# Patient Record
Sex: FEMALE | Race: WHITE | NOT HISPANIC OR LATINO | Employment: OTHER | URBAN - METROPOLITAN AREA
[De-identification: names, ages, dates, MRNs, and addresses within clinical notes are randomized per-mention and may not be internally consistent; named-entity substitution may affect disease eponyms.]

---

## 2023-05-17 ENCOUNTER — TELEPHONE (OUTPATIENT)
Dept: PRIMARY CARE | Facility: CLINIC | Age: 68
End: 2023-05-17
Payer: MEDICARE

## 2023-05-17 NOTE — TELEPHONE ENCOUNTER
Patient is asking if Dr. Fernández wants her to do blood work before her visit on May 22.   Her last blood work was in August 22.  That month she did a CBC, Lipid, TSH Cmp and Vitamin B and Vitamin D.   Please advise.

## 2023-05-18 DIAGNOSIS — R73.09 ELEVATED GLUCOSE: ICD-10-CM

## 2023-05-18 DIAGNOSIS — M85.852 OSTEOPENIA OF NECK OF LEFT FEMUR: ICD-10-CM

## 2023-05-18 DIAGNOSIS — Z78.0 MENOPAUSE: ICD-10-CM

## 2023-05-18 DIAGNOSIS — E78.00 HYPERCHOLESTEREMIA: Primary | ICD-10-CM

## 2023-05-18 DIAGNOSIS — Z86.39 HISTORY OF HYPERCALCEMIA: ICD-10-CM

## 2023-05-18 DIAGNOSIS — E53.8 B12 DEFICIENCY: ICD-10-CM

## 2023-05-18 DIAGNOSIS — Z12.31 VISIT FOR SCREENING MAMMOGRAM: ICD-10-CM

## 2023-05-18 NOTE — TELEPHONE ENCOUNTER
Notified the patient and let her know the lab work is fasting and also gave her the  number to call for mammogram and bone density

## 2023-05-19 ENCOUNTER — LAB (OUTPATIENT)
Dept: LAB | Facility: LAB | Age: 68
End: 2023-05-19
Payer: COMMERCIAL

## 2023-05-19 DIAGNOSIS — R73.09 ELEVATED GLUCOSE: ICD-10-CM

## 2023-05-19 DIAGNOSIS — M85.852 OSTEOPENIA OF NECK OF LEFT FEMUR: ICD-10-CM

## 2023-05-19 DIAGNOSIS — E78.00 HYPERCHOLESTEREMIA: ICD-10-CM

## 2023-05-19 DIAGNOSIS — E53.8 B12 DEFICIENCY: ICD-10-CM

## 2023-05-19 LAB
ALANINE AMINOTRANSFERASE (SGPT) (U/L) IN SER/PLAS: 13 U/L (ref 7–45)
ALBUMIN (G/DL) IN SER/PLAS: 4.2 G/DL (ref 3.4–5)
ALKALINE PHOSPHATASE (U/L) IN SER/PLAS: 66 U/L (ref 33–136)
ANION GAP IN SER/PLAS: 12 MMOL/L (ref 10–20)
ASPARTATE AMINOTRANSFERASE (SGOT) (U/L) IN SER/PLAS: 16 U/L (ref 9–39)
BASOPHILS (10*3/UL) IN BLOOD BY AUTOMATED COUNT: 0.02 X10E9/L (ref 0–0.1)
BASOPHILS/100 LEUKOCYTES IN BLOOD BY AUTOMATED COUNT: 0.4 % (ref 0–2)
BILIRUBIN TOTAL (MG/DL) IN SER/PLAS: 0.7 MG/DL (ref 0–1.2)
CALCIDIOL (25 OH VITAMIN D3) (NG/ML) IN SER/PLAS: 31 NG/ML
CALCIUM (MG/DL) IN SER/PLAS: 9.8 MG/DL (ref 8.6–10.6)
CARBON DIOXIDE, TOTAL (MMOL/L) IN SER/PLAS: 30 MMOL/L (ref 21–32)
CHLORIDE (MMOL/L) IN SER/PLAS: 105 MMOL/L (ref 98–107)
CHOLESTEROL (MG/DL) IN SER/PLAS: 189 MG/DL (ref 0–199)
CHOLESTEROL IN HDL (MG/DL) IN SER/PLAS: 60 MG/DL
CHOLESTEROL/HDL RATIO: 3.2
COBALAMIN (VITAMIN B12) (PG/ML) IN SER/PLAS: 403 PG/ML (ref 211–911)
CREATININE (MG/DL) IN SER/PLAS: 0.81 MG/DL (ref 0.5–1.05)
EOSINOPHILS (10*3/UL) IN BLOOD BY AUTOMATED COUNT: 0.09 X10E9/L (ref 0–0.7)
EOSINOPHILS/100 LEUKOCYTES IN BLOOD BY AUTOMATED COUNT: 2 % (ref 0–6)
ERYTHROCYTE DISTRIBUTION WIDTH (RATIO) BY AUTOMATED COUNT: 12.8 % (ref 11.5–14.5)
ERYTHROCYTE MEAN CORPUSCULAR HEMOGLOBIN CONCENTRATION (G/DL) BY AUTOMATED: 33.5 G/DL (ref 32–36)
ERYTHROCYTE MEAN CORPUSCULAR VOLUME (FL) BY AUTOMATED COUNT: 95 FL (ref 80–100)
ERYTHROCYTES (10*6/UL) IN BLOOD BY AUTOMATED COUNT: 4.54 X10E12/L (ref 4–5.2)
ESTIMATED AVERAGE GLUCOSE FOR HBA1C: 123 MG/DL
GFR FEMALE: 79 ML/MIN/1.73M2
GLUCOSE (MG/DL) IN SER/PLAS: 90 MG/DL (ref 74–99)
HEMATOCRIT (%) IN BLOOD BY AUTOMATED COUNT: 43 % (ref 36–46)
HEMOGLOBIN (G/DL) IN BLOOD: 14.4 G/DL (ref 12–16)
HEMOGLOBIN A1C/HEMOGLOBIN TOTAL IN BLOOD: 5.9 %
IMMATURE GRANULOCYTES/100 LEUKOCYTES IN BLOOD BY AUTOMATED COUNT: 0.2 % (ref 0–0.9)
LDL: 115 MG/DL (ref 0–99)
LEUKOCYTES (10*3/UL) IN BLOOD BY AUTOMATED COUNT: 4.6 X10E9/L (ref 4.4–11.3)
LYMPHOCYTES (10*3/UL) IN BLOOD BY AUTOMATED COUNT: 2.31 X10E9/L (ref 1.2–4.8)
LYMPHOCYTES/100 LEUKOCYTES IN BLOOD BY AUTOMATED COUNT: 50.2 % (ref 13–44)
MONOCYTES (10*3/UL) IN BLOOD BY AUTOMATED COUNT: 0.46 X10E9/L (ref 0.1–1)
MONOCYTES/100 LEUKOCYTES IN BLOOD BY AUTOMATED COUNT: 10 % (ref 2–10)
NEUTROPHILS (10*3/UL) IN BLOOD BY AUTOMATED COUNT: 1.71 X10E9/L (ref 1.2–7.7)
NEUTROPHILS/100 LEUKOCYTES IN BLOOD BY AUTOMATED COUNT: 37.2 % (ref 40–80)
NRBC (PER 100 WBCS) BY AUTOMATED COUNT: 0 /100 WBC (ref 0–0)
PLATELETS (10*3/UL) IN BLOOD AUTOMATED COUNT: 258 X10E9/L (ref 150–450)
POTASSIUM (MMOL/L) IN SER/PLAS: 5 MMOL/L (ref 3.5–5.3)
PROTEIN TOTAL: 6.8 G/DL (ref 6.4–8.2)
SODIUM (MMOL/L) IN SER/PLAS: 142 MMOL/L (ref 136–145)
THYROTROPIN (MIU/L) IN SER/PLAS BY DETECTION LIMIT <= 0.05 MIU/L: 1.74 MIU/L (ref 0.44–3.98)
TRIGLYCERIDE (MG/DL) IN SER/PLAS: 71 MG/DL (ref 0–149)
UREA NITROGEN (MG/DL) IN SER/PLAS: 23 MG/DL (ref 6–23)
VLDL: 14 MG/DL (ref 0–40)

## 2023-05-19 PROCEDURE — 85025 COMPLETE CBC W/AUTO DIFF WBC: CPT

## 2023-05-19 PROCEDURE — 82607 VITAMIN B-12: CPT

## 2023-05-19 PROCEDURE — 80053 COMPREHEN METABOLIC PANEL: CPT

## 2023-05-19 PROCEDURE — 36415 COLL VENOUS BLD VENIPUNCTURE: CPT

## 2023-05-19 PROCEDURE — 84443 ASSAY THYROID STIM HORMONE: CPT

## 2023-05-19 PROCEDURE — 80061 LIPID PANEL: CPT

## 2023-05-19 PROCEDURE — 82306 VITAMIN D 25 HYDROXY: CPT

## 2023-05-19 PROCEDURE — 83036 HEMOGLOBIN GLYCOSYLATED A1C: CPT

## 2023-05-21 NOTE — PROGRESS NOTES
"Subjective   Patient ID: Wendy Savage is a 68 y.o. female who presents for Nausea, Vomiting (Bouts of nausea, vomiting and diarrhea.  \"It starts with sneezing\"/Episodes 4-5 times/week./jwrn), and Diarrhea.  LAST VISIT PLAN 7/26/2022  **Patient Discussion/Summary  Risk Factors Identified During Visit: None.   Influenza: influenza vaccine was recommended.   Pneumovax 23/Prevnar 15: Pneumovax 23/Prevnar 15 vaccine was previously given.   Shingles Vaccine: Shingles vaccine was previously given.   Breast cancer screening: screening ordered and please obtain your mammogram.   Cervical cancer screening: screening is current and your pap and hpv test were normal in 2020. no pap is due at this time.   Osteoporosis screening: screening is current.   Colorectal cancer screening: screening is current and due in 2026, normal in the past and last was 2016.   HIV screening: screening not indicated.   Thank you for coming in for your annual.   MEDICATIONS:  INSTRUCTIONS:  A diet that is low in sugars, refined grains and processed foods is recommended.  Exercise at least 30 minutes per day is also recommended.  Please bring copies of your LIVING WILL AND DURABLE POWER OF  FOR HEALTH CARE to the office.  For information and access to Advanced Directives forms like your Living Will and Durable Healthcare Power-of-, go to:   [email or web address],   then click on News and Publications,   then click [facility] Publications,   then click Advanced Directives.   Or, you can type into your address bar: [email or web address]  At the bottom of the page are informative links as well as â€˜[place] Advanced Directive Forms' which contains all of the printable forms we discussed.  TESTING:please get the fasting blood tests done.and mammogram  REFERRALS:call dr kayla strange office to reschedule your january 2023 appt. as computer lists it as cancelled.  FOLLOW UP WITH DR. HIGGINS:  Next " "visit: one year annual sooner if needed  Provider Impressions  Annual history and physical completed including ROS, PE.   Medicare wellness visit completed including:  Immunizations,   Health Maintenance items,  Discussion of advanced directives and code status, which is: full code  Fall risk and prevention addressed.  Functionality and pain were assessed.   Cancer screening testing was addressed as appropriate-see patient discussion/orders.   Medications were discussed and reviewed/reconciled and refill need assessed/handled.   Patient states taking their medication regularly and appropriately.  Also:   Depression screening PhQ-2 completed: neg  Alcohol misuse screen:see ros form  In addition to the wellness visit and screening, the following medical issues were addressed:  Results of any testing completed since last visit, and  GERD asymp after 3 months of ppi and with lifestyle changes.  Vitamin B12, D deficiency-labs ordered  Vaginal atrophy as per dr whipple and urethral caruncle followed there as well  Osteopenia dexa current is exercising and taking calcium  END INFO FROM PRIOR VISIT    HPI  Sneezing and then nausea vomiting first episode,  and then diarrhea.when got home.  That was after bk chick sandwich and not sure if she ate trail mix on the way home.  2 other times trail mix eating and nausea, diarrhea then vomiting.  Last time was after pf vasques and had a wrap that had nuts.  Lasts 15 minutes then done.  First time worse. And even woke her up in the night.'she still has her gall bladder.  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    No cp sob palpitations.she is keeping busy in her \"group home\" and is exercising and no issues.  Scribe Transcription:  She states she was recently driving home from [place] and had to pull over because she was nauseous. 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 She states it happened again in late March. She " states it is always a while after she eats which starts with sneezing, then diarrhea, and then vomiting. She reports eating trail mix while heading back from [place]. Her trail mix contains cashews, almonds, chocolate.    She states her bp has been low sometimes which causes dizziness and lightheadedness. She an echo in 2021 which showed normal LV systolic function. She does have mild impaired diastolic filling.     She is due for a colonoscopy in 2026. She has normal bowel movements other than her food allergy episodes. She also reports drinking kombucha and probiotics.     Her CAC score was 0 in 2019. She had a stress test in August 2021 and a resting EKG with low voltage QRS and non-specific changes.     Scribe Attestation  By signing my name below, I, Anderson Potter, [place]   attest that this documentation has been prepared under the direction and in the presence of Maxine Fernández MD.   Review of Systems  See hpi  Objective   Lab Results   Component Value Date    WBC 4.6 05/19/2023    HGB 14.4 05/19/2023    HCT 43.0 05/19/2023     05/19/2023    CHOL 189 05/19/2023    TRIG 71 05/19/2023    HDL 60.0 05/19/2023    ALT 13 05/19/2023    AST 16 05/19/2023     05/19/2023    K 5.0 05/19/2023     05/19/2023    CREATININE 0.81 05/19/2023    BUN 23 05/19/2023    CO2 30 05/19/2023    TSH 1.74 05/19/2023    HGBA1C 5.9 (A) 05/19/2023     2018 ct cac score zero  Stress test 2021:  Baseline ECG: Resting ECG showed normal sinus rhythm with low voltage QRS and nonspecific ST-T wave changes.     Stress ECG: Stress ECG showed sinus tachycardia, with one isolated PAC. No ST changes.  Summary:   1. Negative stress EKG for ischemia.   2. Excellent functional capacity for age.   3. No exercise associated cardiac symptoms were noted.   4. Appropriate blood pressure response with exercise.    Todays ecg is same. As 2019.     Latest Reference Range & Units Most Recent   GLUCOSE 74 - 99 mg/dL 90  5/19/23 08:22   SODIUM 136  - 145 mmol/L 142  5/19/23 08:22   POTASSIUM 3.5 - 5.3 mmol/L 5.0  5/19/23 08:22   CHLORIDE 98 - 107 mmol/L 105  5/19/23 08:22   Bicarbonate 21 - 32 mmol/L 30  5/19/23 08:22   Anion Gap 10 - 20 mmol/L 12  5/19/23 08:22   Blood Urea Nitrogen 6 - 23 mg/dL 23  5/19/23 08:22   Creatinine 0.50 - 1.05 mg/dL 0.81  5/19/23 08:22   Calcium 8.6 - 10.6 mg/dL 9.8  5/19/23 08:22   Albumin 3.4 - 5.0 g/dL 4.2  5/19/23 08:22   Alkaline Phosphatase 33 - 136 U/L 66  5/19/23 08:22   ALT 7 - 45 U/L 13  5/19/23 08:22   AST 9 - 39 U/L 16  5/19/23 08:22   Bilirubin Total 0.0 - 1.2 mg/dL 0.7  5/19/23 08:22   Bilirubin, Direct 0.0 - 0.3 mg/dL 0.1  10/3/17 11:19   HDL CHOLESTEROL mg/dL 60.0  5/19/23 08:22   Cholesterol/HDL Ratio  3.2  5/19/23 08:22   VLDL 0 - 40 mg/dL 14  5/19/23 08:22   TRIGLYCERIDES 0 - 149 mg/dL 71  5/19/23 08:22   Total Protein 6.4 - 8.2 g/dL 6.8  5/19/23 08:22   MAGNESIUM 1.60 - 2.40 mg/dL 2.18  11/21/19 08:29   CHOLESTEROL 0 - 199 mg/dL 189  5/19/23 08:22   LDL 0 - 99 mg/dL 115 (H)  5/19/23 08:22   Vitamin B12 211 - 911 pg/mL 403  5/19/23 08:22   Hemoglobin A1C % 5.9 !  5/19/23 08:22   Thyroxine, Free 0.78 - 1.48 ng/dL 1.16  11/21/19 08:29   Thyroid Stimulating Hormone 0.44 - 3.98 mIU/L 1.74  5/19/23 08:22   Vitamin D, 25-Hydroxy, Total ng/mL 31  5/19/23 08:22   Estimated Average Glucose MG/  5/19/23 08:22   WBC 4.4 - 11.3 x10E9/L 4.6  5/19/23 08:22   RBC 4.00 - 5.20 x10E12/L 4.54  5/19/23 08:22   HEMOGLOBIN 12.0 - 16.0 g/dL 14.4  5/19/23 08:22   HEMATOCRIT 36.0 - 46.0 % 43.0  5/19/23 08:22   MCV 80 - 100 fL 95  5/19/23 08:22   MCHC 32.0 - 36.0 g/dL 33.5  5/19/23 08:22   Platelets 150 - 450 x10E9/L 258  5/19/23 08:22   Neutrophils % 40.0 - 80.0 % 37.2  5/19/23 08:22   Immature Granulocytes %, Automated 0.0 - 0.9 % 0.2  5/19/23 08:22   Lymphocytes % 13.0 - 44.0 % 50.2  5/19/23 08:22   Monocytes % 2.0 - 10.0 % 10.0  5/19/23 08:22   Eosinophils % 0.0 - 6.0 % 2.0  5/19/23 08:22   Basophils % 0.0 - 2.0 % 0.4  5/19/23  08:22   Neutrophils Absolute 1.20 - 7.70 x10E9/L 1.71  5/19/23 08:22   Lymphocytes Absolute 1.20 - 4.80 x10E9/L 2.31  5/19/23 08:22   Monocytes Absolute 0.10 - 1.00 x10E9/L 0.46  5/19/23 08:22   Eosinophils Absolute 0.00 - 0.70 x10E9/L 0.09  5/19/23 08:22   Basophils Absolute 0.00 - 0.10 x10E9/L 0.02  5/19/23 08:22   nRBC 0.0 - 0.0 /100 WBC 0.0  5/19/23 08:22   RED CELL DISTRIBUTION WIDTH 11.5 - 14.5 % 12.8  5/19/23 08:22   URINE CULTURE  Rpt  7/22/21 02:46   Sars-Cov-2 Nucleocapsid IgG Antibody Negative  Negative  8/4/22 08:19   Color, Urine STRAW,YELLOW  YELLOW  7/21/21 17:16   Appearance, Urine CLEAR  CLEAR  7/21/21 17:16   Specific Gravity, Urine 1.005 - 1.035  1.011  7/21/21 17:16   pH, Urine 5.0 - 8.0  7.0  7/21/21 17:16   Protein, Urine NEGATIVE mg/dL NEGATIVE  7/21/21 17:16   Glucose, Urine NEGATIVE mg/dL NEGATIVE  7/21/21 17:16   Blood, Urine NEGATIVE  NEGATIVE  7/21/21 17:16   Ketones, Urine NEGATIVE mg/dL NEGATIVE  7/21/21 17:16   Bilirubin, Urine NEGATIVE  NEGATIVE  7/21/21 17:16   Urobilinogen, Urine 0.0 - 1.9 mg/dL <2.0  7/21/21 17:16   Nitrite, Urine NEGATIVE  NEGATIVE  7/21/21 17:16   Leukocyte Esterase, Urine NEGATIVE  NEGATIVE  7/21/21 17:16   CT CARDIAC SCORING WO IV CONTRAST  Rpt  6/20/19 09:38   XR CERVICAL SPINE COMPLETE 4-5 VIEWS  Rpt  4/26/17 09:19   XR LUMBAR SPINE 2-3 VIEWS  Rpt  7/9/20 12:46   BI MAMMO BILATERAL SCREENING  Rpt  11/7/12 17:32   US ABDOMEN LIMITED LIVER  Rpt  6/24/20 09:10   DEXA BONE DENSITY  Rpt  5/18/23 11:03   BI MAMMO BILATERAL SCREENING TOMOSYNTHESIS  Rpt  5/18/23 11:03   BONE DENSITY  Rpt  8/4/21 09:00   CARDIAC STRESS TEST  Rpt  8/18/21 00:00   DIGITAL MAMM SCREENING  Rpt  7/9/21 10:00   ECHOCARDIOGRAM  Rpt  7/29/21 00:00   ELECTROCARDIOGRAM 12 LEAD  Rpt  7/29/21 07:49   ELECTROCARDIOGRAM RHYTHM STRIP  Rpt  11/18/19 00:00   FINGERS & HAND  Rpt  4/26/17 09:20   GFR Female >90 mL/min/1.73m2 79  5/19/23 08:22   GLOMERULAR FILTRATION RATE- >60  "mL/min/1.73m2 >60  7/9/20 12:27   GLOMERULAR FILTRATION RATE-NON  >60 mL/min/1.73m2 >60  7/9/20 12:27   HIP W PELVIS  Rpt  7/9/20 12:46   P Axis degrees 50  7/29/21 07:49   VA Interval ms 126  7/29/21 07:49   QT Interval ms 438  7/29/21 07:49   SARS-COV-2 PCR, SCREEN ASYMPTOMATIC  Rpt  8/4/20 09:33   SARS-CoV-2 Result Not Detected  NOT DETECTED  8/4/20 09:33   XR KNEE COMPLETE 4 OR MORE VIEWS  Rpt  3/31/14 11:35   (H): Data is abnormally high  !: Data is abnormal  Rpt: View report in Results Review for more information    Physical ExamBP 100/70   Pulse 69   Resp 20   Ht 1.562 m (5' 1.5\")   Wt 62.1 kg (136 lb 12.8 oz)   BMI 25.43 kg/m²       10/12/2021    11:51 AM 1/20/2022     9:05 AM 4/7/2022     4:34 PM 7/26/2022    10:17 AM 10/18/2022    11:27 AM 3/9/2023     8:40 AM 5/22/2023     3:03 PM   Vitals   Systolic 103 125  126 118 112 100   Diastolic 64 78  69 69 70 70   Heart Rate 60 71 82 55 67 62 69   Temp   37.4 °C (99.3 °F)  36.7 °C (98.1 °F)     Resp  18   14  20   Height (in)    1.549 m (5' 1\")  1.549 m (5' 1\") 1.562 m (5' 1.5\")   Weight (lb) 144.9 143 142 138.19 138 142 136.8   BMI 26.94 kg/m2 26.58 kg/m2 26.4 kg/m2 26.11 kg/m2 26.07 kg/m2 26.83 kg/m2 25.43 kg/m2   BSA (m2) 1.69 m2 1.68 m2 1.67 m2 1.64 m2 1.64 m2 1.66 m2 1.64 m2   Visit Report       Report       Home bp 90/60  Working out   She is getting dizzy when working out  Had nausea at [employer]  Patient looks well and is in no obvious distress.  HEENT:   Normocephalic, no facial asymmetry  Eyes: Sclera and conjunctiva are clear.  Neck: No adenopathy cervical (Ant/post/lat), no Supraclavicular nodes.   Thyroid normal.   Carotid pulses normal, no carotid bruits.  Lungs : RR normal. Clear to auscultation anterior, posterior and lateral. No rales, wheezes rhonchi or rubs. Good air exchange.  Heart: RRR. No Murmur, gallop, click or rub.  Abdomen: Bowel sounds normal, No bruits. No pulsatile mass. No " hepatosplenomegaly, masses or tenderness. Soft, no guarding.  Vascular:  Posterior tibialis and dorsalis pedis pulses within normal limits bilaterally.   Extremities: no upper extremity edema. No lower extremity edema.   Musculoskeletal: no synovitis of joints seen. No new deformity.  Neuro: CN 2-12 intact. Alert, appropriate.   Ambulates independently.  No gross motor deficit.   No tremors.  Psych: normal mood and affect.  Skin: No rash, bruising petechiae or jaundice.    Assessment/Plan   Problem List Items Addressed This Visit    None  Visit Diagnoses       Elevated hemoglobin A1c    -  Primary    Nausea and vomiting in adult        Relevant Orders    Cashew Nut Component RAna o 3    Macadamia nut IgE    Peanut IgE    [place] IgE    US gallbladder (Completed)    Acute diarrhea        Relevant Orders    Cashew Nut Component RAna o 3    Macadamia nut IgE    Peanut IgE    [place] IgE    Allergy, unspecified, initial encounter        Relevant Orders    Peanut IgE    [place] IgE    IgE    Other specified hypotension        Relevant Orders    Cortisol    ACTH    Transthoracic echo (TTE) complete    ECG 12 lead    Aortic valve insufficiency, acquired        Relevant Orders    Transthoracic echo (TTE) complete          Will ck for biliary origin, gb us, consider hida scan if that is normal.  Will ck for allergies as well.  For a1, schedule echo.  Doubt realted to addisons, but will ck acth and cortisol. Not able to schedule cortrosyn stim test. She is not losing weight.

## 2023-05-22 ENCOUNTER — OFFICE VISIT (OUTPATIENT)
Dept: PRIMARY CARE | Facility: CLINIC | Age: 68
End: 2023-05-22
Payer: MEDICARE

## 2023-05-22 VITALS
SYSTOLIC BLOOD PRESSURE: 100 MMHG | WEIGHT: 136.8 LBS | HEIGHT: 62 IN | HEART RATE: 69 BPM | RESPIRATION RATE: 20 BRPM | BODY MASS INDEX: 25.17 KG/M2 | DIASTOLIC BLOOD PRESSURE: 70 MMHG

## 2023-05-22 DIAGNOSIS — R11.2 NAUSEA AND VOMITING IN ADULT: ICD-10-CM

## 2023-05-22 DIAGNOSIS — I35.1 AORTIC VALVE INSUFFICIENCY, ACQUIRED: ICD-10-CM

## 2023-05-22 DIAGNOSIS — I95.89 OTHER SPECIFIED HYPOTENSION: ICD-10-CM

## 2023-05-22 DIAGNOSIS — R19.7 ACUTE DIARRHEA: ICD-10-CM

## 2023-05-22 DIAGNOSIS — T78.40XA ALLERGY, UNSPECIFIED, INITIAL ENCOUNTER: ICD-10-CM

## 2023-05-22 DIAGNOSIS — R73.09 ELEVATED HEMOGLOBIN A1C: Primary | ICD-10-CM

## 2023-05-22 PROCEDURE — 99215 OFFICE O/P EST HI 40 MIN: CPT | Performed by: INTERNAL MEDICINE

## 2023-05-22 PROCEDURE — 93000 ELECTROCARDIOGRAM COMPLETE: CPT | Performed by: INTERNAL MEDICINE

## 2023-05-22 PROCEDURE — 1159F MED LIST DOCD IN RCRD: CPT | Performed by: INTERNAL MEDICINE

## 2023-05-22 PROCEDURE — 1036F TOBACCO NON-USER: CPT | Performed by: INTERNAL MEDICINE

## 2023-05-22 RX ORDER — OMEPRAZOLE 20 MG/1
CAPSULE, DELAYED RELEASE ORAL
COMMUNITY
End: 2024-01-23 | Stop reason: SDUPTHER

## 2023-05-22 RX ORDER — CHOLECALCIFEROL (VITAMIN D3) 125 MCG
CAPSULE ORAL
COMMUNITY
Start: 2017-04-19

## 2023-05-22 RX ORDER — ESTRADIOL 0.1 MG/G
CREAM VAGINAL
COMMUNITY
Start: 2020-03-26

## 2023-05-22 RX ORDER — IBUPROFEN 600 MG/1
TABLET ORAL
COMMUNITY
Start: 2017-10-03 | End: 2024-01-23 | Stop reason: SDUPTHER

## 2023-05-22 RX ORDER — GLUCOSAM/CHONDRO/HERB 149/HYAL 750-100 MG
TABLET ORAL
COMMUNITY

## 2023-05-22 RX ORDER — CALCIPOTRIENE 50 UG/G
CREAM TOPICAL 2 TIMES DAILY
COMMUNITY
Start: 2017-06-28 | End: 2024-05-03 | Stop reason: WASHOUT

## 2023-05-22 RX ORDER — LANOLIN ALCOHOL/MO/W.PET/CERES
1 CREAM (GRAM) TOPICAL DAILY
COMMUNITY

## 2023-05-22 ASSESSMENT — LIFESTYLE VARIABLES
SKIP TO QUESTIONS 9-10: 1
HOW OFTEN DURING THE LAST YEAR HAVE YOU FOUND THAT YOU WERE NOT ABLE TO STOP DRINKING ONCE YOU HAD STARTED: NEVER
HOW OFTEN DO YOU HAVE A DRINK CONTAINING ALCOHOL: 2-3 TIMES A WEEK
AUDIT-C TOTAL SCORE: 3
HAS A RELATIVE, FRIEND, DOCTOR, OR ANOTHER HEALTH PROFESSIONAL EXPRESSED CONCERN ABOUT YOUR DRINKING OR SUGGESTED YOU CUT DOWN: NO
HOW OFTEN DO YOU HAVE SIX OR MORE DRINKS ON ONE OCCASION: NEVER
HOW OFTEN DURING THE LAST YEAR HAVE YOU NEEDED AN ALCOHOLIC DRINK FIRST THING IN THE MORNING TO GET YOURSELF GOING AFTER A NIGHT OF HEAVY DRINKING: NEVER
HOW OFTEN DURING THE LAST YEAR HAVE YOU HAD A FEELING OF GUILT OR REMORSE AFTER DRINKING: NEVER
HOW OFTEN DURING THE LAST YEAR HAVE YOU BEEN UNABLE TO REMEMBER WHAT HAPPENED THE NIGHT BEFORE BECAUSE YOU HAD BEEN DRINKING: NEVER
HAVE YOU OR SOMEONE ELSE BEEN INJURED AS A RESULT OF YOUR DRINKING: NO
HOW OFTEN DURING THE LAST YEAR HAVE YOU FAILED TO DO WHAT WAS NORMALLY EXPECTED FROM YOU BECAUSE OF DRINKING: NEVER
HOW MANY STANDARD DRINKS CONTAINING ALCOHOL DO YOU HAVE ON A TYPICAL DAY: 1 OR 2
AUDIT TOTAL SCORE: 3

## 2023-05-22 ASSESSMENT — PATIENT HEALTH QUESTIONNAIRE - PHQ9
1. LITTLE INTEREST OR PLEASURE IN DOING THINGS: NOT AT ALL
2. FEELING DOWN, DEPRESSED OR HOPELESS: NOT AT ALL
SUM OF ALL RESPONSES TO PHQ9 QUESTIONS 1 AND 2: 0

## 2023-05-22 ASSESSMENT — PAIN SCALES - GENERAL: PAINLEVEL: 0-NO PAIN

## 2023-05-22 ASSESSMENT — COLUMBIA-SUICIDE SEVERITY RATING SCALE - C-SSRS
2. HAVE YOU ACTUALLY HAD ANY THOUGHTS OF KILLING YOURSELF?: NO
1. IN THE PAST MONTH, HAVE YOU WISHED YOU WERE DEAD OR WISHED YOU COULD GO TO SLEEP AND NOT WAKE UP?: NO
6. HAVE YOU EVER DONE ANYTHING, STARTED TO DO ANYTHING, OR PREPARED TO DO ANYTHING TO END YOUR LIFE?: NO

## 2023-05-22 ASSESSMENT — ENCOUNTER SYMPTOMS
LOSS OF SENSATION IN FEET: 0
OCCASIONAL FEELINGS OF UNSTEADINESS: 0
DEPRESSION: 0

## 2023-05-22 NOTE — PATIENT INSTRUCTIONS
Obtain a non fasting cortisol level at 8 am. Can do allergen blood tests then as well.    Schedule ultrasound of gallbladder.    Schedule echocardiogram to check on aortic valve and lower blood pressure.  Do not restrict salt from your diet, your blood pressure can be a little higher.    Follow up for your annual wellness -keep appt.

## 2023-05-27 DIAGNOSIS — R11.2 NAUSEA AND VOMITING IN ADULT: Primary | ICD-10-CM

## 2023-05-27 DIAGNOSIS — K82.4 GALLBLADDER POLYP: ICD-10-CM

## 2023-05-27 DIAGNOSIS — K76.89 SIMPLE HEPATIC CYST: ICD-10-CM

## 2023-05-30 ENCOUNTER — LAB (OUTPATIENT)
Dept: LAB | Facility: LAB | Age: 68
End: 2023-05-30
Payer: MEDICARE

## 2023-05-30 ENCOUNTER — TELEPHONE (OUTPATIENT)
Dept: PRIMARY CARE | Facility: CLINIC | Age: 68
End: 2023-05-30

## 2023-05-30 DIAGNOSIS — T78.40XA ALLERGY, UNSPECIFIED, INITIAL ENCOUNTER: ICD-10-CM

## 2023-05-30 DIAGNOSIS — I95.89 OTHER SPECIFIED HYPOTENSION: ICD-10-CM

## 2023-05-30 DIAGNOSIS — R19.7 ACUTE DIARRHEA: ICD-10-CM

## 2023-05-30 DIAGNOSIS — R11.2 NAUSEA AND VOMITING IN ADULT: ICD-10-CM

## 2023-05-30 LAB
CORTISOL (UG/DL) IN SERUM: 19.9 UG/DL (ref 2.5–20)
IGE (IU/L) IN SERUM OR PLASMA: 13 IU/ML (ref 0–214)

## 2023-05-30 PROCEDURE — 82024 ASSAY OF ACTH: CPT

## 2023-05-30 PROCEDURE — 82785 ASSAY OF IGE: CPT

## 2023-05-30 PROCEDURE — 36415 COLL VENOUS BLD VENIPUNCTURE: CPT

## 2023-05-30 PROCEDURE — 82533 TOTAL CORTISOL: CPT

## 2023-05-30 PROCEDURE — 86003 ALLG SPEC IGE CRUDE XTRC EA: CPT

## 2023-05-30 PROCEDURE — 86008 ALLG SPEC IGE RECOMB EA: CPT

## 2023-05-30 NOTE — TELEPHONE ENCOUNTER
Patient notified and read message.  BN    ----- Message from Maxine Fernández MD sent at 5/27/2023  5:38 PM EDT -----  Please call the patient regarding her result.  Gallbladder ultrasound did not show gallstones. There is a tiny 2mm polyp that the radiologist said did not need any follow up  There are a couple of liver cysts, small, not worrisome.  Recommend she get a  gallbladder function test, nuclear test -schedule at radiology but no t our building.

## 2023-06-01 ENCOUNTER — TELEPHONE (OUTPATIENT)
Dept: PRIMARY CARE | Facility: CLINIC | Age: 68
End: 2023-06-01
Payer: MEDICARE

## 2023-06-01 NOTE — TELEPHONE ENCOUNTER
----- Message from Maxine Fernández MD sent at 6/1/2023  9:03 AM EDT -----  Please call the patient regarding her result.  IgE level and cortisol level are normal. Hida scan test results from 5-31 are not yet resulted.    Pt notified. LMOM

## 2023-06-01 NOTE — RESULT ENCOUNTER NOTE
Please call the patient regarding her result.  IgE level and cortisol level are normal. Hida scan test results from 5-31 are not yet resulted.

## 2023-06-02 LAB
ADRENOCORTICOTROPIC HORMONE: 19.3 PG/ML (ref 7.2–63.3)
ALLERGEN FOOD: ALMOND (AMYGDALUS COMMUNIS) IGE (KU/L): <0.1 KU/L
ALLERGEN FOOD: MACADAMIA NUT (MACADAMIA SPP.) IGE (KU/L): <0.1 KU/L
ALLERGEN FOOD: PEANUT (ARACHIS HYPOGAEA) IGE (KU/L): <0.1 KU/L
CASHEW COMP. RA O3, VIRC: <0.1 KU/L
CLASS CASHEW RA O3 , VIRC: 0
IMMUNOCAP INTERPRETATION (ARUP): NORMAL

## 2023-07-21 NOTE — PROGRESS NOTES
Subjective   Reason for Visit: Wendy Savage is an 68 y.o. female here for a Medicare Wellness visit. And annual physical exam, review of tets.  LAST VISIT: 5/22/2023  PATIENT'S DISCUSSION/SUMMARY:  Obtain a non fasting cortisol level at 8 am. Can do allergen blood tests then as well.  Schedule ultrasound of gallbladder.  Schedule echocardiogram to check on aortic valve and lower blood pressure.  Do not restrict salt from your diet, your blood pressure can be a little higher.  Follow up for your annual wellness -keep appt.  PROVIDER'S IMPRESSIONS:  Elevated hemoglobin A1c  Nausea and vomiting in adult  Acute diarrhea  Allergy, unspecified, initial encounter  Other specified hypotension  Aortic valve insufficiency, acquired  Orders Placed  ACTH  [place] IgE  Cashew Nut Component RAna o 3  Cortisol  IgE  Macadamia nut IgE  Peanut IgE   all allergens checked were negative  US gallbladder--negative  Transthoracic echo (TTE) complete  stable echo  ECG 12 lead  Encounters with Related Results  Lab (5/30/2023)  END OF LAST VISIT        HPI  Here for w visit and annual pe and lab review.  Tests done since last visit normal or stable. Echo mild ai, gb assessment neg including hida scan.  A1c 5.9 may,not needed now. Cortisol levels ok.  Allergen foods tested neg.      Health maintenance items last completed:  Colonoscopy: 12/29/2016 NORMAL COLONIC MUCOSA. SMALL INTERNAL HEMORRHOIDS. CONTINUE METAMUCIL DAILY.   Mammogram: 8/04/2022 Category: 1 - Negative. Recommendation: 1 Year Screening.   Bone Density: 08/04/2021 Osteopenia   Pap: 2020 r pap and hpv test were normal in 2020. no pap is due at this time. In 2023   Pelvic:dr Campbell June 2023  Breast exam in office:today  Vaccines due or not completed: utd reviewed.  Last Health Maintenance exam: 7/06/2022    Patient Care Team:  Maxine Fernández MD as PCP - General  Maxine Fernández MD as PCP - [employer] PCP     [facility] updated.  Review of Systems  All systems  reviewed and are negative unless otherwise stated in HPI or noted in writing on the written   7 page history and review of systems document reviewed by me and  scanned in with this visit.    All negative other than trigger finger middle finger right and arthritis.  No further diarrhea n/v episodes.  Objective    Latest Reference Range & Units 05/30/23 08:25   CORTISOL 2.5 - 20.0 ug/dL 19.9   Adrenocorticotropic Hormone (ACTH) 7.2 - 63.3 pg/mL 19.3   Rena L[place] IgE <0.35 KU/L <0.10   Cashew Comp. rA o3 <0.10 kU/L <0.10   Class Cashew rA o3  0   Macadamia Nut IgE <=0.34 kU/L <0.10   Peanut IgE <0.35 KU/L <0.10   Immunocap Interpretation  See Note   IgE 0 - 214 IU/mL 13      Latest Reference Range & Units 05/19/23 08:22   GLUCOSE 74 - 99 mg/dL 90   SODIUM 136 - 145 mmol/L 142   POTASSIUM 3.5 - 5.3 mmol/L 5.0   CHLORIDE 98 - 107 mmol/L 105   Bicarbonate 21 - 32 mmol/L 30   Anion Gap 10 - 20 mmol/L 12   Blood Urea Nitrogen 6 - 23 mg/dL 23   Creatinine 0.50 - 1.05 mg/dL 0.81   Calcium 8.6 - 10.6 mg/dL 9.8   Albumin 3.4 - 5.0 g/dL 4.2   Alkaline Phosphatase 33 - 136 U/L 66   ALT 7 - 45 U/L 13   AST 9 - 39 U/L 16   Bilirubin Total 0.0 - 1.2 mg/dL 0.7   HDL CHOLESTEROL mg/dL 60.0   Cholesterol/HDL Ratio  3.2   VLDL 0 - 40 mg/dL 14   TRIGLYCERIDES 0 - 149 mg/dL 71   Total Protein 6.4 - 8.2 g/dL 6.8   CHOLESTEROL 0 - 199 mg/dL 189   LDL 0 - 99 mg/dL 115 (H)   Vitamin B12 211 - 911 pg/mL 403   Hemoglobin A1C % 5.9 !   Thyroid Stimulating Hormone 0.44 - 3.98 mIU/L 1.74   Vitamin D, 25-Hydroxy, Total ng/mL 31   Estimated Average Glucose MG/   WBC 4.4 - 11.3 x10E9/L 4.6   RBC 4.00 - 5.20 x10E12/L 4.54   HEMOGLOBIN 12.0 - 16.0 g/dL 14.4   HEMATOCRIT 36.0 - 46.0 % 43.0   MCV 80 - 100 fL 95   MCHC 32.0 - 36.0 g/dL 33.5   Platelets 150 - 450 x10E9/L 258   Neutrophils % 40.0 - 80.0 % 37.2   Immature Granulocytes %, Automated 0.0 - 0.9 % 0.2   Lymphocytes % 13.0 - 44.0 % 50.2   Monocytes % 2.0 - 10.0 % 10.0   Eosinophils % 0.0 - 6.0 %  "2.0   Basophils % 0.0 - 2.0 % 0.4   Neutrophils Absolute 1.20 - 7.70 x10E9/L 1.71   Lymphocytes Absolute 1.20 - 4.80 x10E9/L 2.31   Monocytes Absolute 0.10 - 1.00 x10E9/L 0.46   Eosinophils Absolute 0.00 - 0.70 x10E9/L 0.09   Basophils Absolute 0.00 - 0.10 x10E9/L 0.02   nRBC 0.0 - 0.0 /100 WBC 0.0   RED CELL DISTRIBUTION WIDTH 11.5 - 14.5 % 12.8   GFR Female >90 mL/min/1.73m2 79     PGM diabetes on insulin   === 05/22/23 ===    US GALLBLADDER    - Impression -  The right hepatic lobe is enlarged; no acute pathology is otherwise  demonstrated. Has liver cysts.    Echo June 2023 mild AI otherwise ok.  RSVP normal    NM gb scan normal  Vitals:  BP 97/72   Pulse 66   Resp 18   Ht 1.562 m (5' 1.5\")   Wt 63.5 kg (140 lb)   BMI 26.02 kg/m²       Physical Exam  General: Patient is known to me, looks well, is in usual state of health, with  no obvious distress.  Head: normocephalic  Eyes: PERRL, EOMI, no scleral icterus or conjunctival abnormality  Ears:Normal canals and TM's  Oropharynx: Well hydrated mucosa. no lesions, erythema. palate moves well.  Neck: No masses, no cervical (A/P/ or Lateral) adenopathy. Thyroid not enlarged and no nodules. Carotid pulses normal and no bruits.  Chest: Normal AP diameter. No supraclavicular adenopathy.  Lungs: Clear to auscultation: no rales , wheezes, rhonchi, rubs, examined bilaterally, ant/post /lateral. RR normal.  Heart: RRR. No MGCR S1 S2 normal.  Abd: BS normal, no bruits. No hepatosplenomegaly. No abdominal mass or tenderness. No distension.  Extremities: No upper extremity edema. No lower leg edema.No ischemia.   Joints: no synovitis seen. No deformities.  Pulses: normal posterior tibialis pulses, normal dorsalis pedis pulses. normal radial pulses. Normal femoral pulses without bruits.  Neuro: CN 2-12 intact . Alert, oriented, appropriate.  No focal weakness observed. No tremors. Ambulation is normal .  Psych: Mood and affect normal. No cognitive deficits noted during this " exam. Alert, oriented, appropriate.  Skin: No rash, petechiae or excessive bruising.  Lymph: no SC axillary or inguinal adenopathy   Breast Exam : Symmetric appearance. No masses, nipple discharge, skin retraction, axillary or supraclavicular adenopathy.    Dupuytren's contracture right 3/4 finger right.    Pelvic exam done by urogyn dr whipple June 2023, pap is not due.    Assessment/Plan   Problem List Items Addressed This Visit    None  Visit Diagnoses       Encounter for subsequent annual wellness visit (AWV) in Medicare patient    -  Primary    Relevant Orders    Visual acuity screening    Annual visit for general adult medical examination with abnormal findings        Aortic valve insufficiency, acquired        Elevated hemoglobin A1c        Relevant Orders    Hemoglobin A1c    Allergy, unspecified, subsequent encounter        Osteopenia of neck of left femur        Relevant Orders    XR DEXA bone density    Menopause        Relevant Orders    XR DEXA bone density    Trigger finger, right middle finger        Need for hepatitis C screening test        Relevant Orders    Hepatitis C antibody         Annual  history and physical completed including  ROS, PE.   Medicare wellness visit  completed including:  Immunizations,   Health Maintenance items,  Discussion of advanced directives and code status, which is: full code  Fall risk and prevention addressed.  Functionality and pain were assessed.   Cancer screening testing was addressed as appropriate-see patient discussion/orders.   Medications were discussed and reviewed/reconciled and refill need assessed/handled.   Patient states taking their medication regularly and appropriately.  Also:   Depression screening PhQ-2 completed: neg  Alcohol misuse screen: neg    In addition to the wellness visit and screening, the above medical issues were addressed:  As well as results of testing completed since last visit.   Pt instructions:  Thank you for coming in for your  wellness visit.    Follow up in one year--call ahead to have lab orders placed prior to that visit.  Blood test in November to check A1c and hepatitis c screening.    If you have another episode that might be a food allergy will allergy refer.  Dermatologist for psoriasis as planned.    See your hand surgeon for trigger finger if it keeps bothering you. Try massage every night with a cream that has vitamin E in it.    Please do your health care power of .    Mammogram and bone density as planned.

## 2023-07-26 ENCOUNTER — APPOINTMENT (OUTPATIENT)
Dept: PRIMARY CARE | Facility: CLINIC | Age: 68
End: 2023-07-26
Payer: MEDICARE

## 2023-07-31 ENCOUNTER — OFFICE VISIT (OUTPATIENT)
Dept: PRIMARY CARE | Facility: CLINIC | Age: 68
End: 2023-07-31
Payer: MEDICARE

## 2023-07-31 VITALS
HEART RATE: 66 BPM | DIASTOLIC BLOOD PRESSURE: 72 MMHG | SYSTOLIC BLOOD PRESSURE: 97 MMHG | BODY MASS INDEX: 25.76 KG/M2 | WEIGHT: 140 LBS | RESPIRATION RATE: 18 BRPM | HEIGHT: 62 IN

## 2023-07-31 DIAGNOSIS — I35.1 AORTIC VALVE INSUFFICIENCY, ACQUIRED: ICD-10-CM

## 2023-07-31 DIAGNOSIS — Z00.01 ANNUAL VISIT FOR GENERAL ADULT MEDICAL EXAMINATION WITH ABNORMAL FINDINGS: ICD-10-CM

## 2023-07-31 DIAGNOSIS — Z00.00 ENCOUNTER FOR SUBSEQUENT ANNUAL WELLNESS VISIT (AWV) IN MEDICARE PATIENT: Primary | ICD-10-CM

## 2023-07-31 DIAGNOSIS — M85.852 OSTEOPENIA OF NECK OF LEFT FEMUR: ICD-10-CM

## 2023-07-31 DIAGNOSIS — R73.09 ELEVATED HEMOGLOBIN A1C: ICD-10-CM

## 2023-07-31 DIAGNOSIS — Z11.59 NEED FOR HEPATITIS C SCREENING TEST: ICD-10-CM

## 2023-07-31 DIAGNOSIS — T78.40XD ALLERGY, UNSPECIFIED, SUBSEQUENT ENCOUNTER: ICD-10-CM

## 2023-07-31 DIAGNOSIS — Z13.89 ENCOUNTER FOR SCREENING FOR OTHER DISORDER: ICD-10-CM

## 2023-07-31 DIAGNOSIS — M65.331 TRIGGER FINGER, RIGHT MIDDLE FINGER: ICD-10-CM

## 2023-07-31 DIAGNOSIS — Z78.9 FULL CODE STATUS: ICD-10-CM

## 2023-07-31 DIAGNOSIS — Z78.0 MENOPAUSE: ICD-10-CM

## 2023-07-31 PROBLEM — E78.00 ELEVATED LDL CHOLESTEROL LEVEL: Status: ACTIVE | Noted: 2023-07-31

## 2023-07-31 PROBLEM — D69.6 THROMBOCYTOPENIA (CMS-HCC): Status: RESOLVED | Noted: 2023-07-31 | Resolved: 2023-07-31

## 2023-07-31 PROBLEM — M62.838 SPASM OF CERVICAL PARASPINOUS MUSCLE: Status: RESOLVED | Noted: 2023-07-31 | Resolved: 2023-07-31

## 2023-07-31 PROBLEM — M25.532 PAIN IN BOTH WRISTS: Status: RESOLVED | Noted: 2023-07-31 | Resolved: 2023-07-31

## 2023-07-31 PROBLEM — J01.00 ACUTE MAXILLARY SINUSITIS: Status: RESOLVED | Noted: 2023-07-31 | Resolved: 2023-07-31

## 2023-07-31 PROBLEM — R05.8 PRODUCTIVE COUGH: Status: RESOLVED | Noted: 2023-07-31 | Resolved: 2023-07-31

## 2023-07-31 PROBLEM — G56.03 CARPAL TUNNEL SYNDROME, BILATERAL UPPER LIMBS: Status: RESOLVED | Noted: 2023-07-31 | Resolved: 2023-07-31

## 2023-07-31 PROBLEM — R00.2 PALPITATIONS: Status: RESOLVED | Noted: 2023-07-31 | Resolved: 2023-07-31

## 2023-07-31 PROBLEM — K44.9 HIATAL HERNIA: Status: ACTIVE | Noted: 2023-07-31

## 2023-07-31 PROBLEM — R20.2 RIGHT HAND PARESTHESIA: Status: RESOLVED | Noted: 2023-07-31 | Resolved: 2023-07-31

## 2023-07-31 PROBLEM — R51.9 HEADACHE: Status: RESOLVED | Noted: 2023-07-31 | Resolved: 2023-07-31

## 2023-07-31 PROBLEM — N95.2 POSTMENOPAUSAL ATROPHIC VAGINITIS: Status: ACTIVE | Noted: 2023-07-31

## 2023-07-31 PROBLEM — E55.9 VITAMIN D DEFICIENCY: Status: ACTIVE | Noted: 2023-07-31

## 2023-07-31 PROBLEM — R79.89 LOW VITAMIN B12 LEVEL: Status: ACTIVE | Noted: 2023-07-31

## 2023-07-31 PROBLEM — N36.2 URETHRAL CARUNCLE: Status: ACTIVE | Noted: 2023-07-31

## 2023-07-31 PROBLEM — M19.049 ARTHRITIS OF HAND: Status: ACTIVE | Noted: 2023-07-31

## 2023-07-31 PROBLEM — K21.9 GERD (GASTROESOPHAGEAL REFLUX DISEASE): Status: ACTIVE | Noted: 2023-07-31

## 2023-07-31 PROBLEM — R12 HEARTBURN: Status: RESOLVED | Noted: 2023-07-31 | Resolved: 2023-07-31

## 2023-07-31 PROBLEM — R22.31 MASS OF RIGHT FOREARM: Status: RESOLVED | Noted: 2023-07-31 | Resolved: 2023-07-31

## 2023-07-31 PROBLEM — R31.9 HEMATURIA: Status: RESOLVED | Noted: 2023-07-31 | Resolved: 2023-07-31

## 2023-07-31 PROBLEM — R07.89 ATYPICAL CHEST PAIN: Status: RESOLVED | Noted: 2023-07-31 | Resolved: 2023-07-31

## 2023-07-31 PROBLEM — K76.89 HEPATIC CYST: Status: ACTIVE | Noted: 2023-07-31

## 2023-07-31 PROBLEM — M25.531 PAIN IN BOTH WRISTS: Status: RESOLVED | Noted: 2023-07-31 | Resolved: 2023-07-31

## 2023-07-31 PROBLEM — M62.838 TRAPEZIUS MUSCLE SPASM: Status: RESOLVED | Noted: 2023-07-31 | Resolved: 2023-07-31

## 2023-07-31 PROBLEM — L40.9 PSORIASIS: Status: ACTIVE | Noted: 2023-07-31

## 2023-07-31 PROBLEM — R20.0 NUMBNESS OF FINGER: Status: RESOLVED | Noted: 2023-07-31 | Resolved: 2023-07-31

## 2023-07-31 PROBLEM — R42 LIGHTHEADEDNESS: Status: RESOLVED | Noted: 2023-07-31 | Resolved: 2023-07-31

## 2023-07-31 PROBLEM — M47.812 DEGENERATIVE JOINT DISEASE OF CERVICAL SPINE: Status: RESOLVED | Noted: 2023-07-31 | Resolved: 2023-07-31

## 2023-07-31 PROCEDURE — 1126F AMNT PAIN NOTED NONE PRSNT: CPT | Performed by: INTERNAL MEDICINE

## 2023-07-31 PROCEDURE — 1159F MED LIST DOCD IN RCRD: CPT | Performed by: INTERNAL MEDICINE

## 2023-07-31 PROCEDURE — 99397 PER PM REEVAL EST PAT 65+ YR: CPT | Performed by: INTERNAL MEDICINE

## 2023-07-31 PROCEDURE — 1170F FXNL STATUS ASSESSED: CPT | Performed by: INTERNAL MEDICINE

## 2023-07-31 PROCEDURE — 1036F TOBACCO NON-USER: CPT | Performed by: INTERNAL MEDICINE

## 2023-07-31 PROCEDURE — G0444 DEPRESSION SCREEN ANNUAL: HCPCS | Performed by: INTERNAL MEDICINE

## 2023-07-31 PROCEDURE — 1160F RVW MEDS BY RX/DR IN RCRD: CPT | Performed by: INTERNAL MEDICINE

## 2023-07-31 PROCEDURE — G0439 PPPS, SUBSEQ VISIT: HCPCS | Performed by: INTERNAL MEDICINE

## 2023-07-31 PROCEDURE — 1123F ACP DISCUSS/DSCN MKR DOCD: CPT | Performed by: INTERNAL MEDICINE

## 2023-07-31 RX ORDER — VITAMIN B COMPLEX
1 CAPSULE ORAL
COMMUNITY

## 2023-07-31 RX ORDER — GUAIFENESIN AND PHENYLEPHRINE HCL 400; 10 MG/1; MG/1
1 TABLET ORAL DAILY
COMMUNITY
Start: 2021-07-21

## 2023-07-31 SDOH — ECONOMIC STABILITY: FOOD INSECURITY: WITHIN THE PAST 12 MONTHS, THE FOOD YOU BOUGHT JUST DIDN'T LAST AND YOU DIDN'T HAVE MONEY TO GET MORE.: NEVER TRUE

## 2023-07-31 SDOH — ECONOMIC STABILITY: INCOME INSECURITY: IN THE LAST 12 MONTHS, WAS THERE A TIME WHEN YOU WERE NOT ABLE TO PAY THE MORTGAGE OR RENT ON TIME?: NO

## 2023-07-31 SDOH — ECONOMIC STABILITY: TRANSPORTATION INSECURITY
IN THE PAST 12 MONTHS, HAS THE LACK OF TRANSPORTATION KEPT YOU FROM MEDICAL APPOINTMENTS OR FROM GETTING MEDICATIONS?: NO

## 2023-07-31 SDOH — ECONOMIC STABILITY: HOUSING INSECURITY
IN THE LAST 12 MONTHS, WAS THERE A TIME WHEN YOU DID NOT HAVE A STEADY PLACE TO SLEEP OR SLEPT IN A SHELTER (INCLUDING NOW)?: NO

## 2023-07-31 SDOH — ECONOMIC STABILITY: HOUSING INSECURITY: IN THE LAST 12 MONTHS, HOW MANY PLACES HAVE YOU LIVED?: 1

## 2023-07-31 SDOH — ECONOMIC STABILITY: TRANSPORTATION INSECURITY
IN THE PAST 12 MONTHS, HAS LACK OF TRANSPORTATION KEPT YOU FROM MEETINGS, WORK, OR FROM GETTING THINGS NEEDED FOR DAILY LIVING?: NO

## 2023-07-31 SDOH — ECONOMIC STABILITY: FOOD INSECURITY: WITHIN THE PAST 12 MONTHS, YOU WORRIED THAT YOUR FOOD WOULD RUN OUT BEFORE YOU GOT MONEY TO BUY MORE.: NEVER TRUE

## 2023-07-31 SDOH — HEALTH STABILITY: PHYSICAL HEALTH: ON AVERAGE, HOW MANY DAYS PER WEEK DO YOU ENGAGE IN MODERATE TO STRENUOUS EXERCISE (LIKE A BRISK WALK)?: 6 DAYS

## 2023-07-31 SDOH — HEALTH STABILITY: PHYSICAL HEALTH: ON AVERAGE, HOW MANY MINUTES DO YOU ENGAGE IN EXERCISE AT THIS LEVEL?: 60 MIN

## 2023-07-31 ASSESSMENT — ACTIVITIES OF DAILY LIVING (ADL)
ASSISTIVE_DEVICE: EYEGLASSES
PATIENT'S MEMORY ADEQUATE TO SAFELY COMPLETE DAILY ACTIVITIES?: YES
TOILETING: INDEPENDENT
HEARING - LEFT EAR: FUNCTIONAL
DRESSING YOURSELF: INDEPENDENT
ADEQUATE_TO_COMPLETE_ADL: YES
JUDGMENT_ADEQUATE_SAFELY_COMPLETE_DAILY_ACTIVITIES: YES
BATHING: INDEPENDENT
FEEDING YOURSELF: INDEPENDENT
GROOMING: INDEPENDENT
WALKS IN HOME: INDEPENDENT
HEARING - RIGHT EAR: FUNCTIONAL

## 2023-07-31 ASSESSMENT — SOCIAL DETERMINANTS OF HEALTH (SDOH)
HOW OFTEN DO YOU ATTEND CHURCH OR RELIGIOUS SERVICES?: 1 TO 4 TIMES PER YEAR
DO YOU BELONG TO ANY CLUBS OR ORGANIZATIONS SUCH AS CHURCH GROUPS UNIONS, FRATERNAL OR ATHLETIC GROUPS, OR SCHOOL GROUPS?: YES
HOW OFTEN DO YOU GET TOGETHER WITH FRIENDS OR RELATIVES?: TWICE A WEEK
HOW HARD IS IT FOR YOU TO PAY FOR THE VERY BASICS LIKE FOOD, HOUSING, MEDICAL CARE, AND HEATING?: NOT HARD AT ALL
WITHIN THE LAST YEAR, HAVE YOU BEEN HUMILIATED OR EMOTIONALLY ABUSED IN OTHER WAYS BY YOUR PARTNER OR EX-PARTNER?: NO
WITHIN THE LAST YEAR, HAVE YOU BEEN AFRAID OF YOUR PARTNER OR EX-PARTNER?: NO
IN A TYPICAL WEEK, HOW MANY TIMES DO YOU TALK ON THE PHONE WITH FAMILY, FRIENDS, OR NEIGHBORS?: MORE THAN THREE TIMES A WEEK
WITHIN THE LAST YEAR, HAVE YOU BEEN KICKED, HIT, SLAPPED, OR OTHERWISE PHYSICALLY HURT BY YOUR PARTNER OR EX-PARTNER?: NO
IN THE PAST 12 MONTHS, HAS THE ELECTRIC, GAS, OIL, OR WATER COMPANY THREATENED TO SHUT OFF SERVICE IN YOUR HOME?: NO
WITHIN THE LAST YEAR, HAVE TO BEEN RAPED OR FORCED TO HAVE ANY KIND OF SEXUAL ACTIVITY BY YOUR PARTNER OR EX-PARTNER?: NO
HOW OFTEN DO YOU ATTENT MEETINGS OF THE CLUB OR ORGANIZATION YOU BELONG TO?: 1 TO 4 TIMES PER YEAR

## 2023-07-31 ASSESSMENT — ENCOUNTER SYMPTOMS
OCCASIONAL FEELINGS OF UNSTEADINESS: 0
DEPRESSION: 0
LOSS OF SENSATION IN FEET: 0

## 2023-07-31 ASSESSMENT — PAIN SCALES - GENERAL: PAINLEVEL: 0-NO PAIN

## 2023-07-31 NOTE — PATIENT INSTRUCTIONS
Thank you for coming in for your wellness visit.    Follow up in one year--call ahead to have lab orders placed prior to that visit.  Blood test in November to check A1c and hepatitis c screening.    If you have another episode that might be a food allergy will allergy refer.  Dermatologist for psoriasis as planned.    See your hand surgeon for trigger finger if it keeps bothering you. Try massage every night with a cream that has vitamin E in it.    Please do your health care power of .    Mammogram and bone density as planned.

## 2023-08-12 NOTE — RESULT ENCOUNTER NOTE
Please call the patient regarding her result.   TThe bone density showed very mild  bone thinning,called osteopenia, but not osteoporosis.   Recommend:   1.Keep up with calcium intake (diet and or supplement, 1200 mg daily total).   2.Keep up with Vitamin D (either dose we have discussed or 800-1000iu daily).  3.Weight bearing exercise. The National Osteoporosis Website has instructions for exercise to strengthen bones.  4.Bone DEXA should be repeated in 2 years.    Also her mammogram was negative. Repeat the mammogram annually.

## 2023-08-13 ASSESSMENT — PATIENT HEALTH QUESTIONNAIRE - PHQ9
1. LITTLE INTEREST OR PLEASURE IN DOING THINGS: NOT AT ALL
SUM OF ALL RESPONSES TO PHQ9 QUESTIONS 1 AND 2: 0
2. FEELING DOWN, DEPRESSED OR HOPELESS: NOT AT ALL

## 2023-08-14 ENCOUNTER — TELEPHONE (OUTPATIENT)
Dept: PRIMARY CARE | Facility: CLINIC | Age: 68
End: 2023-08-14
Payer: MEDICARE

## 2023-08-14 NOTE — TELEPHONE ENCOUNTER
----- Message from Maxine Fernández MD sent at 8/12/2023  6:12 PM EDT -----  Please call the patient regarding her result.   TThe bone density showed very mild  bone thinning,called osteopenia, but not osteoporosis.   Recommend:   1.Keep up with calcium intake (diet and or supplement, 1200 mg daily total).   2.Keep up with Vitamin D (either dose we have discussed or 800-1000iu daily).  3.Weight bearing exercise. The National Osteoporosis Website has instructions for exercise to strengthen bones.  4.Bone DEXA should be repeated in 2 years.    Also her mammogram was negative. Repeat the mammogram annually.

## 2023-12-01 ENCOUNTER — LAB (OUTPATIENT)
Dept: LAB | Facility: LAB | Age: 68
End: 2023-12-01
Payer: MEDICARE

## 2023-12-01 DIAGNOSIS — R73.09 ELEVATED HEMOGLOBIN A1C: ICD-10-CM

## 2023-12-01 DIAGNOSIS — Z11.59 NEED FOR HEPATITIS C SCREENING TEST: ICD-10-CM

## 2023-12-01 LAB
EST. AVERAGE GLUCOSE BLD GHB EST-MCNC: 123 MG/DL
HBA1C MFR BLD: 5.9 %
HCV AB SER QL: NONREACTIVE

## 2023-12-01 PROCEDURE — 86803 HEPATITIS C AB TEST: CPT

## 2023-12-01 PROCEDURE — 36415 COLL VENOUS BLD VENIPUNCTURE: CPT

## 2023-12-01 PROCEDURE — 83036 HEMOGLOBIN GLYCOSYLATED A1C: CPT

## 2023-12-04 NOTE — RESULT ENCOUNTER NOTE
Wendy, your hemoglobin A1c is similar to the last one and minimally elevated.  The hepatitis c screening test is negative which is good.Continue to follow a lower sugar/lower glycemic index diet.

## 2023-12-20 ENCOUNTER — LAB (OUTPATIENT)
Dept: LAB | Facility: LAB | Age: 68
End: 2023-12-20
Payer: MEDICARE

## 2023-12-20 DIAGNOSIS — L40.0 PSORIASIS VULGARIS: Primary | ICD-10-CM

## 2023-12-20 PROCEDURE — 86481 TB AG RESPONSE T-CELL SUSP: CPT

## 2023-12-20 PROCEDURE — 36415 COLL VENOUS BLD VENIPUNCTURE: CPT

## 2023-12-22 LAB
NIL(NEG) CONTROL SPOT COUNT: NORMAL
PANEL A SPOT COUNT: 0
PANEL B SPOT COUNT: 0
POS CONTROL SPOT COUNT: NORMAL
T-SPOT. TB INTERPRETATION: NEGATIVE

## 2024-01-23 DIAGNOSIS — K21.9 GASTROESOPHAGEAL REFLUX DISEASE, UNSPECIFIED WHETHER ESOPHAGITIS PRESENT: ICD-10-CM

## 2024-01-23 DIAGNOSIS — M19.049 ARTHRITIS OF HAND: ICD-10-CM

## 2024-01-23 RX ORDER — OMEPRAZOLE 20 MG/1
CAPSULE, DELAYED RELEASE ORAL
Qty: 90 CAPSULE | Refills: 0 | Status: SHIPPED | OUTPATIENT
Start: 2024-01-23

## 2024-01-23 RX ORDER — IBUPROFEN 600 MG/1
600 TABLET ORAL EVERY 8 HOURS PRN
Qty: 90 TABLET | Refills: 1 | Status: SHIPPED | OUTPATIENT
Start: 2024-01-23

## 2024-01-23 NOTE — TELEPHONE ENCOUNTER
Please refill.    omeprazole (PriLOSEC) 20 mg DR capsule   TAKE 1 CAPSULE Daily 30 minutes before a meal if taking ibuprofen     ibuprofen 600 mg tablet   Take by mouth   [facility]

## 2024-02-19 ENCOUNTER — TELEPHONE (OUTPATIENT)
Dept: PRIMARY CARE | Facility: CLINIC | Age: 69
End: 2024-02-19

## 2024-02-19 NOTE — TELEPHONE ENCOUNTER
"Pt called and stated she believes she has a \" Inguinal or Femoral Hernia\". She noticed she has a big bubble located in her vagina area about a month ago. It is hard for her to use the bathroom because it sticks out and she is able to push it back in. When she sits down she is in a lot of pain. Pt had a carbuncle in the past and it was lanced off.   Pt # [phone]  "

## 2024-02-21 NOTE — TELEPHONE ENCOUNTER
I called her back. She sees Dr Campbell for her caruncle. She c/o urethral outpouching that comes and goes. She was instructed that she likely has another/recurring caruncle, or possibly urethral prolapse or other prolapse. I advised her that she needs a pelvic exam with Dr Campbell and encouraged her to keep her apt with her on 3/6/24. She was also advised to go to the ER if she has retention or bleeding or fever. She verbalized instructions.

## 2024-03-09 NOTE — PROGRESS NOTES
Urogynecology  Provider:  Zeynep Campbell MD  [phone]              ASSESSMENT AND PLAN:     Problem List Items Addressed This Visit    None          I spent a total of eConsult Time: 30 minutes in face to face and non face to face time.        Zeynep Campbell MD    HISTORY OF PRESENT ILLNESS    Wendy Lawler a 68 y.o. here today for a follow up     Prolapse symptoms:  - Concerned she has prolapse. This is new for her and quite bothersome.  Started with ski season last year but much worse this year.  - Reports she is bothered by the bulge    - She is sexually active   - Most bothered by the bulge during alexander season and working out     Urinary symptoms:  -  Using TV estrogen cream and this helps her caruncle     Past Medical History:   Diagnosis Date    Abnormal electrocardiogram (ECG) (EKG)     Abnormal electrocardiogram    Acute maxillary sinusitis 07/31/2023    Anxiety disorder, unspecified     Anxiety    Atypical chest pain 07/31/2023    Body mass index (BMI) 25.0-25.9, adult 11/18/2019    BMI 25.0-25.9,adult    Closed fracture of pelvis (CMS/HCC) 02/29/2016    Effusion, unspecified knee 03/10/2014    Effusion of knee    Encounter for general adult medical examination without abnormal findings 07/21/2021    Welcome to Medicare preventive visit    Encounter for immunization 04/29/2019    Need for Td vaccine    Encounter for screening for malignant neoplasm of colon 04/07/2017    Screen for colon cancer    Encounter for screening for osteoporosis 04/28/2017    Screening for osteoporosis    Headache 07/31/2023    Heartburn 07/31/2023    Hematuria 07/31/2023    Impaired fasting glucose 04/07/2022    Fasting hyperglycemia    Lightheadedness 07/31/2023    Low back pain, unspecified 07/21/2021    Left low back pain    Mass of right forearm 07/31/2023    Nausea with vomiting, unspecified 10/03/2017    Nausea, vomiting, and diarrhea    Other chest pain 04/30/2019    Atypical chest pain    Other  intra-abdominal and pelvic swelling, mass and lump 2020    Bulge in groin area    Other microscopic hematuria 2016    Microhematuria    Pain in right knee 03/10/2014    Right knee pain    Pain in unspecified knee 2014    Joint pain, knee    Palpitations 2023    Personal history of (healed) traumatic fracture 2020    History of fracture of pelvis    Personal history of other diseases of the digestive system 2020    History of bilateral inguinal hernias    Personal history of other diseases of the female genital tract     History of dysfunctional uterine bleeding    Personal history of other diseases of the musculoskeletal system and connective tissue     History of rotator cuff tear    Personal history of other diseases of the musculoskeletal system and connective tissue     History of fibromyositis    Personal history of other diseases of the respiratory system 2022    History of upper respiratory infection    Personal history of other diseases of the respiratory system 2022    History of laryngitis    Personal history of other endocrine, nutritional and metabolic disease     History of hypercalcemia    Personal history of other specified conditions 2022    History of fever    Productive cough 2023    Right hand paresthesia 2023    Right lower quadrant pain 2020    Bilateral groin pain    Trapezius muscle spasm 2023    Vitamin D deficiency, unspecified     Vitamin D deficiency          Past Surgical History:       Past Surgical History:   Procedure Laterality Date     SECTION, CLASSIC  2013     Section    COLONOSCOPY  2013    Colonoscopy (Fiberoptic)    DILATION AND CURETTAGE OF UTERUS  2013    Dilation And Curettage    OTHER SURGICAL HISTORY  2013    Cervix Cryosurgery    OTHER SURGICAL HISTORY  2013    Hysteroscopy    OTHER SURGICAL HISTORY  10/12/2021    Ulnar collateral ligament of elbow  reconstruction    OTHER SURGICAL HISTORY  10/12/2021    Wrist arthroscopy    OTHER SURGICAL HISTORY  10/12/2021    Ulnar collateral ligament of thumb rupture repair    OTHER SURGICAL HISTORY  10/12/2021    Carpal tunnel surgery    OTHER SURGICAL HISTORY  10/12/2021    Synovectomy    OTHER SURGICAL HISTORY  10/12/2021    Arthroplasty    OTHER SURGICAL HISTORY  10/12/2021    Metacarpophalangeal joint arthroplasty    SINUS SURGERY  03/01/2013    Sinus Surgery         Medications:       Prior to Admission medications    Medication Sig Start Date End Date Taking? Authorizing Provider   b complex vitamins capsule Take 1 capsule by mouth once daily.    Historical Provider, MD   calcipotriene (Dovonex) 0.005 % cream twice a day. 6/28/17   Historical Provider, MD   CALCIUM 26-VIT D3-MAGNESIUM 15 ORAL Take 1 capsule by mouth once daily. 7/21/21   Historical Provider, MD   cholecalciferol (Vitamin D-3) 125 MCG (5000 UT) capsule Take by mouth. 4/19/17   Historical Provider, MD   cyanocobalamin (Vitamin B-12) 1,000 mcg tablet Take 1 tablet (1,000 mcg) by mouth once daily.    Historical Provider, MD   estradiol (Estrace) 0.1 MG/GM vaginal cream Insert into the vagina. 3/26/20   Historical Provider, MD   ibuprofen 600 mg tablet Take 1 tablet (600 mg) by mouth every 8 hours if needed for moderate pain (4 - 6). 1/23/24   Maxine Fernández MD   omega 3-dha-epa-fish oil 1,000 mg (120 mg-180 mg) capsule Take by mouth.    Historical Provider, MD   omeprazole (PriLOSEC) 20 mg DR capsule TAKE 1 CAPSULE Daily 30 minutes before a meal if taking ibuprofen 1/23/24   Maxine Fernández MD   turmeric root extract 500 mg capsule Take 1 capsule by mouth once daily. 7/21/21   Historical Provider, MD   ZINC PICOLINATE, BULK, MISC Take 50 mg by mouth.    Historical Provider, MD        ROS  Review of Systems     Blood, Urine   Date Value Ref Range Status   07/21/2021 NEGATIVE NEGATIVE Final     Nitrite, Urine   Date Value Ref Range Status   07/21/2021  "NEGATIVE NEGATIVE Final     Urobilinogen, Urine   Date Value Ref Range Status   07/21/2021 <2.0 0.0 - 1.9 mg/dL Final         PHYSICAL EXAM:      There were no vitals taken for this visit.     No LMP recorded.      Declines chaperone for physical exam.      Well developed, well nourished, in no apparent distress.   Neurologic/Psychiatric:  Awake, Alert and Oriented times 3.  Affect normal.     GENITAL/URINARY:       External Genitalia:  The patient has normal appearing external genitalia, normal skenes and bartholins glands, and a normal hair distribution.  Her vulva is without lesions, erythema or discharge.  It is non-tender with appropriate sensation.     Urethral Meatus:  Size normal, Location normal, Lesions absent, Prolapse absent,      Urethra:  Fullness absent, Masses absent,  minimal caruncle     Bladder:  Fullness absent, Masses absent, Tenderness absent,      Vagina:  General appearance normal, Discharge absent, Lesions absent, moderately atrophic     Cervix: Normal, no discharge.   Uterus:  normal size  Adnexa: normal     Anus/Perineum:  Lesions absent and Masses absent defer exam  Normal Perineum    POP-Q:  Aa: +1       Ba:  C: -6   Gh:  Pb: TVL: 10         Ap: -2 Bp:  D: -7           She reports that POP is worse later in the day when she is skiing    Data and DIAGNOSTIC STUDIES REVIEWED   No results found.   No results found for: \"URINECULTURE\", \"UAMICCOMM\"   Lab Results   Component Value Date    GLUCOSE 90 05/19/2023    CALCIUM 9.8 05/19/2023     05/19/2023    K 5.0 05/19/2023    CO2 30 05/19/2023     05/19/2023    BUN 23 05/19/2023    CREATININE 0.81 05/19/2023     Lab Results   Component Value Date    WBC 4.6 05/19/2023    HGB 14.4 05/19/2023    HCT 43.0 05/19/2023    MCV 95 05/19/2023     05/19/2023      ASSESSMENT &PLAN     Assessment:   68 y.o. old female being assessed for cystocele and urethral caruncle     Diagnoses:   #1 Cystocele   #2 Urethral caruncle     Plan: "   Cystocele   - Discussed etiology of pelvic organ prolapse and treatment options such as continuing to monitor, pessary use or surgical intervention   - Pt will work out and come in for prolapse check later in the day so we can have more of a sense of the severity   - Discussed treatment options conservative and surgical but is likely to proceed with surgical intervention      2. Urethral caruncle   - stable  - Continue use of TV estrogen cream to be used twice a week     Follow-up in 4/1/24 for prolapse check and to discussed treatment     Scribe Attestation  By signing my name below, I, Ammy Bob , Scribe   attest that this documentation has been prepared under the direction and in the presence of Zeynep Campbell MD.     Agree with above. I Dr. Campbell, personally performed the services described in the documentation which was scribed virtually and confirm it is both complete and accurate.  Zeynep Campbell MD

## 2024-03-11 ENCOUNTER — OFFICE VISIT (OUTPATIENT)
Dept: OBSTETRICS AND GYNECOLOGY | Facility: CLINIC | Age: 69
End: 2024-03-11
Payer: MEDICARE

## 2024-03-11 VITALS
SYSTOLIC BLOOD PRESSURE: 118 MMHG | HEART RATE: 67 BPM | HEIGHT: 61 IN | BODY MASS INDEX: 26.43 KG/M2 | WEIGHT: 140 LBS | DIASTOLIC BLOOD PRESSURE: 75 MMHG

## 2024-03-11 DIAGNOSIS — R31.21 ASYMPTOMATIC MICROSCOPIC HEMATURIA: Primary | ICD-10-CM

## 2024-03-11 PROCEDURE — 1159F MED LIST DOCD IN RCRD: CPT | Performed by: OBSTETRICS & GYNECOLOGY

## 2024-03-11 PROCEDURE — 1126F AMNT PAIN NOTED NONE PRSNT: CPT | Performed by: OBSTETRICS & GYNECOLOGY

## 2024-03-11 PROCEDURE — 99214 OFFICE O/P EST MOD 30 MIN: CPT | Performed by: OBSTETRICS & GYNECOLOGY

## 2024-03-11 PROCEDURE — 1036F TOBACCO NON-USER: CPT | Performed by: OBSTETRICS & GYNECOLOGY

## 2024-03-11 ASSESSMENT — PAIN SCALES - GENERAL: PAINLEVEL: 0-NO PAIN

## 2024-04-01 ENCOUNTER — PREP FOR PROCEDURE (OUTPATIENT)
Dept: OBSTETRICS AND GYNECOLOGY | Facility: CLINIC | Age: 69
End: 2024-04-01

## 2024-04-01 ENCOUNTER — OFFICE VISIT (OUTPATIENT)
Dept: OBSTETRICS AND GYNECOLOGY | Facility: CLINIC | Age: 69
End: 2024-04-01
Payer: MEDICARE

## 2024-04-01 VITALS
DIASTOLIC BLOOD PRESSURE: 71 MMHG | HEART RATE: 79 BPM | SYSTOLIC BLOOD PRESSURE: 119 MMHG | HEIGHT: 61 IN | WEIGHT: 140 LBS | BODY MASS INDEX: 26.43 KG/M2

## 2024-04-01 DIAGNOSIS — N81.2 CYSTOCELE WITH SECOND DEGREE UTERINE PROLAPSE: ICD-10-CM

## 2024-04-01 DIAGNOSIS — Z01.818 PRE-OP TESTING: ICD-10-CM

## 2024-04-01 DIAGNOSIS — N81.4 CYSTOCELE WITH UTERINE PROLAPSE: Primary | ICD-10-CM

## 2024-04-01 DIAGNOSIS — N39.498 OTHER URINARY INCONTINENCE: Primary | ICD-10-CM

## 2024-04-01 DIAGNOSIS — N39.41 URGE INCONTINENCE: ICD-10-CM

## 2024-04-01 DIAGNOSIS — N32.81 OVERACTIVE BLADDER: ICD-10-CM

## 2024-04-01 PROCEDURE — 1159F MED LIST DOCD IN RCRD: CPT | Performed by: OBSTETRICS & GYNECOLOGY

## 2024-04-01 PROCEDURE — 1126F AMNT PAIN NOTED NONE PRSNT: CPT | Performed by: OBSTETRICS & GYNECOLOGY

## 2024-04-01 PROCEDURE — 1036F TOBACCO NON-USER: CPT | Performed by: OBSTETRICS & GYNECOLOGY

## 2024-04-01 PROCEDURE — 99214 OFFICE O/P EST MOD 30 MIN: CPT | Performed by: OBSTETRICS & GYNECOLOGY

## 2024-04-01 RX ORDER — GABAPENTIN 600 MG/1
600 TABLET ORAL ONCE
Status: CANCELLED | OUTPATIENT
Start: 2024-04-01 | End: 2024-04-01

## 2024-04-01 RX ORDER — ACETAMINOPHEN 325 MG/1
975 TABLET ORAL ONCE
Status: CANCELLED | OUTPATIENT
Start: 2024-04-01 | End: 2024-04-01

## 2024-04-01 RX ORDER — CELECOXIB 400 MG/1
400 CAPSULE ORAL ONCE
Status: CANCELLED | OUTPATIENT
Start: 2024-04-01 | End: 2024-04-01

## 2024-04-01 RX ORDER — PHENAZOPYRIDINE HYDROCHLORIDE 200 MG/1
200 TABLET, FILM COATED ORAL ONCE
Status: CANCELLED | OUTPATIENT
Start: 2024-04-01 | End: 2024-04-01

## 2024-04-01 ASSESSMENT — PAIN SCALES - GENERAL: PAINLEVEL: 0-NO PAIN

## 2024-04-01 NOTE — PROGRESS NOTES
Urogynecology  Provider:  Zeynep Campbell MD  [phone]              ASSESSMENT AND PLAN:     Problem List Items Addressed This Visit    None          I spent a total of  in face to face and non face to face time.        Zeynep Campbell MD    HISTORY OF PRESENT ILLNESS    Wendy rodriguez 68 y.o. here today for a follow up     Prolapse symptoms:  - Pt reports the prolapse is worsening  - She is not open to pessary use but would consider surgical intervention   - Surgery would have to be after May 11th, as she has a wedding that day    Past Medical History:   Diagnosis Date    Abnormal electrocardiogram (ECG) (EKG)     Abnormal electrocardiogram    Acute maxillary sinusitis 07/31/2023    Anxiety disorder, unspecified     Anxiety    Atypical chest pain 07/31/2023    Body mass index (BMI) 25.0-25.9, adult 11/18/2019    BMI 25.0-25.9,adult    Closed fracture of pelvis (CMS/HCC) 02/29/2016    Effusion, unspecified knee 03/10/2014    Effusion of knee    Encounter for general adult medical examination without abnormal findings 07/21/2021    Welcome to Medicare preventive visit    Encounter for immunization 04/29/2019    Need for Td vaccine    Encounter for screening for malignant neoplasm of colon 04/07/2017    Screen for colon cancer    Encounter for screening for osteoporosis 04/28/2017    Screening for osteoporosis    Headache 07/31/2023    Heartburn 07/31/2023    Hematuria 07/31/2023    Impaired fasting glucose 04/07/2022    Fasting hyperglycemia    Lightheadedness 07/31/2023    Low back pain, unspecified 07/21/2021    Left low back pain    Mass of right forearm 07/31/2023    Nausea with vomiting, unspecified 10/03/2017    Nausea, vomiting, and diarrhea    Other chest pain 04/30/2019    Atypical chest pain    Other intra-abdominal and pelvic swelling, mass and lump 07/07/2020    Bulge in groin area    Other microscopic hematuria 02/01/2016    Microhematuria    Pain in right knee 03/10/2014    Right knee  pain    Pain in unspecified knee 2014    Joint pain, knee    Palpitations 2023    Personal history of (healed) traumatic fracture 2020    History of fracture of pelvis    Personal history of other diseases of the digestive system 2020    History of bilateral inguinal hernias    Personal history of other diseases of the female genital tract     History of dysfunctional uterine bleeding    Personal history of other diseases of the musculoskeletal system and connective tissue     History of rotator cuff tear    Personal history of other diseases of the musculoskeletal system and connective tissue     History of fibromyositis    Personal history of other diseases of the respiratory system 2022    History of upper respiratory infection    Personal history of other diseases of the respiratory system 2022    History of laryngitis    Personal history of other endocrine, nutritional and metabolic disease     History of hypercalcemia    Personal history of other specified conditions 2022    History of fever    Productive cough 2023    Right hand paresthesia 2023    Right lower quadrant pain 2020    Bilateral groin pain    Trapezius muscle spasm 2023    Vitamin D deficiency, unspecified     Vitamin D deficiency          Past Surgical History:       Past Surgical History:   Procedure Laterality Date     SECTION, CLASSIC  2013     Section    COLONOSCOPY  2013    Colonoscopy (Fiberoptic)    DILATION AND CURETTAGE OF UTERUS  2013    Dilation And Curettage    OTHER SURGICAL HISTORY  2013    Cervix Cryosurgery    OTHER SURGICAL HISTORY  2013    Hysteroscopy    OTHER SURGICAL HISTORY  10/12/2021    Ulnar collateral ligament of elbow reconstruction    OTHER SURGICAL HISTORY  10/12/2021    Wrist arthroscopy    OTHER SURGICAL HISTORY  10/12/2021    Ulnar collateral ligament of thumb rupture repair    OTHER SURGICAL HISTORY   10/12/2021    Carpal tunnel surgery    OTHER SURGICAL HISTORY  10/12/2021    Synovectomy    OTHER SURGICAL HISTORY  10/12/2021    Arthroplasty    OTHER SURGICAL HISTORY  10/12/2021    Metacarpophalangeal joint arthroplasty    SINUS SURGERY  03/01/2013    Sinus Surgery         Medications:       Prior to Admission medications    Medication Sig Start Date End Date Taking? Authorizing Provider   b complex vitamins capsule Take 1 capsule by mouth once daily.    Historical Provider, MD   calcipotriene (Dovonex) 0.005 % cream twice a day. 6/28/17   Historical Provider, MD   CALCIUM 26-VIT D3-MAGNESIUM 15 ORAL Take 1 capsule by mouth once daily. 7/21/21   Historical Provider, MD   cholecalciferol (Vitamin D-3) 125 MCG (5000 UT) capsule Take by mouth. 4/19/17   Historical Provider, MD   cyanocobalamin (Vitamin B-12) 1,000 mcg tablet Take 1 tablet (1,000 mcg) by mouth once daily.    Historical Provider, MD   estradiol (Estrace) 0.1 MG/GM vaginal cream Insert into the vagina. 3/26/20   Historical Provider, MD   ibuprofen 600 mg tablet Take 1 tablet (600 mg) by mouth every 8 hours if needed for moderate pain (4 - 6). 1/23/24   Maxine Fernández MD   omega 3-dha-epa-fish oil 1,000 mg (120 mg-180 mg) capsule Take by mouth.    Historical Provider, MD   omeprazole (PriLOSEC) 20 mg DR capsule TAKE 1 CAPSULE Daily 30 minutes before a meal if taking ibuprofen 1/23/24   Maxine Fernández MD   turmeric root extract 500 mg capsule Take 1 capsule by mouth once daily. 7/21/21   Historical Provider, MD   ZINC PICOLINATE, BULK, MISC Take 50 mg by mouth.    Historical Provider, MD        ROS  Review of Systems     Blood, Urine   Date Value Ref Range Status   07/21/2021 NEGATIVE NEGATIVE Final     Nitrite, Urine   Date Value Ref Range Status   07/21/2021 NEGATIVE NEGATIVE Final     Urobilinogen, Urine   Date Value Ref Range Status   07/21/2021 <2.0 0.0 - 1.9 mg/dL Final         PHYSICAL EXAM:      There were no vitals taken for this visit.    "  No LMP recorded.      Declines chaperone for physical exam.      Well developed, well nourished, in no apparent distress.   Neurologic/Psychiatric:  Awake, Alert and Oriented times 3.  Affect normal.     GENITAL/URINARY:       External Genitalia:  The patient has normal appearing external genitalia, normal skenes and bartholins glands, and a normal hair distribution.  Her vulva is without lesions, erythema or discharge.  It is non-tender with appropriate sensation.     Urethral Meatus:  Size normal, Location normal, Lesions absent, Prolapse absent,      Urethra:  Fullness absent, Masses absent,      Bladder:  Fullness absent, Masses absent, Tenderness absent,      Vagina:  General appearance normal, Estrogen effect normal, Discharge absent, Lesions absent     Cervix: Normal, no discharge.   Uterus:  normal size  Adnexa: normal     Anus/Perineum:  Lesions absent and Masses absent defer exam  Normal Perineum    POP-Q:    Aa: +3       Ba:  C: -5   Gh:  Pb:  TVL: 10         Ap: -1 Bp:  D: -6               Data and DIAGNOSTIC STUDIES REVIEWED   No results found.   No results found for: \"URINECULTURE\", \"UAMICCOMM\"   Lab Results   Component Value Date    GLUCOSE 90 05/19/2023    CALCIUM 9.8 05/19/2023     05/19/2023    K 5.0 05/19/2023    CO2 30 05/19/2023     05/19/2023    BUN 23 05/19/2023    CREATININE 0.81 05/19/2023     Lab Results   Component Value Date    WBC 4.6 05/19/2023    HGB 14.4 05/19/2023    HCT 43.0 05/19/2023    MCV 95 05/19/2023     05/19/2023      ASSESSMENT &PLAN     Assessment:   68 y.o. old female being assessed for cystocele with uterine descent     Diagnoses:   #1 Cystocele with uterine descent     Plan:   Cystocele with uterine descent   - Discussed pessary use, pt not open to this  - Discussed total laparoscopic hysterectomy, BSO, USLS, possible AR/AK, possible MUS, perineorrhaphy, cysto  - Surgical request entered, pt advised [facility] would be reaching out regarding OR " scheduling   - Discussed surgery risks, benefits and post-restrictions  - Discussed need for UDS testing pre-op, pt amendable to this      Follow-up as scheduled for UDS and virtually for results      Scribe Attestation  By signing my name below, I, Ammy Bob , Cassidy   attest that this documentation has been prepared under the direction and in the presence of Zeynep Campbell MD.     Discussed options for treatment and pros and cons and she would like to proceed with definitive surgery.  Info sent to [facility]  Will do UDN  Agree with above. I Dr. Campbell, personally performed the services described in the documentation which was scribed virtually and confirm it is both complete and accurate.  Zeynep Campbell MD

## 2024-04-04 ENCOUNTER — TELEPHONE (OUTPATIENT)
Dept: OBSTETRICS AND GYNECOLOGY | Facility: CLINIC | Age: 69
End: 2024-04-04
Payer: MEDICARE

## 2024-04-04 PROBLEM — N81.2 CYSTOCELE WITH SECOND DEGREE UTERINE PROLAPSE: Status: ACTIVE | Noted: 2024-04-01

## 2024-04-04 PROBLEM — R32 ABSENCE OF BLADDER CONTINENCE: Status: ACTIVE | Noted: 2024-04-01

## 2024-04-04 NOTE — TELEPHONE ENCOUNTER
Call to patient after receiving message; that she would like to schedule surgery with Dr Campbell at [facility]. Patient agreed to date of 5/24/2024 for a total laparoscopic hysterectomy, bilateral salpingectomy-oophorectomy, laparoscopic colpopexy, anterior and posterior repairs with possible perineorraphy, mid-urethral sling and cystoscopy. PAT has been ordered.

## 2024-04-23 ENCOUNTER — APPOINTMENT (OUTPATIENT)
Dept: OBSTETRICS AND GYNECOLOGY | Facility: CLINIC | Age: 69
End: 2024-04-23
Payer: MEDICARE

## 2024-04-25 ENCOUNTER — TELEPHONE (OUTPATIENT)
Dept: PRIMARY CARE | Facility: CLINIC | Age: 69
End: 2024-04-25
Payer: MEDICARE

## 2024-04-25 NOTE — TELEPHONE ENCOUNTER
Doctor Pradeep patient calling- started Sunday,pain in back now into abdomen. Regular bowel movements.  No other symptoms. Sitting down is okay, when she gets walking, fells like she has to hold abdomen. Needs further evaluation.  [phone]

## 2024-04-25 NOTE — TELEPHONE ENCOUNTER
Pt notified of Dr. PAULSON's recommendation.  (Hysterectomy will be performed due to vaginal/uterine prolapse.)  I told her that Dr. GREWAL, (PCP) will be informed of her call, and plan.  igor

## 2024-04-25 NOTE — TELEPHONE ENCOUNTER
As DM reported, the pt with sx since 4-, began suddenly.  Pain at Left lower back radiating to L lower abdomen.  Appetite is fine, Pt is scheduled for hysterectomy @ end of May.  She states no blood in urine or stool, Denies fever or vaginal discharge.  She cannot identify precipitating activity or event.  She notes that when she has an episode of this pain, it is relieved with sitting down.  She did have a brief episode of mild nausea today.    Please advise.  Task forwarded to [facility] on call for URI.  igor

## 2024-04-26 ENCOUNTER — HOSPITAL ENCOUNTER (EMERGENCY)
Facility: HOSPITAL | Age: 69
Discharge: HOME | End: 2024-04-26
Attending: FAMILY MEDICINE
Payer: MEDICARE

## 2024-04-26 ENCOUNTER — APPOINTMENT (OUTPATIENT)
Dept: RADIOLOGY | Facility: HOSPITAL | Age: 69
End: 2024-04-26
Payer: MEDICARE

## 2024-04-26 VITALS
RESPIRATION RATE: 16 BRPM | BODY MASS INDEX: 27.17 KG/M2 | TEMPERATURE: 97.5 F | DIASTOLIC BLOOD PRESSURE: 80 MMHG | OXYGEN SATURATION: 99 % | WEIGHT: 143.9 LBS | HEART RATE: 65 BPM | SYSTOLIC BLOOD PRESSURE: 130 MMHG | HEIGHT: 61 IN

## 2024-04-26 DIAGNOSIS — K59.09 OTHER CONSTIPATION: ICD-10-CM

## 2024-04-26 DIAGNOSIS — R11.0 NAUSEA WITHOUT VOMITING: ICD-10-CM

## 2024-04-26 DIAGNOSIS — N30.00 ACUTE CYSTITIS WITHOUT HEMATURIA: ICD-10-CM

## 2024-04-26 DIAGNOSIS — R10.9 ACUTE ABDOMINAL PAIN IN LEFT FLANK: Primary | ICD-10-CM

## 2024-04-26 DIAGNOSIS — N83.201 CYST OF RIGHT OVARY: ICD-10-CM

## 2024-04-26 LAB
ALBUMIN SERPL BCP-MCNC: 4.1 G/DL (ref 3.4–5)
ALP SERPL-CCNC: 64 U/L (ref 33–136)
ALT SERPL W P-5'-P-CCNC: 13 U/L (ref 7–45)
ANION GAP SERPL CALC-SCNC: 12 MMOL/L (ref 10–20)
APPEARANCE UR: CLEAR
AST SERPL W P-5'-P-CCNC: 13 U/L (ref 9–39)
BASOPHILS # BLD AUTO: 0.01 X10*3/UL (ref 0–0.1)
BASOPHILS NFR BLD AUTO: 0.2 %
BILIRUB SERPL-MCNC: 0.4 MG/DL (ref 0–1.2)
BILIRUB UR STRIP.AUTO-MCNC: NEGATIVE MG/DL
BUN SERPL-MCNC: 22 MG/DL (ref 6–23)
CALCIUM SERPL-MCNC: 9 MG/DL (ref 8.6–10.3)
CHLORIDE SERPL-SCNC: 104 MMOL/L (ref 98–107)
CO2 SERPL-SCNC: 26 MMOL/L (ref 21–32)
COLOR UR: YELLOW
CREAT SERPL-MCNC: 0.7 MG/DL (ref 0.5–1.05)
EGFRCR SERPLBLD CKD-EPI 2021: >90 ML/MIN/1.73M*2
EOSINOPHIL # BLD AUTO: 0.1 X10*3/UL (ref 0–0.7)
EOSINOPHIL NFR BLD AUTO: 1.7 %
ERYTHROCYTE [DISTWIDTH] IN BLOOD BY AUTOMATED COUNT: 12.4 % (ref 11.5–14.5)
GLUCOSE SERPL-MCNC: 110 MG/DL (ref 74–99)
GLUCOSE UR STRIP.AUTO-MCNC: NEGATIVE MG/DL
HCT VFR BLD AUTO: 40.7 % (ref 36–46)
HGB BLD-MCNC: 13.6 G/DL (ref 12–16)
HOLD SPECIMEN: NORMAL
IMM GRANULOCYTES # BLD AUTO: 0.01 X10*3/UL (ref 0–0.7)
IMM GRANULOCYTES NFR BLD AUTO: 0.2 % (ref 0–0.9)
KETONES UR STRIP.AUTO-MCNC: NEGATIVE MG/DL
LEUKOCYTE ESTERASE UR QL STRIP.AUTO: ABNORMAL
LYMPHOCYTES # BLD AUTO: 2.19 X10*3/UL (ref 1.2–4.8)
LYMPHOCYTES NFR BLD AUTO: 36.4 %
MCH RBC QN AUTO: 30.6 PG (ref 26–34)
MCHC RBC AUTO-ENTMCNC: 33.4 G/DL (ref 32–36)
MCV RBC AUTO: 92 FL (ref 80–100)
MONOCYTES # BLD AUTO: 0.57 X10*3/UL (ref 0.1–1)
MONOCYTES NFR BLD AUTO: 9.5 %
NEUTROPHILS # BLD AUTO: 3.14 X10*3/UL (ref 1.2–7.7)
NEUTROPHILS NFR BLD AUTO: 52 %
NITRITE UR QL STRIP.AUTO: NEGATIVE
NRBC BLD-RTO: NORMAL /100{WBCS}
PH UR STRIP.AUTO: 7 [PH]
PLATELET # BLD AUTO: 254 X10*3/UL (ref 150–450)
POTASSIUM SERPL-SCNC: 4.3 MMOL/L (ref 3.5–5.3)
PROT SERPL-MCNC: 6.6 G/DL (ref 6.4–8.2)
PROT UR STRIP.AUTO-MCNC: NEGATIVE MG/DL
RBC # BLD AUTO: 4.44 X10*6/UL (ref 4–5.2)
RBC # UR STRIP.AUTO: NEGATIVE /UL
RBC #/AREA URNS AUTO: NORMAL /HPF
SODIUM SERPL-SCNC: 138 MMOL/L (ref 136–145)
SP GR UR STRIP.AUTO: 1.02
UROBILINOGEN UR STRIP.AUTO-MCNC: 0.2 MG/DL
WBC # BLD AUTO: 6 X10*3/UL (ref 4.4–11.3)
WBC #/AREA URNS AUTO: NORMAL /HPF

## 2024-04-26 PROCEDURE — 80053 COMPREHEN METABOLIC PANEL: CPT | Performed by: FAMILY MEDICINE

## 2024-04-26 PROCEDURE — 96374 THER/PROPH/DIAG INJ IV PUSH: CPT

## 2024-04-26 PROCEDURE — 74176 CT ABD & PELVIS W/O CONTRAST: CPT

## 2024-04-26 PROCEDURE — 99284 EMERGENCY DEPT VISIT MOD MDM: CPT | Mod: 25

## 2024-04-26 PROCEDURE — 81001 URINALYSIS AUTO W/SCOPE: CPT | Performed by: FAMILY MEDICINE

## 2024-04-26 PROCEDURE — 36415 COLL VENOUS BLD VENIPUNCTURE: CPT | Performed by: FAMILY MEDICINE

## 2024-04-26 PROCEDURE — 85025 COMPLETE CBC W/AUTO DIFF WBC: CPT | Performed by: FAMILY MEDICINE

## 2024-04-26 PROCEDURE — 74176 CT ABD & PELVIS W/O CONTRAST: CPT | Performed by: RADIOLOGY

## 2024-04-26 PROCEDURE — 96361 HYDRATE IV INFUSION ADD-ON: CPT

## 2024-04-26 PROCEDURE — 2500000004 HC RX 250 GENERAL PHARMACY W/ HCPCS (ALT 636 FOR OP/ED): Performed by: FAMILY MEDICINE

## 2024-04-26 RX ORDER — SULFAMETHOXAZOLE AND TRIMETHOPRIM 800; 160 MG/1; MG/1
1 TABLET ORAL 2 TIMES DAILY
Qty: 14 TABLET | Refills: 0 | Status: SHIPPED | OUTPATIENT
Start: 2024-04-26 | End: 2024-05-03 | Stop reason: WASHOUT

## 2024-04-26 RX ORDER — KETOROLAC TROMETHAMINE 30 MG/ML
30 INJECTION, SOLUTION INTRAMUSCULAR; INTRAVENOUS ONCE
Status: COMPLETED | OUTPATIENT
Start: 2024-04-26 | End: 2024-04-26

## 2024-04-26 RX ADMIN — SODIUM CHLORIDE 1000 ML: 900 INJECTION, SOLUTION INTRAVENOUS at 09:22

## 2024-04-26 RX ADMIN — KETOROLAC TROMETHAMINE 30 MG: 30 INJECTION, SOLUTION INTRAMUSCULAR at 12:08

## 2024-04-26 ASSESSMENT — PAIN DESCRIPTION - FREQUENCY: FREQUENCY: INTERMITTENT

## 2024-04-26 ASSESSMENT — PAIN SCALES - GENERAL
PAINLEVEL_OUTOF10: 7
PAINLEVEL_OUTOF10: 7

## 2024-04-26 ASSESSMENT — PAIN - FUNCTIONAL ASSESSMENT
PAIN_FUNCTIONAL_ASSESSMENT: 0-10

## 2024-04-26 ASSESSMENT — COLUMBIA-SUICIDE SEVERITY RATING SCALE - C-SSRS
6. HAVE YOU EVER DONE ANYTHING, STARTED TO DO ANYTHING, OR PREPARED TO DO ANYTHING TO END YOUR LIFE?: NO
2. HAVE YOU ACTUALLY HAD ANY THOUGHTS OF KILLING YOURSELF?: NO
1. IN THE PAST MONTH, HAVE YOU WISHED YOU WERE DEAD OR WISHED YOU COULD GO TO SLEEP AND NOT WAKE UP?: NO

## 2024-04-26 ASSESSMENT — PAIN DESCRIPTION - PAIN TYPE: TYPE: ACUTE PAIN

## 2024-04-26 ASSESSMENT — PAIN DESCRIPTION - DESCRIPTORS: DESCRIPTORS: ACHING;SHARP

## 2024-04-26 ASSESSMENT — PAIN DESCRIPTION - LOCATION: LOCATION: OTHER (COMMENT)

## 2024-04-26 ASSESSMENT — PAIN DESCRIPTION - ORIENTATION
ORIENTATION: LEFT
ORIENTATION: LEFT

## 2024-04-26 NOTE — ED PROVIDER NOTES
HPI   Chief Complaint   Patient presents with    Flank Pain     Left flank pain that radiates to left lower abd that began suddenly on Sunday, worse with movement/activity.  Denies fevers, chills, urinary symptoms, or hematuria.         69-year-old Prediabetic female with a history of uterine prolapse, scheduled for surgery next month.      Patient presents with a 5-day history of left flank pain.  Reports the pain radiates to the left lower quadrant.  Aggravated by walking.  Alleviated by sitting.  She has had occasional nausea without vomiting.  The pain comes and goes.     She denies fevers, sweats, chills, vomiting, constipation, diarrhea.  No urinary symptoms.    He has no history of kidney stones.      History provided by:  Patient   used: No                        Ricardo Coma Scale Score: 15                     Patient History   Past Medical History:   Diagnosis Date    Abnormal electrocardiogram (ECG) (EKG)     Abnormal electrocardiogram    Acute maxillary sinusitis 07/31/2023    Anxiety disorder, unspecified     Anxiety    Atypical chest pain 07/31/2023    Body mass index (BMI) 25.0-25.9, adult 11/18/2019    BMI 25.0-25.9,adult    Closed fracture of pelvis (Multi) 02/29/2016    Effusion, unspecified knee 03/10/2014    Effusion of knee    Encounter for general adult medical examination without abnormal findings 07/21/2021    Welcome to Medicare preventive visit    Encounter for immunization 04/29/2019    Need for Td vaccine    Encounter for screening for malignant neoplasm of colon 04/07/2017    Screen for colon cancer    Encounter for screening for osteoporosis 04/28/2017    Screening for osteoporosis    Headache 07/31/2023    Heartburn 07/31/2023    Hematuria 07/31/2023    Impaired fasting glucose 04/07/2022    Fasting hyperglycemia    Lightheadedness 07/31/2023    Low back pain, unspecified 07/21/2021    Left low back pain    Mass of right forearm 07/31/2023    Nausea with vomiting,  unspecified 10/03/2017    Nausea, vomiting, and diarrhea    Other chest pain 2019    Atypical chest pain    Other intra-abdominal and pelvic swelling, mass and lump 2020    Bulge in groin area    Other microscopic hematuria 2016    Microhematuria    Pain in right knee 03/10/2014    Right knee pain    Pain in unspecified knee 2014    Joint pain, knee    Palpitations 2023    Personal history of (healed) traumatic fracture 2020    History of fracture of pelvis    Personal history of other diseases of the digestive system 2020    History of bilateral inguinal hernias    Personal history of other diseases of the female genital tract     History of dysfunctional uterine bleeding    Personal history of other diseases of the musculoskeletal system and connective tissue     History of rotator cuff tear    Personal history of other diseases of the musculoskeletal system and connective tissue     History of fibromyositis    Personal history of other diseases of the respiratory system 2022    History of upper respiratory infection    Personal history of other diseases of the respiratory system 2022    History of laryngitis    Personal history of other endocrine, nutritional and metabolic disease     History of hypercalcemia    Personal history of other specified conditions 2022    History of fever    Productive cough 2023    Right hand paresthesia 2023    Right lower quadrant pain 2020    Bilateral groin pain    Trapezius muscle spasm 2023    Vitamin D deficiency, unspecified     Vitamin D deficiency     Past Surgical History:   Procedure Laterality Date     SECTION, CLASSIC  2013     Section    COLONOSCOPY  2013    Colonoscopy (Fiberoptic)    DILATION AND CURETTAGE OF UTERUS  2013    Dilation And Curettage    OTHER SURGICAL HISTORY  2013    Cervix Cryosurgery    OTHER SURGICAL HISTORY  2013     Hysteroscopy    OTHER SURGICAL HISTORY  10/12/2021    Ulnar collateral ligament of elbow reconstruction    OTHER SURGICAL HISTORY  10/12/2021    Wrist arthroscopy    OTHER SURGICAL HISTORY  10/12/2021    Ulnar collateral ligament of thumb rupture repair    OTHER SURGICAL HISTORY  10/12/2021    Carpal tunnel surgery    OTHER SURGICAL HISTORY  10/12/2021    Synovectomy    OTHER SURGICAL HISTORY  10/12/2021    Arthroplasty    OTHER SURGICAL HISTORY  10/12/2021    Metacarpophalangeal joint arthroplasty    SINUS SURGERY  2013    Sinus Surgery     Family History   Problem Relation Name Age of Onset    Atrial fibrillation Mother           age 91    Hypertension Mother      Dementia Mother      Other (pacemaker) Father           age 82    Heart attack Father  82    Other (arthritis hip) Brother      Diabetes type II Paternal Grandmother      Breast cancer Cousin       Social History     Tobacco Use    Smoking status: Former     Current packs/day: 0.00     Average packs/day: 0.3 packs/day for 0.7 years (0.2 ttl pk-yrs)     Types: Cigarettes     Start date: 1975     Quit date: 1976     Years since quittin.2    Smokeless tobacco: Never   Vaping Use    Vaping status: Never Used   Substance Use Topics    Alcohol use: Not Currently     Alcohol/week: 6.0 standard drinks of alcohol     Types: 6 Glasses of wine per week    Drug use: Never       Physical Exam   ED Triage Vitals [24 0904]   Temperature Heart Rate Respirations BP   37 °C (98.6 °F) 69 16 114/80      Pulse Ox Temp Source Heart Rate Source Patient Position   95 % Temporal Monitor Sitting      BP Location FiO2 (%)     Right arm --       Physical Exam  Constitutional:       Appearance: Normal appearance. She is normal weight.   HENT:      Head: Normocephalic.   Eyes:      Extraocular Movements: Extraocular movements intact.      Conjunctiva/sclera: Conjunctivae normal.   Cardiovascular:      Rate and Rhythm: Normal rate and regular rhythm.       Heart sounds: Normal heart sounds.   Pulmonary:      Effort: Pulmonary effort is normal.      Breath sounds: Normal breath sounds.   Abdominal:      General: Abdomen is flat.      Palpations: Abdomen is soft.      Tenderness: There is no abdominal tenderness. There is left CVA tenderness.   Musculoskeletal:      Cervical back: Neck supple.   Neurological:      Mental Status: She is alert and oriented to person, place, and time.   Psychiatric:         Behavior: Behavior normal.       ED Course & MDM        Medical Decision Making  UA: SG-1.020 pH-7.0.  LE: Trace.  Negative Nitrites, glucose, blood, protein.    CBC: normal.  CMP Normal except for glucose of 110    CT A/P without Contrast:  No acute abdominal or pelvic pathology. No renal obstruction. Fecalith in an otherwise normal appendix. Constipation. A  4.6 x 2.7 x 1.9 cm cystic area right ovary.     Given the suspicious urine, will treat for UTI.  Patient is advised to work diligently on her constipation over the next week by taking MiraLAX daily.  She is also advised to check with her GYN regarding the ovarian cyst, as they may want the ultrasound prior to her hysterectomy scheduled in May. Or they may want to biopsy the ovary with the hysterectomy.     A prescription for Bactrim DS for UTI has been sent to pt's pharmacy     Amount and/or Complexity of Data Reviewed  External Data Reviewed: labs and notes.  Labs: ordered. Decision-making details documented in ED Course.  Radiology: ordered. Decision-making details documented in ED Course.        Procedure  Procedures     Jarod Aranda MD  04/26/24 1433

## 2024-04-26 NOTE — DISCHARGE INSTRUCTIONS
You were seen today for a 5-day history of left flank pain radiating to the left lower quadrant.  Symptoms were accompanied by nausea.    Your urinalysis showed an elevated pH-7.0 and findings suspicous for a UTI.   Your CBC was normal.  Your glucose of 110    CT A/P without Contrast:  No acute abdominal or pelvic pathology. No renal obstruction. Fecalith in an otherwise normal appendix. Constipation.   A  4.6 x 2.7 x 1.9 cm cystic area right ovary.     Given the suspicious urine, will treat for UTI.  You are advised to work diligently on your constipation over the next week by taking MiraLAX daily.  You are also advised to check with your GYN regarding the ovarian cyst, as they may want the ultrasound prior to her hysterectomy scheduled in May. Or they may want to biopsy the ovary with the hysterectomy.     A prescription for Bactrim DS for UTI has been sent to your pharmacy.    Thank you for choosing [facility].

## 2024-04-30 ENCOUNTER — APPOINTMENT (OUTPATIENT)
Dept: OBSTETRICS AND GYNECOLOGY | Facility: CLINIC | Age: 69
End: 2024-04-30
Payer: MEDICARE

## 2024-05-02 ENCOUNTER — APPOINTMENT (OUTPATIENT)
Dept: OBSTETRICS AND GYNECOLOGY | Facility: CLINIC | Age: 69
End: 2024-05-02
Payer: MEDICARE

## 2024-05-03 ENCOUNTER — TELEMEDICINE CLINICAL SUPPORT (OUTPATIENT)
Dept: PREADMISSION TESTING | Facility: HOSPITAL | Age: 69
End: 2024-05-03
Payer: MEDICARE

## 2024-05-03 DIAGNOSIS — Z01.818 PRE-OP TESTING: ICD-10-CM

## 2024-05-03 RX ORDER — BETAMETHASONE DIPROPIONATE 0.5 MG/G
OINTMENT, AUGMENTED TOPICAL
COMMUNITY
Start: 2024-01-18

## 2024-05-03 RX ORDER — CLOBETASOL PROPIONATE 0.46 MG/ML
SOLUTION TOPICAL
COMMUNITY
Start: 2024-01-18

## 2024-05-07 ENCOUNTER — PROCEDURE VISIT (OUTPATIENT)
Dept: OBSTETRICS AND GYNECOLOGY | Facility: CLINIC | Age: 69
End: 2024-05-07
Payer: MEDICARE

## 2024-05-07 DIAGNOSIS — N39.41 URGE INCONTINENCE: ICD-10-CM

## 2024-05-07 DIAGNOSIS — N32.81 OVERACTIVE BLADDER: ICD-10-CM

## 2024-05-07 PROCEDURE — 51729 CYSTOMETROGRAM W/VP&UP: CPT | Performed by: OBSTETRICS & GYNECOLOGY

## 2024-05-07 PROCEDURE — 51797 INTRAABDOMINAL PRESSURE TEST: CPT | Performed by: OBSTETRICS & GYNECOLOGY

## 2024-05-07 PROCEDURE — 51798 US URINE CAPACITY MEASURE: CPT | Performed by: OBSTETRICS & GYNECOLOGY

## 2024-05-07 PROCEDURE — 51741 ELECTRO-UROFLOWMETRY FIRST: CPT | Performed by: OBSTETRICS & GYNECOLOGY

## 2024-05-07 PROCEDURE — 51784 ANAL/URINARY MUSCLE STUDY: CPT | Performed by: OBSTETRICS & GYNECOLOGY

## 2024-05-10 ENCOUNTER — LAB (OUTPATIENT)
Dept: LAB | Facility: LAB | Age: 69
End: 2024-05-10
Payer: MEDICARE

## 2024-05-10 ENCOUNTER — PRE-ADMISSION TESTING (OUTPATIENT)
Dept: PREADMISSION TESTING | Facility: HOSPITAL | Age: 69
End: 2024-05-10
Payer: MEDICARE

## 2024-05-10 VITALS
SYSTOLIC BLOOD PRESSURE: 117 MMHG | RESPIRATION RATE: 18 BRPM | BODY MASS INDEX: 27.1 KG/M2 | HEIGHT: 61 IN | DIASTOLIC BLOOD PRESSURE: 56 MMHG | WEIGHT: 143.52 LBS | TEMPERATURE: 97.2 F | OXYGEN SATURATION: 96 % | HEART RATE: 79 BPM

## 2024-05-10 DIAGNOSIS — Z01.818 PRE-OP TESTING: ICD-10-CM

## 2024-05-10 DIAGNOSIS — Z01.818 PRE-OP TESTING: Primary | ICD-10-CM

## 2024-05-10 LAB
ABO GROUP (TYPE) IN BLOOD: NORMAL
ANION GAP SERPL CALC-SCNC: 11 MMOL/L (ref 10–20)
ANTIBODY SCREEN: NORMAL
BUN SERPL-MCNC: 18 MG/DL (ref 6–23)
CALCIUM SERPL-MCNC: 8.7 MG/DL (ref 8.6–10.3)
CHLORIDE SERPL-SCNC: 103 MMOL/L (ref 98–107)
CO2 SERPL-SCNC: 31 MMOL/L (ref 21–32)
CREAT SERPL-MCNC: 0.71 MG/DL (ref 0.5–1.05)
EGFRCR SERPLBLD CKD-EPI 2021: >90 ML/MIN/1.73M*2
ERYTHROCYTE [DISTWIDTH] IN BLOOD BY AUTOMATED COUNT: 12.6 % (ref 11.5–14.5)
GLUCOSE SERPL-MCNC: 77 MG/DL (ref 74–99)
HCT VFR BLD AUTO: 39.7 % (ref 36–46)
HGB BLD-MCNC: 13.4 G/DL (ref 12–16)
MCH RBC QN AUTO: 31.5 PG (ref 26–34)
MCHC RBC AUTO-ENTMCNC: 33.8 G/DL (ref 32–36)
MCV RBC AUTO: 93 FL (ref 80–100)
NRBC BLD-RTO: 0 /100 WBCS (ref 0–0)
PLATELET # BLD AUTO: 265 X10*3/UL (ref 150–450)
POTASSIUM SERPL-SCNC: 4.7 MMOL/L (ref 3.5–5.3)
RBC # BLD AUTO: 4.25 X10*6/UL (ref 4–5.2)
RH FACTOR (ANTIGEN D): NORMAL
SODIUM SERPL-SCNC: 140 MMOL/L (ref 136–145)
WBC # BLD AUTO: 5.3 X10*3/UL (ref 4.4–11.3)

## 2024-05-10 PROCEDURE — 86901 BLOOD TYPING SEROLOGIC RH(D): CPT

## 2024-05-10 PROCEDURE — 86850 RBC ANTIBODY SCREEN: CPT

## 2024-05-10 PROCEDURE — 99204 OFFICE O/P NEW MOD 45 MIN: CPT | Performed by: NURSE PRACTITIONER

## 2024-05-10 PROCEDURE — 86900 BLOOD TYPING SEROLOGIC ABO: CPT

## 2024-05-10 PROCEDURE — 80048 BASIC METABOLIC PNL TOTAL CA: CPT

## 2024-05-10 PROCEDURE — 85027 COMPLETE CBC AUTOMATED: CPT

## 2024-05-10 PROCEDURE — 93005 ELECTROCARDIOGRAM TRACING: CPT | Performed by: NURSE PRACTITIONER

## 2024-05-10 PROCEDURE — 87081 CULTURE SCREEN ONLY: CPT | Mod: AHULAB | Performed by: NURSE PRACTITIONER

## 2024-05-10 PROCEDURE — 36415 COLL VENOUS BLD VENIPUNCTURE: CPT

## 2024-05-10 RX ORDER — CHLORHEXIDINE GLUCONATE ORAL RINSE 1.2 MG/ML
SOLUTION DENTAL
Qty: 475 ML | Refills: 0 | Status: SHIPPED | OUTPATIENT
Start: 2024-05-10

## 2024-05-10 ASSESSMENT — ENCOUNTER SYMPTOMS
NECK NEGATIVE: 1
ARTHRALGIAS: 1
CONSTITUTIONAL NEGATIVE: 1
DYSPNEA AT REST: 0
DIARRHEA: 0
CONSTIPATION: 0
ABDOMINAL PAIN: 0
PALPITATIONS: 0
MYALGIAS: 0
RESPIRATORY NEGATIVE: 1
NEUROLOGICAL NEGATIVE: 1
BRUISES/BLEEDS EASILY: 0

## 2024-05-10 NOTE — PREPROCEDURE INSTRUCTIONS
Medication List            Accurate as of May 10, 2024  8:24 AM. Always use your most recent med list.                b complex vitamins capsule  Medication Adjustments for Surgery: Stop 7 days before surgery     betamethasone (augmented) 0.05 % ointment  Commonly known as: Diprolene  Medication Adjustments for Surgery: Continue until night before surgery     CALCIUM 26-VIT D3-MAGNESIUM 15 ORAL  Medication Adjustments for Surgery: Stop 1 day before surgery     chlorhexidine 0.12 % solution  Commonly known as: Peridex  Swish for 30 seconds and spit 15mL of solution the night before and morning of surgery     cholecalciferol 125 MCG (5000 UT) capsule  Commonly known as: Vitamin D-3  Medication Adjustments for Surgery: Stop 1 day before surgery     clobetasol 0.05 % external solution  Commonly known as: Temovate  Medication Adjustments for Surgery: Continue until night before surgery     cyanocobalamin 1,000 mcg tablet  Commonly known as: Vitamin B-12  Medication Adjustments for Surgery: Stop 1 day before surgery     estradiol 0.01 % (0.1 mg/gram) vaginal cream  Commonly known as: Estrace  Medication Adjustments for Surgery: Continue until night before surgery     ibuprofen 600 mg tablet  Take 1 tablet (600 mg) by mouth every 8 hours if needed for moderate pain (4 - 6).  Medication Adjustments for Surgery: Stop 7 days before surgery     omega 3-dha-epa-fish oil 1,000 mg (120 mg-180 mg) capsule  Medication Adjustments for Surgery: Stop 7 days before surgery     omeprazole 20 mg DR capsule  Commonly known as: PriLOSEC  TAKE 1 CAPSULE Daily 30 minutes before a meal if taking ibuprofen  Medication Adjustments for Surgery: Take morning of surgery with sip of water, no other fluids     turmeric root extract 500 mg capsule  Medication Adjustments for Surgery: Stop 7 days before surgery     ZINC PICOLINATE (BULK) MISC  Medication Adjustments for Surgery: Stop 7 days before surgery                      **Concerning above  medication instructions, if medication is normally taken at night, continue normal schedule.**  **DO NOT TAKE NIGHT PRIOR AND MORNING OF SURGERY**    CONTACT SURGEON'S OFFICE IF YOU DEVELOP:  * Fever = 100.4 F   * New respiratory symptoms (e.g. cough, shortness of breath, respiratory distress, sore throat)  * Recent loss of taste or smell  *Flu like symptoms such as headache, fatigue or gastrointestinal symptoms  * You develop any open sores, shingles, burning or painful urination   AND/OR:  * You no longer wish to have the surgery.  * Any other personal circumstances change that may lead to the need to cancel or defer this surgery.  *You were admitted to any hospital within one week of your planned procedure.    SMOKING:  *Quitting smoking can make a huge difference to your health and recovery from surgery.    *If you need help with quitting, call 0-006-QUIT-NOW.    THE DAY BEFORE SURGERY:  *Do not eat any food after midnight the night before surgery.   *You are permitted to drink clear liquids (i.e. water, black coffee (no milk or cream), tea, apple juice and electrolyte drinks (gatorade)) up to 13.5 ounces, up to 2 hours before your arrival time.  *You may chew gum until 2 hours before your surgery    SURGICAL TIME  *You will be contacted between 2 p.m. and 6 p.m. the business day before your surgery with your arrival time.  *If you haven't received a call by 6pm, call [phone].  *Scheduled surgery times may change and you will be notified if this occurs-check your personal voicemail for any updates.    ON THE MORNING OF SURGERY:  *Wear comfortable, loose fitting clothing.   *Do not use moisturizers, creams, lotions or perfume.  *All jewelry and valuables should be left at home.  *Prosthetic devices such as contact lenses, hearing aids, dentures, eyelash extensions, hairpins and body piercing must be removed before surgery.    BRING WITH YOU:  *Photo ID and insurance card  *Current list of medicines and  allergies  *Pacemaker/Defibrillator/Heart stent cards  *CPAP machine and mask  *Slings/splints/crutches  *Copy of your complete Advanced Directive/DHPOA-if applicable  *Neurostimulator implant remote    PARKING AND ARRIVAL:  *Check in at the Main Entrance desk and let them know you are here for surgery.  *You will be directed to the 2nd floor surgical waiting area.    AFTER OUTPATIENT SURGERY:  *A responsible adult MUST accompany you at the time of discharge and stay with you for 24 hours after your surgery.  *You may NOT drive yourself home after surgery.  *You may use a taxi or ride sharing service ([employer], Uber) to return home ONLY if you are accompanied by a friend or family member.  *Instructions for resuming your medications will be provided by your surgeon.

## 2024-05-10 NOTE — CPM/PAT H&P
[facility] Evaluation       Name: Wendy Savage (Wendy Savage)  /Age: 1955/69 y.o.         Date of Consult: 05/10/24     Referring Provider: Dr. Zeynep Campbell    Surgery, Date, and Length: Laparoscopic Total Hysterectomy; Bilateral Salpingectomy-Oophorectomy - Bilateral  Laparoscopic Colpopexy  Anterior & Posterior Repairs; Possible Perineorrhaphy  Mid Urethral Sling Insertion  Cystoscopy   24  240 min      Wendy Savage is a  69 year-old female who presents to the [facility] for perioperative risk assessment prior to surgery.    Patient presents with a primary diagnosis of  cystocele with uterine prolapse, OAB, and urge incontinence.   Started with ski season last year but much worse this year.  - Reports she is bothered by the bulge    - She is sexually active   - Most bothered by the bulge during alexander season and working out      This note was created in part upon personal review of patient's medical records.      Patient is scheduled to have a Laparoscopic Total Hysterectomy; Bilateral Salpingectomy-Oophorectomy - Bilateral  Laparoscopic Colpopexy  Anterior & Posterior Repairs; Possible Perineorrhaphy  Mid Urethral Sling Insertion  Cystoscopy       Pt denies any past history of anesthetic complications such as PONV, awareness, prolonged sedation, dental damage, aspiration, cardiac arrest, difficult intubation, difficult I.V. access or unexpected hospital admissions.  NO malignant hyperthermia and or pseudocholinesterase deficiency.  No history of blood transfusions     The patient is not a Yazdanism and will accept blood and blood products if medically indicated.   Type and screen sent.     Past Medical History:   Diagnosis Date    Anxiety disorder, unspecified     pt denies    Aortic valve regurgitation     mild on ECHO  and mild on repeat ECHO 2023    B12 deficiency     Chest pain      - stress test no ischemia    Closed fracture of pelvis (Multi) 2016    Cystocele with  second degree uterine prolapse     GERD (gastroesophageal reflux disease)     Hepatic cyst     incidentalally found on CT calcium score 2019, gallbladder U/S :LIVER:  The right hepatic lobe measures 17.0 cm in length. Normal  echogenicity and contour.  Multiple cysts are present, measuring up  to 1.6 cm in the left hepatic lobe and 1.1 cm in the right hepatic  lobe.    Hiatal hernia     Hyperlipidemia     Osteopenia     Osteoporosis     Prediabetes     12.1.23: A1C 5.9%    Psoriasis     Vitamin D deficiency     Vitamin D deficiency, unspecified        Past Surgical History:   Procedure Laterality Date     SECTION, CLASSIC  2013     Section    COLONOSCOPY  2013    Colonoscopy (Fiberoptic)    DILATION AND CURETTAGE OF UTERUS  2013    Dilation And Curettage    HYSTEROSCOPY      OTHER SURGICAL HISTORY  2013    Cervix Cryosurgery    OTHER SURGICAL HISTORY  10/12/2021    Ulnar collateral ligament of elbow reconstruction    OTHER SURGICAL HISTORY  10/12/2021    Metacarpophalangeal joint arthroplasty    OTHER SURGICAL HISTORY      urthethra carbuncle excision    ROTATOR CUFF REPAIR Right     SINUS SURGERY  2013    Sinus Surgery    SYNOVECTOMY      WRIST SURGERY Right 10/12/2021    Wrist arthroscopy/CTR/Ulnar collateral ligament of thumb rupture repair         Family History   Problem Relation Name Age of Onset    Atrial fibrillation Mother           age 91    Hypertension Mother      Dementia Mother      Other (pacemaker) Father           age 82    Heart attack Father  82    Other (arthritis hip) Brother      No Known Problems Brother      Diabetes type II Paternal Grandmother      Breast cancer Cousin       Social History     Socioeconomic History    Marital status:      Spouse name: Not on file    Number of children: Not on file    Years of education: Not on file    Highest education level: Not on file   Occupational History    Not on file   Tobacco Use     Smoking status: Never    Smokeless tobacco: Never   Vaping Use    Vaping status: Never Used   Substance and Sexual Activity    Alcohol use: Not Currently     Alcohol/week: 6.0 standard drinks of alcohol     Types: 6 Glasses of wine per week    Drug use: Never    Sexual activity: Not on file   Other Topics Concern    Not on file   Social History Narrative    Not on file     Social Determinants of Health     Financial Resource Strain: Low Risk  (7/31/2023)    Overall Financial Resource Strain (CARDIA)     Difficulty of Paying Living Expenses: Not hard at all   Food Insecurity: No Food Insecurity (7/31/2023)    Hunger Vital Sign     Worried About Running Out of Food in the Last Year: Never true     Ran Out of Food in the Last Year: Never true   Transportation Needs: No Transportation Needs (7/31/2023)    PRAPARE - Transportation     Lack of Transportation (Medical): No     Lack of Transportation (Non-Medical): No   Physical Activity: Sufficiently Active (7/31/2023)    Exercise Vital Sign     Days of Exercise per Week: 6 days     Minutes of Exercise per Session: 60 min   Stress: No Stress Concern Present (7/31/2023)    [employer] - Occupational Stress Questionnaire     Feeling of Stress : Not at all   Social Connections: Socially Integrated (7/31/2023)    Social Connection and Isolation Panel [NHANES]     Frequency of Communication with Friends and Family: More than three times a week     Frequency of Social Gatherings with Friends and Family: Twice a week     Attends Nondenominational Services: 1 to 4 times per year     Active Member of Clubs or Organizations: Yes     Attends Club or Organization Meetings: 1 to 4 times per year     Marital Status:    Intimate Partner Violence: Not At Risk (7/31/2023)    Humiliation, Afraid, Rape, and Kick questionnaire     Fear of Current or Ex-Partner: No     Emotionally Abused: No     Physically Abused: No     Sexually Abused: No   Housing Stability: Low  Risk  (7/31/2023)    Housing Stability Vital Sign     Unable to Pay for Housing in the Last Year: No     Number of Places Lived in the Last Year: 1     Unstable Housing in the Last Year: No          No Known Allergies      Current Outpatient Medications:     b complex vitamins capsule, Take 1 capsule by mouth once daily., Disp: , Rfl:     betamethasone, augmented, (Diprolene) 0.05 % ointment, Apply to the affected areas of the body twice a day for 3 weeks, then take 1 week off. Repeat as needed for flares., Disp: , Rfl:     CALCIUM 26-VIT D3-MAGNESIUM 15 ORAL, Take 1 capsule by mouth once daily., Disp: , Rfl:     cholecalciferol (Vitamin D-3) 125 MCG (5000 UT) capsule, Take by mouth., Disp: , Rfl:     clobetasol (Temovate) 0.05 % external solution, Apply to the affected areas of the scalp every night for 3 weeks, then take 1 week off. Repeat as needed for flares., Disp: , Rfl:     cyanocobalamin (Vitamin B-12) 1,000 mcg tablet, Take 1 tablet (1,000 mcg) by mouth once daily., Disp: , Rfl:     estradiol (Estrace) 0.1 MG/GM vaginal cream, Insert into the vagina., Disp: , Rfl:     ibuprofen 600 mg tablet, Take 1 tablet (600 mg) by mouth every 8 hours if needed for moderate pain (4 - 6)., Disp: 90 tablet, Rfl: 1    omega 3-dha-epa-fish oil 1,000 mg (120 mg-180 mg) capsule, Take by mouth., Disp: , Rfl:     omeprazole (PriLOSEC) 20 mg DR capsule, TAKE 1 CAPSULE Daily 30 minutes before a meal if taking ibuprofen (Patient taking differently: Take 1 capsule (20 mg) by mouth if needed. TAKE 1 CAPSULE Daily 30 minutes before a meal if taking ibuprofen), Disp: 90 capsule, Rfl: 0    turmeric root extract 500 mg capsule, Take 1 capsule by mouth once daily., Disp: , Rfl:     ZINC PICOLINATE, BULK, MISC, Take 50 mg by mouth if needed., Disp: , Rfl:     chlorhexidine (Peridex) 0.12 % solution, Swish for 30 seconds and spit 15mL of solution the night before and morning of surgery, Disp: 475 mL, Rfl: 0      Visit Vitals  /56  "  Pulse 79   Temp 36.2 °C (97.2 °F)   Resp 18   Ht 1.549 m (5' 1\")   Wt 65.1 kg (143 lb 8.3 oz)   SpO2 96%   BMI 27.12 kg/m²   OB Status Postmenopausal   Smoking Status Never   BSA 1.67 m²        PAT ROS:   Constitutional:   neg    Neuro/Psych:   neg    Eyes:    no corrective lenses  Ears:    no hearing aides  Nose:   Mouth:   neg    Throat:   neg    Neck:   neg    Cardio:    Spinning, strength training, downhill skiing. Functional 4 Mets. Patient denies SOB walking up 2 flights of stairs    no chest pain   no palpitations   no dyspnea  Respiratory:   neg    Endocrine:   GI:    no abdominal pain   no constipation   no diarrhea  :   neg    Musculoskeletal:    arthralgias (in bilateral hands/back pain)   no myalgias  Hematologic:    does not bruise/bleed easily   no blood clots  Skin:  neg        Physical Exam  Constitutional:       Appearance: She is normal weight.   HENT:      Head: Normocephalic and atraumatic.      Mouth/Throat:      Mouth: Mucous membranes are moist.      Pharynx: Oropharynx is clear.   Eyes:      Conjunctiva/sclera: Conjunctivae normal.   Cardiovascular:      Rate and Rhythm: Normal rate and regular rhythm.      Pulses: Normal pulses.      Heart sounds: Normal heart sounds.   Pulmonary:      Effort: Pulmonary effort is normal.      Breath sounds: Normal breath sounds.   Abdominal:      General: Bowel sounds are normal.      Palpations: Abdomen is soft.   Musculoskeletal:         General: Normal range of motion.      Cervical back: Normal range of motion.   Skin:     General: Skin is warm and dry.      Capillary Refill: Capillary refill takes less than 2 seconds.   Neurological:      General: No focal deficit present.      Mental Status: She is alert and oriented to person, place, and time.          PAT AIRWAY:   Airway:     Mallampati::  II    Neck ROM::  Full   No broken teeth, no dentures and no missing teeth           Plan    Neuro:    Cardiovascular:  Patient denies any chest pain, " tightness, heaviness, pressure, radiating pain, palpitations, irregular heartbeats, lightheadedness, cough, congestion, shortness of breath, IGNACIO, PND, near syncope, weight loss or gain.     Aortic valve regurgitation  Echocardiogram  06/19/23  CONCLUSIONS:  1. Left ventricular systolic function is normal with a 60-65% estimated ejection fraction.  2. Spectral Doppler shows an impaired relaxation pattern of left ventricular diastolic filling.  3. Mild aortic valve regurgitation.  4. RVSP within normal limits.  HLD: Fish Oil- patient to hold for 7 days prior to surgery    EKG in [facility] on 05/10/24   Normal Sinus Rhythm Low voltage QRS  L anterior fasicular block  Possible lateral infarct, age undetermined  Abnormal ECG    05/22/23 EKG  NSR.   Left axis deviation.   Slow r wave progression anteriorly (not new)   .   Non specific T wave findings, not new.   No acute ischemia.     Exercise Stress Test  08/18/2021  Summary:   1. Negative stress EKG for ischemia.   2. Excellent functional capacity for age.   3. No exercise associated cardiac symptoms were noted.   4. Appropriate blood pressure response with exercise.    RCRI:  0 Risk of Mace: 3.9%      Pulm:  Denies any shortness of breath or activity intolerance    Stop Bang= 1 (age)    Heme:  Patient instructed to ambulate as soon as possible postoperatively to decrease thromboembolic risk.    Initiate mechanical DVT prophylaxis as soon as possible and initiate chemical prophylaxis when deemed safe from a bleeding standpoint post surgery.    Caprini=5    Risk assessment complete.  Patient is scheduled for an intermediate  surgical risk procedure.       Labs/testing obtained in [facility] on 05/10/24     Lab Results   Component Value Date    WBC 5.3 05/10/2024    HGB 13.4 05/10/2024    HCT 39.7 05/10/2024    MCV 93 05/10/2024     05/10/2024      Lab Results   Component Value Date    GLUCOSE 77 05/10/2024    CALCIUM 8.7 05/10/2024     05/10/2024    K 4.7 05/10/2024     CO2 31 05/10/2024     05/10/2024    BUN 18 05/10/2024    CREATININE 0.71 05/10/2024      Lab Results   Component Value Date    ALT 13 04/26/2024    AST 13 04/26/2024    ALKPHOS 64 04/26/2024    BILITOT 0.4 04/26/2024      Microscopic Only, Urine  Order: [id number] - Reflex for Order [id number]   Status: Final result       Visible to patient: Yes (not seen)    0 Result Notes      Component  Ref Range & Units 2 wk ago   WBC, Urine  1-5, NONE /HPF 1-5   RBC, Urine  NONE, 1-2, 3-5 /HPF 1-2   Resulting [facility]              Specimen Collected: 04/26/24 09:08 Last Resulted: 04/26/24 09:51        Lab Flowsheet        Order Details        View Encounter        Lab and Collection Details        Routing        Result History     View All Conversations on this Encounter           Result Care Coordination      Patient Communication     Released  Not seen Back to Top          Contains abnormal data Urinalysis with Reflex Microscopic  Order: [id number]   Status: Final result         Visible to patient: Yes (not seen)      0 Result Notes           Component  Ref Range & Units 2 wk ago 2 yr ago   Color, Urine  Straw, Yellow Yellow YELLOW R   Appearance, Urine  Clear Clear CLEAR R   Specific Gravity, Urine  1.005 - 1.035 1.020 1.011   pH, Urine  5.0, 5.5, 6.0, 6.5, 7.0, 7.5, 8.0 7.0 7.0 R   Protein, Urine  NEGATIVE, TRACE mg/dL NEGATIVE NEGATIVE R   Glucose, Urine  NEGATIVE mg/dL NEGATIVE NEGATIVE   Blood, Urine  NEGATIVE NEGATIVE NEGATIVE   Ketones, Urine  NEGATIVE mg/dL NEGATIVE NEGATIVE   Bilirubin, Urine  NEGATIVE NEGATIVE NEGATIVE   Urobilinogen, Urine  0.2, 1.0 mg/dL 0.2 <2.0 R   Nitrite, Urine  NEGATIVE NEGATIVE NEGATIVE   Leukocyte Esterase, Urine  NEGATIVE TRACE Abnormal  NEGATIVE   Resulting [facility]              Specimen Collected: 04/26/24 09:08 Last Resulted: 04/26/24 09:51           Labs reviewed and unremarkable.     Follow up: MRSA      Preoperative medication instructions were provided and reviewed  with the patient.  Any additional testing or evaluation was explained to the patient.  Nothing by mouth instructions were discussed and patient's questions were answered prior to conclusion to this encounter.  Patient verbalized understanding of preoperative instructions given in preadmission testing; discharge instructions available in EMR.    This note was dictated with speech recognition.  Minor errors may have been detected during use of speech recognition.

## 2024-05-12 LAB — STAPHYLOCOCCUS SPEC CULT: NORMAL

## 2024-05-13 NOTE — PROGRESS NOTES
Patient ID: Wendy Savage is a 69 y.o. female.    Uroflowmetry    Date/Time: 5/7/2024 8:53 AM    Performed by: Wendy Grace MA  Authorized by: Zeynep Campbell MD    Procedure discussed: discussed risks, benefits and alternatives    Chaperone present: no    Timeout: timeout called immediately prior to procedure    Prep: patient was prepped and draped in usual sterile fashion    Position: sitting    Procedure Details     Procedure: CMG with UPP/voiding pressures, EMG, intra-abdominal voiding study and uroflow      CMG with UPP/Voiding Pressures Details:     First urge to void (mL): 61    Urgency to void (mL): 238    Bladder capacity (mL): 339    Intra-abdominal Voiding Study Details:     Rectal catheter placed to record intra-abdominal pressure: yes      Uroflow Details:     Pre-void volume (mL): 178.5    Voiding duration (sec): 16.2    Average flow rate (mL/sec): 11.5    Max flow rate (mL/sec): 22    Voided urine (mL): 372    Residual urine (mL): 0    Post-Procedure Details     Outcome: patient tolerated procedure well with no complications      Additional Details      NO stress noted.     Prolapse reduced. No ALEX or DO. Normal MUCPs. Normal voiding.  Zeynep Campbell MD

## 2024-05-15 LAB
ATRIAL RATE: 73 BPM
P AXIS: 58 DEGREES
P OFFSET: 204 MS
P ONSET: 149 MS
PR INTERVAL: 130 MS
Q ONSET: 214 MS
QRS COUNT: 12 BEATS
QRS DURATION: 82 MS
QT INTERVAL: 382 MS
QTC CALCULATION(BAZETT): 420 MS
QTC FREDERICIA: 408 MS
R AXIS: -49 DEGREES
T AXIS: 13 DEGREES
T OFFSET: 405 MS
VENTRICULAR RATE: 73 BPM

## 2024-05-16 ENCOUNTER — TELEMEDICINE (OUTPATIENT)
Dept: OBSTETRICS AND GYNECOLOGY | Facility: CLINIC | Age: 69
End: 2024-05-16
Payer: MEDICARE

## 2024-05-16 DIAGNOSIS — N81.4 CYSTOCELE WITH UTERINE PROLAPSE: Primary | ICD-10-CM

## 2024-05-16 PROCEDURE — 1159F MED LIST DOCD IN RCRD: CPT | Performed by: OBSTETRICS & GYNECOLOGY

## 2024-05-16 PROCEDURE — 99212 OFFICE O/P EST SF 10 MIN: CPT | Performed by: OBSTETRICS & GYNECOLOGY

## 2024-05-16 NOTE — PROGRESS NOTES
Urogynecology  Provider:  Zeynep Campbell MD  [phone]              ASSESSMENT AND PLAN:   69 year old female pre-surgery for TLH, BSO, USLS, anterior & posterior repairs, possible perineorrhapy, cystoscopy, and MUS on 5/24/24.    Pre-Operative Discussion  - The patient is prepared for upcoming procedures with a clear understanding of the surgery and its potential risks and complications.  - She has undergone necessary pre-operative evaluations, including UDS, which revealed no ALEX.   - Proceed with surgery s scheduled, with a post-operative plan for the patient to likely stay overnight, keeping in mind there's a small possibility of her choosing to go home the same day.  - All pre-operative questions and concerns have been addressed.    There were some questions about whether she has a cardiologist based on her EKG. I reached out to Dr. Fernández her PCP and she researched and these her EKG is stable and she is aware. She is fine to proceed with surgery.     Return for surgery on 5/24/2024 with Dr. Campbell.    Scribe Attestation  By signing my name below, I, Cassidy Sharma, attest that this documentation has been prepared under the direction and in the presence of Zeynep Campbell MD on 05/16/2024 at 2:35 PM.      Problem List Items Addressed This Visit    None          I spent a total of 15 minutes in face to face and non face to face time at this virtual visit.          Zeynep Campbell MD        HISTORY OF PRESENT ILLNESS:     For OR 5/24/24    UDN: NO ALEX    Procedure Laterality Anesthesia   Laparoscopic Total Hysterectomy; Bilateral Salpingectomy-Oophorectomy [61176 (CPT®) +1 more] Bilateral General   Laparoscopic Colpopexy [51839 (CPT®)] N/A General   Anterior & Posterior Repairs; Possible Perineorrhaphy [41354 (CPT®)] N/A General   Mid Urethral Sling Insertion [70461 (CPT®)] N/A General   Cystoscopy [71255 (CPT®)] N/A         Record Review:     Prolapse Symptoms :     Urinary Symptoms:     Bowel  Symptoms:     Sexual Activity:          Past Medical History:       Past Medical History:   Diagnosis Date    Anxiety disorder, unspecified     pt denies    Aortic valve regurgitation     mild on ECHO  and mild on repeat ECHO 2023    B12 deficiency     Chest pain      - stress test no ischemia    Closed fracture of pelvis (Multi) 2016    Cystocele with second degree uterine prolapse     GERD (gastroesophageal reflux disease)     Hepatic cyst     incidentalally found on CT calcium score 2019, gallbladder U/S :LIVER:  The right hepatic lobe measures 17.0 cm in length. Normal  echogenicity and contour.  Multiple cysts are present, measuring up  to 1.6 cm in the left hepatic lobe and 1.1 cm in the right hepatic  lobe.    Hiatal hernia     Hyperlipidemia     Osteopenia     Osteoporosis     Prediabetes     12.23: A1C 5.9%    Psoriasis     Vitamin D deficiency     Vitamin D deficiency, unspecified           Past Surgical History:       Past Surgical History:   Procedure Laterality Date     SECTION, CLASSIC  2013     Section    COLONOSCOPY  2013    Colonoscopy (Fiberoptic)    DILATION AND CURETTAGE OF UTERUS  2013    Dilation And Curettage    HYSTEROSCOPY      OTHER SURGICAL HISTORY  2013    Cervix Cryosurgery    OTHER SURGICAL HISTORY  10/12/2021    Ulnar collateral ligament of elbow reconstruction    OTHER SURGICAL HISTORY  10/12/2021    Metacarpophalangeal joint arthroplasty    OTHER SURGICAL HISTORY      urthethra carbuncle excision    ROTATOR CUFF REPAIR Right     SINUS SURGERY  2013    Sinus Surgery    SYNOVECTOMY      WRIST SURGERY Right 10/12/2021    Wrist arthroscopy/CTR/Ulnar collateral ligament of thumb rupture repair         Medications:       Prior to Admission medications    Medication Sig Start Date End Date Taking? Authorizing Provider   b complex vitamins capsule Take 1 capsule by mouth once daily.    Historical Provider, MD    betamethasone, augmented, (Diprolene) 0.05 % ointment Apply to the affected areas of the body twice a day for 3 weeks, then take 1 week off. Repeat as needed for flares. 1/18/24   Historical Provider, MD   CALCIUM 26-VIT D3-MAGNESIUM 15 ORAL Take 1 capsule by mouth once daily. 7/21/21   Historical Provider, MD   chlorhexidine (Peridex) 0.12 % solution Swish for 30 seconds and spit 15mL of solution the night before and morning of surgery 5/10/24   Bridget Seymour APRN-CNP   cholecalciferol (Vitamin D-3) 125 MCG (5000 UT) capsule Take by mouth. 4/19/17   Historical Provider, MD   clobetasol (Temovate) 0.05 % external solution Apply to the affected areas of the scalp every night for 3 weeks, then take 1 week off. Repeat as needed for flares. 1/18/24   Historical Provider, MD   cyanocobalamin (Vitamin B-12) 1,000 mcg tablet Take 1 tablet (1,000 mcg) by mouth once daily.    Historical Provider, MD   estradiol (Estrace) 0.1 MG/GM vaginal cream Insert into the vagina. 3/26/20   Historical Provider, MD   ibuprofen 600 mg tablet Take 1 tablet (600 mg) by mouth every 8 hours if needed for moderate pain (4 - 6). 1/23/24   Maxine Fernández MD   omega 3-dha-epa-fish oil 1,000 mg (120 mg-180 mg) capsule Take by mouth.    Historical Provider, MD   omeprazole (PriLOSEC) 20 mg DR capsule TAKE 1 CAPSULE Daily 30 minutes before a meal if taking ibuprofen  Patient taking differently: Take 1 capsule (20 mg) by mouth if needed. TAKE 1 CAPSULE Daily 30 minutes before a meal if taking ibuprofen 1/23/24   Maxine Fernández MD   turmeric root extract 500 mg capsule Take 1 capsule by mouth once daily. 7/21/21   Historical Provider, MD   ZINC PICOLINATE, BULK, MISC Take 50 mg by mouth if needed.    Historical Provider, MD LINDSAY  Review of Systems       Data and DIAGNOSTIC STUDIES REVIEWED   ECG 12 Lead    Result Date: 5/15/2024  Normal sinus rhythm Low voltage QRS Left anterior fascicular block Possible Lateral infarct , age undetermined  Abnormal ECG When compared with ECG of 18-AUG-2021 08:28, Left anterior fascicular block is now Present Borderline criteria for Lateral infarct are now Present Nonspecific T wave abnormality now evident in Lateral leads Confirmed by José Manuel Clifton ([id number]) on 5/15/2024 2:59:49 PM    CT abdomen pelvis wo IV contrast    Result Date: 4/26/2024  Interpreted By:  Kriss Douglass, STUDY: CT ABDOMEN PELVIS WO IV CONTRAST;  4/26/2024 10:18 am   INDICATION: Signs/Symptoms:5 day hx of left flank pain radiating to LLQ. Assess for K stones.   COMPARISON: None.   ACCESSION NUMBER(S): [id number]   ORDERING CLINICIAN: NALDO DEE   TECHNIQUE: CT of the abdomen and pelvis was performed. No oral, no intravenous contrast.   FINDINGS: LOWER CHEST: Images of the lung bases show no infiltrate or pleural fluid. There is bibasilar fibrosis.   ABDOMEN:   LIVER: There are benign simple hepatic cysts.   BILE DUCTS: There is no intrahepatic, common hepatic or common bile ductal dilatation.   GALLBLADDER: The gallbladder is unremarkable.   PANCREAS: The pancreas is unremarkable.   SPLEEN: The spleen is unremarkable. There is no splenic mass or splenomegaly.   ADRENAL GLANDS: The adrenal glands are unremarkable.   KIDNEYS AND URETERS: The kidneys demonstrate no mass.  There is no intrarenal calculus or hydronephrosis.   BOWEL: There is no bowel wall thickening, dilatation or obstruction. There are fecalith in the appendix but no appendiceal inflammation or evidence for acute appendicitis. There is a moderate amount of stool throughout the colon.   VESSELS: The abdominal and pelvic vessels are unremarkable.   PERITONEUM/RETROPERITONEUM/LYMPH NODES: There is no retroperitoneal or pelvic adenopathy.  There is no ascites.   ABDOMINAL WALL: The abdominal wall is unremarkable.   BONE AND SOFT TISSUE: There is no acute osseous finding.   There is a 4.6 x 2.7 x 1.9 cm cystic area in the right ovary. Although unusual in a postmenopausal female, this can  "be       1. No acute abdominal or pelvic pathology. No renal obstruction. 2. Fecalith in an otherwise normal appendix. 3. Constipation. 4. 4.6 x 2.7 x 1.9 cm cystic area right ovary. Although unusual in a postmenopausal female, this can be seen. Nonemergent pelvic ultrasound is recommended for further evaluation. 5. Benign hepatic cysts.   MACRO: None   Signed by: Kriss Douglass 4/26/2024 10:49 AM Dictation workstation:   [id number]     No results found for: \"URINECULTURE\", \"UAMICCOMM\"   Lab Results   Component Value Date    GLUCOSE 77 05/10/2024    CALCIUM 8.7 05/10/2024     05/10/2024    K 4.7 05/10/2024    CO2 31 05/10/2024     05/10/2024    BUN 18 05/10/2024    CREATININE 0.71 05/10/2024     Lab Results   Component Value Date    WBC 5.3 05/10/2024    HGB 13.4 05/10/2024    HCT 39.7 05/10/2024    MCV 93 05/10/2024     05/10/2024            "

## 2024-05-23 ENCOUNTER — ANESTHESIA EVENT (OUTPATIENT)
Dept: OPERATING ROOM | Facility: HOSPITAL | Age: 69
End: 2024-05-23
Payer: MEDICARE

## 2024-05-24 ENCOUNTER — HOSPITAL ENCOUNTER (OUTPATIENT)
Facility: HOSPITAL | Age: 69
Discharge: HOME | End: 2024-05-25
Attending: OBSTETRICS & GYNECOLOGY | Admitting: OBSTETRICS & GYNECOLOGY
Payer: MEDICARE

## 2024-05-24 ENCOUNTER — ANESTHESIA (OUTPATIENT)
Dept: OPERATING ROOM | Facility: HOSPITAL | Age: 69
End: 2024-05-24
Payer: MEDICARE

## 2024-05-24 DIAGNOSIS — Z90.710 S/P HYSTERECTOMY: ICD-10-CM

## 2024-05-24 DIAGNOSIS — G89.18 POSTOPERATIVE PAIN: ICD-10-CM

## 2024-05-24 DIAGNOSIS — N81.2 CYSTOCELE WITH SECOND DEGREE UTERINE PROLAPSE: Primary | ICD-10-CM

## 2024-05-24 DIAGNOSIS — N39.498 OTHER URINARY INCONTINENCE: ICD-10-CM

## 2024-05-24 LAB
ABO GROUP (TYPE) IN BLOOD: NORMAL
RH FACTOR (ANTIGEN D): NORMAL

## 2024-05-24 PROCEDURE — 57425 LAPAROSCOPY SURG COLPOPEXY: CPT | Performed by: OBSTETRICS & GYNECOLOGY

## 2024-05-24 PROCEDURE — 96372 THER/PROPH/DIAG INJ SC/IM: CPT | Mod: 59 | Performed by: STUDENT IN AN ORGANIZED HEALTH CARE EDUCATION/TRAINING PROGRAM

## 2024-05-24 PROCEDURE — 7100000001 HC RECOVERY ROOM TIME - INITIAL BASE CHARGE: Performed by: OBSTETRICS & GYNECOLOGY

## 2024-05-24 PROCEDURE — 2500000004 HC RX 250 GENERAL PHARMACY W/ HCPCS (ALT 636 FOR OP/ED)

## 2024-05-24 PROCEDURE — 7100000002 HC RECOVERY ROOM TIME - EACH INCREMENTAL 1 MINUTE: Performed by: OBSTETRICS & GYNECOLOGY

## 2024-05-24 PROCEDURE — 3600000004 HC OR TIME - INITIAL BASE CHARGE - PROCEDURE LEVEL FOUR: Performed by: OBSTETRICS & GYNECOLOGY

## 2024-05-24 PROCEDURE — A58571 PR LAPAROSCOPY W TOT HYSTERECTUTERUS <=250 GRAM  W TUBE/OVARY

## 2024-05-24 PROCEDURE — 3700000002 HC GENERAL ANESTHESIA TIME - EACH INCREMENTAL 1 MINUTE: Performed by: OBSTETRICS & GYNECOLOGY

## 2024-05-24 PROCEDURE — 99231 SBSQ HOSP IP/OBS SF/LOW 25: CPT | Performed by: OBSTETRICS & GYNECOLOGY

## 2024-05-24 PROCEDURE — 2500000001 HC RX 250 WO HCPCS SELF ADMINISTERED DRUGS (ALT 637 FOR MEDICARE OP): Performed by: STUDENT IN AN ORGANIZED HEALTH CARE EDUCATION/TRAINING PROGRAM

## 2024-05-24 PROCEDURE — 58571 TLH W/T/O 250 G OR LESS: CPT | Performed by: OBSTETRICS & GYNECOLOGY

## 2024-05-24 PROCEDURE — G0378 HOSPITAL OBSERVATION PER HR: HCPCS

## 2024-05-24 PROCEDURE — 2500000005 HC RX 250 GENERAL PHARMACY W/O HCPCS: Performed by: OBSTETRICS & GYNECOLOGY

## 2024-05-24 PROCEDURE — 36415 COLL VENOUS BLD VENIPUNCTURE: CPT | Performed by: OBSTETRICS & GYNECOLOGY

## 2024-05-24 PROCEDURE — 2500000001 HC RX 250 WO HCPCS SELF ADMINISTERED DRUGS (ALT 637 FOR MEDICARE OP): Performed by: OBSTETRICS & GYNECOLOGY

## 2024-05-24 PROCEDURE — 2720000007 HC OR 272 NO HCPCS: Performed by: OBSTETRICS & GYNECOLOGY

## 2024-05-24 PROCEDURE — 88307 TISSUE EXAM BY PATHOLOGIST: CPT | Mod: TC,AHULAB | Performed by: OBSTETRICS & GYNECOLOGY

## 2024-05-24 PROCEDURE — 2500000005 HC RX 250 GENERAL PHARMACY W/O HCPCS

## 2024-05-24 PROCEDURE — 57250 REPAIR RECTUM & VAGINA: CPT | Performed by: OBSTETRICS & GYNECOLOGY

## 2024-05-24 PROCEDURE — 3600000009 HC OR TIME - EACH INCREMENTAL 1 MINUTE - PROCEDURE LEVEL FOUR: Performed by: OBSTETRICS & GYNECOLOGY

## 2024-05-24 PROCEDURE — 2500000004 HC RX 250 GENERAL PHARMACY W/ HCPCS (ALT 636 FOR OP/ED): Performed by: STUDENT IN AN ORGANIZED HEALTH CARE EDUCATION/TRAINING PROGRAM

## 2024-05-24 PROCEDURE — 3700000001 HC GENERAL ANESTHESIA TIME - INITIAL BASE CHARGE: Performed by: OBSTETRICS & GYNECOLOGY

## 2024-05-24 PROCEDURE — A58571 PR LAPAROSCOPY W TOT HYSTERECTUTERUS <=250 GRAM  W TUBE/OVARY: Performed by: STUDENT IN AN ORGANIZED HEALTH CARE EDUCATION/TRAINING PROGRAM

## 2024-05-24 RX ORDER — OXYCODONE HYDROCHLORIDE 5 MG/1
5 TABLET ORAL EVERY 6 HOURS PRN
Qty: 5 TABLET | Refills: 0 | Status: SHIPPED | OUTPATIENT
Start: 2024-05-24 | End: 2024-05-27

## 2024-05-24 RX ORDER — MIDAZOLAM HYDROCHLORIDE 1 MG/ML
INJECTION INTRAMUSCULAR; INTRAVENOUS AS NEEDED
Status: DISCONTINUED | OUTPATIENT
Start: 2024-05-24 | End: 2024-05-24

## 2024-05-24 RX ORDER — ACETAMINOPHEN 500 MG
1000 TABLET ORAL EVERY 6 HOURS PRN
Qty: 30 TABLET | Refills: 0 | Status: SHIPPED | OUTPATIENT
Start: 2024-05-24

## 2024-05-24 RX ORDER — ACETAMINOPHEN 325 MG/1
1000 TABLET ORAL EVERY 6 HOURS
Status: DISCONTINUED | OUTPATIENT
Start: 2024-05-24 | End: 2024-06-01 | Stop reason: HOSPADM

## 2024-05-24 RX ORDER — BUPIVACAINE HYDROCHLORIDE 2.5 MG/ML
INJECTION, SOLUTION INFILTRATION; PERINEURAL AS NEEDED
Status: DISCONTINUED | OUTPATIENT
Start: 2024-05-24 | End: 2024-05-24 | Stop reason: HOSPADM

## 2024-05-24 RX ORDER — GABAPENTIN 300 MG/1
600 CAPSULE ORAL ONCE
Status: COMPLETED | OUTPATIENT
Start: 2024-05-24 | End: 2024-05-24

## 2024-05-24 RX ORDER — ENOXAPARIN SODIUM 100 MG/ML
40 INJECTION SUBCUTANEOUS ONCE
Status: COMPLETED | OUTPATIENT
Start: 2024-05-24 | End: 2024-05-24

## 2024-05-24 RX ORDER — ACETAMINOPHEN 325 MG/1
975 TABLET ORAL ONCE
Status: COMPLETED | OUTPATIENT
Start: 2024-05-24 | End: 2024-05-24

## 2024-05-24 RX ORDER — OXYCODONE HYDROCHLORIDE 5 MG/1
5 TABLET ORAL EVERY 4 HOURS PRN
Status: DISCONTINUED | OUTPATIENT
Start: 2024-05-24 | End: 2024-05-24 | Stop reason: HOSPADM

## 2024-05-24 RX ORDER — CEFAZOLIN 1 G/1
INJECTION, POWDER, FOR SOLUTION INTRAVENOUS AS NEEDED
Status: DISCONTINUED | OUTPATIENT
Start: 2024-05-24 | End: 2024-05-24

## 2024-05-24 RX ORDER — PROPOFOL 10 MG/ML
INJECTION, EMULSION INTRAVENOUS AS NEEDED
Status: DISCONTINUED | OUTPATIENT
Start: 2024-05-24 | End: 2024-05-24

## 2024-05-24 RX ORDER — POLYETHYLENE GLYCOL 3350 17 G/17G
17 POWDER, FOR SOLUTION ORAL DAILY
Qty: 30 PACKET | Refills: 0 | Status: SHIPPED | OUTPATIENT
Start: 2024-05-24 | End: 2024-06-23

## 2024-05-24 RX ORDER — DROPERIDOL 2.5 MG/ML
0.62 INJECTION, SOLUTION INTRAMUSCULAR; INTRAVENOUS ONCE AS NEEDED
Status: DISCONTINUED | OUTPATIENT
Start: 2024-05-24 | End: 2024-05-24 | Stop reason: HOSPADM

## 2024-05-24 RX ORDER — ONDANSETRON HYDROCHLORIDE 2 MG/ML
INJECTION, SOLUTION INTRAVENOUS AS NEEDED
Status: DISCONTINUED | OUTPATIENT
Start: 2024-05-24 | End: 2024-05-24

## 2024-05-24 RX ORDER — METOCLOPRAMIDE HYDROCHLORIDE 5 MG/ML
10 INJECTION INTRAMUSCULAR; INTRAVENOUS EVERY 6 HOURS PRN
Status: DISCONTINUED | OUTPATIENT
Start: 2024-05-24 | End: 2024-05-25 | Stop reason: HOSPADM

## 2024-05-24 RX ORDER — SODIUM CHLORIDE, SODIUM LACTATE, POTASSIUM CHLORIDE, CALCIUM CHLORIDE 600; 310; 30; 20 MG/100ML; MG/100ML; MG/100ML; MG/100ML
100 INJECTION, SOLUTION INTRAVENOUS CONTINUOUS
Status: DISCONTINUED | OUTPATIENT
Start: 2024-05-24 | End: 2024-05-24

## 2024-05-24 RX ORDER — CELECOXIB 200 MG/1
400 CAPSULE ORAL ONCE
Status: COMPLETED | OUTPATIENT
Start: 2024-05-24 | End: 2024-05-24

## 2024-05-24 RX ORDER — DIPHENHYDRAMINE HYDROCHLORIDE 50 MG/ML
12.5 INJECTION INTRAMUSCULAR; INTRAVENOUS ONCE AS NEEDED
Status: DISCONTINUED | OUTPATIENT
Start: 2024-05-24 | End: 2024-05-24 | Stop reason: HOSPADM

## 2024-05-24 RX ORDER — POLYETHYLENE GLYCOL 3350 17 G/17G
17 POWDER, FOR SOLUTION ORAL DAILY
Status: DISCONTINUED | OUTPATIENT
Start: 2024-05-24 | End: 2024-05-25 | Stop reason: HOSPADM

## 2024-05-24 RX ORDER — ONDANSETRON HYDROCHLORIDE 2 MG/ML
4 INJECTION, SOLUTION INTRAVENOUS ONCE AS NEEDED
Status: DISCONTINUED | OUTPATIENT
Start: 2024-05-24 | End: 2024-05-24 | Stop reason: HOSPADM

## 2024-05-24 RX ORDER — METOCLOPRAMIDE 10 MG/1
10 TABLET ORAL EVERY 6 HOURS PRN
Status: DISCONTINUED | OUTPATIENT
Start: 2024-05-24 | End: 2024-05-25 | Stop reason: HOSPADM

## 2024-05-24 RX ORDER — KETOROLAC TROMETHAMINE 30 MG/ML
15 INJECTION, SOLUTION INTRAMUSCULAR; INTRAVENOUS EVERY 6 HOURS
Status: DISCONTINUED | OUTPATIENT
Start: 2024-05-24 | End: 2024-05-25 | Stop reason: HOSPADM

## 2024-05-24 RX ORDER — CEFAZOLIN SODIUM 2 G/100ML
2 INJECTION, SOLUTION INTRAVENOUS ONCE
Status: DISCONTINUED | OUTPATIENT
Start: 2024-05-24 | End: 2024-05-24 | Stop reason: HOSPADM

## 2024-05-24 RX ORDER — PHENAZOPYRIDINE HYDROCHLORIDE 100 MG/1
95 TABLET, FILM COATED ORAL 3 TIMES DAILY PRN
Status: DISCONTINUED | OUTPATIENT
Start: 2024-05-24 | End: 2024-05-25 | Stop reason: HOSPADM

## 2024-05-24 RX ORDER — OXYCODONE HYDROCHLORIDE 5 MG/1
10 TABLET ORAL EVERY 4 HOURS PRN
Status: DISCONTINUED | OUTPATIENT
Start: 2024-05-24 | End: 2024-05-24 | Stop reason: HOSPADM

## 2024-05-24 RX ORDER — LIDOCAINE HYDROCHLORIDE 10 MG/ML
0.1 INJECTION, SOLUTION EPIDURAL; INFILTRATION; INTRACAUDAL; PERINEURAL ONCE
Status: DISCONTINUED | OUTPATIENT
Start: 2024-05-24 | End: 2024-05-24 | Stop reason: HOSPADM

## 2024-05-24 RX ORDER — FENTANYL CITRATE 50 UG/ML
INJECTION, SOLUTION INTRAMUSCULAR; INTRAVENOUS AS NEEDED
Status: DISCONTINUED | OUTPATIENT
Start: 2024-05-24 | End: 2024-05-24

## 2024-05-24 RX ORDER — ROCURONIUM BROMIDE 10 MG/ML
INJECTION, SOLUTION INTRAVENOUS AS NEEDED
Status: DISCONTINUED | OUTPATIENT
Start: 2024-05-24 | End: 2024-05-24

## 2024-05-24 RX ORDER — ALBUTEROL SULFATE 0.83 MG/ML
2.5 SOLUTION RESPIRATORY (INHALATION) ONCE AS NEEDED
Status: DISCONTINUED | OUTPATIENT
Start: 2024-05-24 | End: 2024-05-24 | Stop reason: HOSPADM

## 2024-05-24 RX ORDER — MEPERIDINE HYDROCHLORIDE 25 MG/ML
12.5 INJECTION INTRAMUSCULAR; INTRAVENOUS; SUBCUTANEOUS EVERY 10 MIN PRN
Status: DISCONTINUED | OUTPATIENT
Start: 2024-05-24 | End: 2024-05-24 | Stop reason: HOSPADM

## 2024-05-24 RX ORDER — LIDOCAINE HYDROCHLORIDE 20 MG/ML
INJECTION, SOLUTION INFILTRATION; PERINEURAL AS NEEDED
Status: DISCONTINUED | OUTPATIENT
Start: 2024-05-24 | End: 2024-05-24

## 2024-05-24 RX ORDER — PHENAZOPYRIDINE HYDROCHLORIDE 100 MG/1
200 TABLET, FILM COATED ORAL ONCE
Status: COMPLETED | OUTPATIENT
Start: 2024-05-24 | End: 2024-05-24

## 2024-05-24 RX ORDER — SENNOSIDES 8.6 MG/1
1 TABLET ORAL NIGHTLY
Status: DISCONTINUED | OUTPATIENT
Start: 2024-05-24 | End: 2024-05-25 | Stop reason: HOSPADM

## 2024-05-24 RX ORDER — SODIUM CHLORIDE 9 MG/ML
50 INJECTION, SOLUTION INTRAVENOUS CONTINUOUS
Status: DISCONTINUED | OUTPATIENT
Start: 2024-05-24 | End: 2024-05-25 | Stop reason: HOSPADM

## 2024-05-24 RX ORDER — LABETALOL HYDROCHLORIDE 5 MG/ML
5 INJECTION, SOLUTION INTRAVENOUS ONCE AS NEEDED
Status: DISCONTINUED | OUTPATIENT
Start: 2024-05-24 | End: 2024-05-24 | Stop reason: HOSPADM

## 2024-05-24 RX ORDER — HYDRALAZINE HYDROCHLORIDE 20 MG/ML
5 INJECTION INTRAMUSCULAR; INTRAVENOUS EVERY 30 MIN PRN
Status: DISCONTINUED | OUTPATIENT
Start: 2024-05-24 | End: 2024-05-24 | Stop reason: HOSPADM

## 2024-05-24 RX ORDER — HEPARIN SODIUM 5000 [USP'U]/ML
5000 INJECTION, SOLUTION INTRAVENOUS; SUBCUTANEOUS ONCE
Status: COMPLETED | OUTPATIENT
Start: 2024-05-24 | End: 2024-05-24

## 2024-05-24 RX ORDER — IBUPROFEN 600 MG/1
600 TABLET ORAL EVERY 6 HOURS
Qty: 28 TABLET | Refills: 0 | Status: SHIPPED | OUTPATIENT
Start: 2024-05-24 | End: 2024-05-31

## 2024-05-24 RX ORDER — OXYCODONE HYDROCHLORIDE 5 MG/1
5 TABLET ORAL EVERY 4 HOURS PRN
Status: DISCONTINUED | OUTPATIENT
Start: 2024-05-24 | End: 2024-05-25 | Stop reason: HOSPADM

## 2024-05-24 RX ADMIN — OXYCODONE HYDROCHLORIDE 5 MG: 5 TABLET ORAL at 16:02

## 2024-05-24 RX ADMIN — SENNOSIDES 8.6 MG: 8.6 TABLET, FILM COATED ORAL at 21:22

## 2024-05-24 RX ADMIN — ACETAMINOPHEN 975 MG: 325 TABLET ORAL at 21:22

## 2024-05-24 RX ADMIN — FENTANYL CITRATE 50 MCG: 50 INJECTION, SOLUTION INTRAMUSCULAR; INTRAVENOUS at 12:53

## 2024-05-24 RX ADMIN — CEFAZOLIN 2 G: 330 INJECTION, POWDER, FOR SOLUTION INTRAMUSCULAR; INTRAVENOUS at 12:31

## 2024-05-24 RX ADMIN — PROPOFOL 50 MG: 10 INJECTION, EMULSION INTRAVENOUS at 13:47

## 2024-05-24 RX ADMIN — HEPARIN SODIUM 5000 UNITS: 5000 INJECTION INTRAVENOUS; SUBCUTANEOUS at 09:33

## 2024-05-24 RX ADMIN — FENTANYL CITRATE 50 MCG: 50 INJECTION, SOLUTION INTRAMUSCULAR; INTRAVENOUS at 12:25

## 2024-05-24 RX ADMIN — KETOROLAC TROMETHAMINE 15 MG: 30 INJECTION, SOLUTION INTRAMUSCULAR at 21:24

## 2024-05-24 RX ADMIN — PROPOFOL 50 MG: 10 INJECTION, EMULSION INTRAVENOUS at 12:53

## 2024-05-24 RX ADMIN — LIDOCAINE HYDROCHLORIDE 100 MG: 20 INJECTION, SOLUTION INFILTRATION; PERINEURAL at 12:25

## 2024-05-24 RX ADMIN — ACETAMINOPHEN 975 MG: 325 TABLET ORAL at 09:33

## 2024-05-24 RX ADMIN — ENOXAPARIN SODIUM 40 MG: 40 INJECTION SUBCUTANEOUS at 21:22

## 2024-05-24 RX ADMIN — CELECOXIB 400 MG: 200 CAPSULE ORAL at 09:33

## 2024-05-24 RX ADMIN — PROPOFOL 100 MG: 10 INJECTION, EMULSION INTRAVENOUS at 12:25

## 2024-05-24 RX ADMIN — ONDANSETRON 4 MG: 2 INJECTION INTRAMUSCULAR; INTRAVENOUS at 14:22

## 2024-05-24 RX ADMIN — DEXAMETHASONE SODIUM PHOSPHATE 4 MG: 4 INJECTION, SOLUTION INTRA-ARTICULAR; INTRALESIONAL; INTRAMUSCULAR; INTRAVENOUS; SOFT TISSUE at 12:32

## 2024-05-24 RX ADMIN — METOCLOPRAMIDE 10 MG: 5 INJECTION, SOLUTION INTRAMUSCULAR; INTRAVENOUS at 18:55

## 2024-05-24 RX ADMIN — POLYETHYLENE GLYCOL 3350 17 G: 17 POWDER, FOR SOLUTION ORAL at 21:22

## 2024-05-24 RX ADMIN — GABAPENTIN 600 MG: 300 CAPSULE ORAL at 09:33

## 2024-05-24 RX ADMIN — SUGAMMADEX 200 MG: 100 INJECTION, SOLUTION INTRAVENOUS at 14:43

## 2024-05-24 RX ADMIN — PHENAZOPYRIDINE 200 MG: 100 TABLET ORAL at 09:33

## 2024-05-24 RX ADMIN — MIDAZOLAM HYDROCHLORIDE 2 MG: 1 INJECTION, SOLUTION INTRAMUSCULAR; INTRAVENOUS at 12:21

## 2024-05-24 RX ADMIN — SODIUM CHLORIDE 50 ML/HR: 9 INJECTION, SOLUTION INTRAVENOUS at 18:55

## 2024-05-24 RX ADMIN — ROCURONIUM BROMIDE 50 MG: 10 INJECTION, SOLUTION INTRAVENOUS at 12:25

## 2024-05-24 RX ADMIN — ROCURONIUM BROMIDE 20 MG: 10 INJECTION, SOLUTION INTRAVENOUS at 13:55

## 2024-05-24 RX ADMIN — SODIUM CHLORIDE, POTASSIUM CHLORIDE, SODIUM LACTATE AND CALCIUM CHLORIDE 100 ML/HR: 600; 310; 30; 20 INJECTION, SOLUTION INTRAVENOUS at 09:33

## 2024-05-24 SDOH — SOCIAL STABILITY: SOCIAL INSECURITY: DOES ANYONE TRY TO KEEP YOU FROM HAVING/CONTACTING OTHER FRIENDS OR DOING THINGS OUTSIDE YOUR HOME?: NO

## 2024-05-24 SDOH — SOCIAL STABILITY: SOCIAL INSECURITY: ARE THERE ANY APPARENT SIGNS OF INJURIES/BEHAVIORS THAT COULD BE RELATED TO ABUSE/NEGLECT?: NO

## 2024-05-24 SDOH — SOCIAL STABILITY: SOCIAL INSECURITY: ARE YOU OR HAVE YOU BEEN THREATENED OR ABUSED PHYSICALLY, EMOTIONALLY, OR SEXUALLY BY ANYONE?: NO

## 2024-05-24 SDOH — SOCIAL STABILITY: SOCIAL INSECURITY: HAVE YOU HAD THOUGHTS OF HARMING ANYONE ELSE?: NO

## 2024-05-24 SDOH — SOCIAL STABILITY: SOCIAL INSECURITY: DO YOU FEEL UNSAFE GOING BACK TO THE PLACE WHERE YOU ARE LIVING?: NO

## 2024-05-24 SDOH — SOCIAL STABILITY: SOCIAL INSECURITY: DO YOU FEEL ANYONE HAS EXPLOITED OR TAKEN ADVANTAGE OF YOU FINANCIALLY OR OF YOUR PERSONAL PROPERTY?: NO

## 2024-05-24 SDOH — HEALTH STABILITY: MENTAL HEALTH: CURRENT SMOKER: 0

## 2024-05-24 SDOH — SOCIAL STABILITY: SOCIAL INSECURITY: HAS ANYONE EVER THREATENED TO HURT YOUR FAMILY OR YOUR PETS?: NO

## 2024-05-24 SDOH — SOCIAL STABILITY: SOCIAL INSECURITY: ABUSE: ADULT

## 2024-05-24 SDOH — SOCIAL STABILITY: SOCIAL INSECURITY: WERE YOU ABLE TO COMPLETE ALL THE BEHAVIORAL HEALTH SCREENINGS?: YES

## 2024-05-24 ASSESSMENT — PAIN SCALES - GENERAL
PAINLEVEL_OUTOF10: 0 - NO PAIN
PAINLEVEL_OUTOF10: 0 - NO PAIN
PAINLEVEL_OUTOF10: 3
PAINLEVEL_OUTOF10: 4
PAINLEVEL_OUTOF10: 5 - MODERATE PAIN
PAINLEVEL_OUTOF10: 5 - MODERATE PAIN
PAINLEVEL_OUTOF10: 3
PAINLEVEL_OUTOF10: 3
PAINLEVEL_OUTOF10: 0 - NO PAIN
PAINLEVEL_OUTOF10: 3
PAINLEVEL_OUTOF10: 0 - NO PAIN
PAINLEVEL_OUTOF10: 5 - MODERATE PAIN

## 2024-05-24 ASSESSMENT — LIFESTYLE VARIABLES
HOW OFTEN DO YOU HAVE 6 OR MORE DRINKS ON ONE OCCASION: NEVER
AUDIT-C TOTAL SCORE: 4
SKIP TO QUESTIONS 9-10: 1
HOW OFTEN DO YOU HAVE A DRINK CONTAINING ALCOHOL: 4 OR MORE TIMES A WEEK
AUDIT-C TOTAL SCORE: 4
HOW MANY STANDARD DRINKS CONTAINING ALCOHOL DO YOU HAVE ON A TYPICAL DAY: 1 OR 2

## 2024-05-24 ASSESSMENT — PATIENT HEALTH QUESTIONNAIRE - PHQ9
SUM OF ALL RESPONSES TO PHQ9 QUESTIONS 1 & 2: 0
1. LITTLE INTEREST OR PLEASURE IN DOING THINGS: NOT AT ALL
2. FEELING DOWN, DEPRESSED OR HOPELESS: NOT AT ALL

## 2024-05-24 ASSESSMENT — ACTIVITIES OF DAILY LIVING (ADL)
ADEQUATE_TO_COMPLETE_ADL: YES
HEARING - LEFT EAR: FUNCTIONAL
WALKS IN HOME: INDEPENDENT
JUDGMENT_ADEQUATE_SAFELY_COMPLETE_DAILY_ACTIVITIES: YES
PATIENT'S MEMORY ADEQUATE TO SAFELY COMPLETE DAILY ACTIVITIES?: YES
GROOMING: INDEPENDENT
BATHING: INDEPENDENT
LACK_OF_TRANSPORTATION: NO
DRESSING YOURSELF: INDEPENDENT
FEEDING YOURSELF: INDEPENDENT
HEARING - RIGHT EAR: FUNCTIONAL
TOILETING: INDEPENDENT

## 2024-05-24 ASSESSMENT — PAIN - FUNCTIONAL ASSESSMENT
PAIN_FUNCTIONAL_ASSESSMENT: VAS (VISUAL ANALOG SCALE)
PAIN_FUNCTIONAL_ASSESSMENT: 0-10

## 2024-05-24 ASSESSMENT — COGNITIVE AND FUNCTIONAL STATUS - GENERAL
MOBILITY SCORE: 18
MOVING TO AND FROM BED TO CHAIR: A LITTLE
MOVING FROM LYING ON BACK TO SITTING ON SIDE OF FLAT BED WITH BEDRAILS: A LITTLE
STANDING UP FROM CHAIR USING ARMS: A LITTLE
CLIMB 3 TO 5 STEPS WITH RAILING: A LITTLE
MOVING TO AND FROM BED TO CHAIR: A LITTLE
DAILY ACTIVITIY SCORE: 22
DRESSING REGULAR LOWER BODY CLOTHING: A LITTLE
STANDING UP FROM CHAIR USING ARMS: A LITTLE
MOVING FROM LYING ON BACK TO SITTING ON SIDE OF FLAT BED WITH BEDRAILS: A LITTLE
PATIENT BASELINE BEDBOUND: NO
DRESSING REGULAR UPPER BODY CLOTHING: A LITTLE
CLIMB 3 TO 5 STEPS WITH RAILING: A LITTLE
WALKING IN HOSPITAL ROOM: A LITTLE
TURNING FROM BACK TO SIDE WHILE IN FLAT BAD: A LITTLE
WALKING IN HOSPITAL ROOM: A LITTLE
MOBILITY SCORE: 18
TURNING FROM BACK TO SIDE WHILE IN FLAT BAD: A LITTLE

## 2024-05-24 ASSESSMENT — COLUMBIA-SUICIDE SEVERITY RATING SCALE - C-SSRS
2. HAVE YOU ACTUALLY HAD ANY THOUGHTS OF KILLING YOURSELF?: NO
6. HAVE YOU EVER DONE ANYTHING, STARTED TO DO ANYTHING, OR PREPARED TO DO ANYTHING TO END YOUR LIFE?: NO
1. IN THE PAST MONTH, HAVE YOU WISHED YOU WERE DEAD OR WISHED YOU COULD GO TO SLEEP AND NOT WAKE UP?: NO

## 2024-05-24 ASSESSMENT — PAIN DESCRIPTION - DESCRIPTORS: DESCRIPTORS: DULL;ACHING;PRESSURE

## 2024-05-24 ASSESSMENT — PAIN DESCRIPTION - LOCATION
LOCATION: ABDOMEN
LOCATION: ABDOMEN

## 2024-05-24 NOTE — H&P
History Of Present Illness  TRI Savage is a 69 y.o. female presenting for   TLH, BSO, USLS, anterior & posterior repairs, possible perineorrhapy, cystoscopy, and MUS  in the setting of cystocele with uterine descent.     Past Medical History  Past Medical History:   Diagnosis Date    Anxiety disorder, unspecified     pt denies    Aortic valve regurgitation     mild on ECHO  and mild on repeat ECHO 2023    B12 deficiency     Chest pain      - stress test no ischemia    Closed fracture of pelvis (Multi) 2016    Cystocele with second degree uterine prolapse     GERD (gastroesophageal reflux disease)     Hepatic cyst     incidentalally found on CT calcium score , gallbladder U/S :LIVER:  The right hepatic lobe measures 17.0 cm in length. Normal  echogenicity and contour.  Multiple cysts are present, measuring up  to 1.6 cm in the left hepatic lobe and 1.1 cm in the right hepatic  lobe.    Hiatal hernia     Hyperlipidemia     Osteopenia     Osteoporosis     Prediabetes     12.1.23: A1C 5.9%    Psoriasis     Vitamin D deficiency     Vitamin D deficiency, unspecified        Surgical History  Past Surgical History:   Procedure Laterality Date     SECTION, CLASSIC  2013     Section    COLONOSCOPY  2013    Colonoscopy (Fiberoptic)    DILATION AND CURETTAGE OF UTERUS  2013    Dilation And Curettage    HYSTEROSCOPY      OTHER SURGICAL HISTORY  2013    Cervix Cryosurgery    OTHER SURGICAL HISTORY  10/12/2021    Ulnar collateral ligament of elbow reconstruction    OTHER SURGICAL HISTORY  10/12/2021    Metacarpophalangeal joint arthroplasty    OTHER SURGICAL HISTORY      urthethra carbuncle excision    ROTATOR CUFF REPAIR Right     SINUS SURGERY  2013    Sinus Surgery    SYNOVECTOMY      WRIST SURGERY Right 10/12/2021    Wrist arthroscopy/CTR/Ulnar collateral ligament of thumb rupture repair        Social History  She reports that she has never smoked. She  has never used smokeless tobacco. She reports that she does not currently use alcohol after a past usage of about 6.0 standard drinks of alcohol per week. She reports that she does not use drugs.    Family History  Family History   Problem Relation Name Age of Onset    Atrial fibrillation Mother           age 91    Hypertension Mother      Dementia Mother      Other (pacemaker) Father           age 82    Heart attack Father  82    Other (arthritis hip) Brother      No Known Problems Brother      Diabetes type II Paternal Grandmother      Breast cancer Cousin          Allergies  Patient has no known allergies.    Review of Systems   All other systems reviewed and are negative.       Physical Exam  Constitutional:       Appearance: Normal appearance.   Cardiovascular:      Rate and Rhythm: Normal rate.   Pulmonary:      Effort: Pulmonary effort is normal.   Abdominal:      General: Abdomen is flat.   Musculoskeletal:         General: Normal range of motion.      Cervical back: Normal range of motion.   Skin:     General: Skin is warm.   Neurological:      Mental Status: She is alert.   Psychiatric:         Mood and Affect: Mood normal.          Last Recorded Vitals  There were no vitals taken for this visit.    Relevant Results             Assessment/Plan   Active Problems:    Absence of bladder continence    Cystocele with second degree uterine prolapse      TLH, BSO, USLS, anterior & posterior repairs, possible perineorrhapy, cystoscopy, and MUS            Amine MD Fredy

## 2024-05-24 NOTE — PERIOPERATIVE NURSING NOTE
Report on Wendy Savage, MRN [id number]; room     ; Pt of Dr Campbell  Procedure:  Laparoscopic total hysterectomy, bilateral salpingo-oophorectomy, colpopexy, anterior and posterior repairs, perineorrhaphy mid urethral sling insertion, cystoscopy  Anesthesia type:  General  Estimated blood loss (EBL) in OR:  50 ml  Relevant PMH:  Aortic valve regurgitation, closed fracture of pelvis, cystocele w/ 2nd degree uterine prolapse, GERD, hepatic cyst, hiatal hernia, hyperlipidemia, osteopenia, osteoporosis, prediabetes, psoriasis  Orientation/mental status:  Drowsy, but oriented x 4  Incision site(s)/location, surgical dressing(s):  5 abdominal port site incisions covered with liquiband and umbilical site covered with a 2x2 and transparent dressing.   Fluids given in OR/PACU, IV site(s), and drips/fluids currently infusing:  IV fluids in OR 1000 ml and IVF in PACU 200 ml  PO status (last oral intake):  Tolerated po fluids and pudding in phase 1  Last set of vital signs & O2 requirements:  36.7- 156/81-62-15- O2 saturation 98% on room air  Current pain level & last pain/nausea medication dose/time given:  Current pain level 5; Received oxycodone 5mg at 1602.  Ruiz in place:  Secured with stat lock  SCDs on : yes  Mode of transport (cart or bed):  cart  Belongings sent with patient: personal belongings and clothing, cell phone.  Emergency contact (name, relationship, phone number):  Bill [phone].  PACU RN name and call back number:  Elisa Ayala RN

## 2024-05-24 NOTE — OP NOTE
Laparoscopic Total Hysterectomy; Bilateral Salpingectomy-Oophorectomy (B), Laparoscopic Colpopexy, Posterior Repairs;  Perineorrhaphy, Cystoscopy Operative Note     Date: 2024  OR Location: [facility] A OR    Name: Wendy Savage, : 1955, Age: 69 y.o., MRN: [id number], Sex: female    Diagnosis  Pre-op Diagnosis     * Other urinary incontinence [N39.498]     * Cystocele with second degree uterine prolapse [N81.2] Post-op Diagnosis     * Other urinary incontinence [N39.498]     * Cystocele with second degree uterine prolapse [N81.2]     Procedures  Laparoscopic Total Hysterectomy; Bilateral Salpingectomy-Oophorectomy  36546 - [place] LAPAROSCOPY W TOTAL HYSTERECTOMY UTERUS 250 GM/<    Laparoscopic Total Hysterectomy; Bilateral Salpingectomy-Oophorectomy  15449 - [place] LAPAROSCOPY W/RMVL ADNEXAL STRUCTURES    Laparoscopic Colpopexy  15851 - [place] LAPAROSCOPY COLPOPEXY SUSPENSION VAGINAL APEX    Posterior Repairs;  Perineorrhaphy  55236 - [place] CMBND ANTERPOST COLPORRAPHY W/CYSTO    Cystoscopy  30488 - [place] CYSTOURETHROSCOPY    [place] LAPAROSCOPY COLPOPEXY SUSPENSION VAGINAL APEX [60443]  Surgeons      * Zeynep Campbell - Primary    Resident/Fellow/Other Assistant:  Katherine Mckeon MD - URPS Fellow     Procedure Summary  Anesthesia: General  ASA: II  Anesthesia Staff: Anesthesiologist: Lynette Carreon MD  C-AA: NIKO Hagan  Estimated Blood Loss: 50mL  Intra-op Medications:   Administrations occurring from 1030 to 1430 on 24:   Medication Name Total Dose   lactated Ringer's infusion Cannot be calculated              Anesthesia Record               Intraprocedure I/O Totals          Intake    lactated Ringer's infusion 500.00 mL    Total Intake 500 mL          Specimen:   ID Type Source Tests Collected by Time   1 : CERVIX , UTERUS, BILATERAL FALLOPIAN TUBES AND BILATERAL OVARIES Tissue UTERUS, CERVIX, FALLOPIAN TUBES AND OVARIES BILATERAL SURGICAL PATHOLOGY EXAM Zeynep Campbell MD 2024 1330   2 :  VAGINAL MUCOSA Tissue VAGINAL MUCOSA SURGICAL PATHOLOGY EXAM Zeynep Campbell MD 5/24/2024 2402        Staff:   Circulator: Shameka Sawyer Person: Yashira Acosta Circulator: Roz Acosta Scrub: More       Drains and/or Catheters:   Urethral Catheter Non-latex 16 Fr. (Active)       Findings: stage 2 anterior predominant prolapse, normal uterus and cervix and left adnexa, right ovary with 2x2cm simple-appearing cyst and seaparate 2x2cm paratubal cyst at the fimbriated end of right uterine tube; filmy adhesions to anterior abdominal wall bilaterally along upper abdomen and adhering ascending colon on the right side above the pelvic brim; on cystoscopy normal bladder mucosa without evidence of injury or foreign body, bilateral ureteral orifices with active efflux visualized      Indications: TRI Savage is an 69 y.o. female who is having surgery for Other urinary incontinence [N39.498]  Cystocele with second degree uterine prolapse [N81.2] that desired surgical management.     The patient was seen in the preoperative area. The risks, benefits, complications, treatment options, non-operative alternatives, expected recovery and outcomes were discussed with the patient. The possibilities of reaction to medication, pulmonary aspiration, injury to surrounding structures, bleeding, recurrent infection, the need for additional procedures, failure to diagnose a condition, and creating a complication requiring transfusion or operation were discussed with the patient. The patient concurred with the proposed plan, giving informed consent.  The site of surgery was properly noted/marked if necessary per policy. The patient has been actively warmed in preoperative area. Preoperative antibiotics have been ordered and given within 1 hours of incision. Venous thrombosis prophylaxis have been ordered including bilateral sequential compression devices and chemical prophylaxis    Procedure Details: After the risks, benefits,  and alternatives to the procedure were explained to the patient, consent was obtained. She was taken back to the operating room and placed in the supine position on the operating room table. General anesthesia was induced and noted to be adequate. She was then placed into the dorsal lithotomy position with her legs in yellow fin stirrups. Antibiotics were administered. Her abdomen, vagina, and perineum were then prepared and draped in the usual sterile fashion. A Ruiz catheter was inserted into her bladder and was draining clear urine at the beginning of the procedure. A Surgimatix uterine manipulator was inserted into the uterus as a means to manipulate the uterus.    Attention was then turned to the abdomen where a 12 mm vertical skin incision was made in the umbilicus. The open Edward technique was used to enter the abdomen and A 12 mm trocar with balloon was then inserted. Intraperitoneal placement with the 10 mm laparoscope was confirmed prior to pneumoperitoneum was achieved. There was noted to be no injuries upon entry. A survey of the upper abdomen was performed which noted filmy adhesions but otherwise normal anatomy. The ascending colon was noted to be adherent with filmy adhesions to the anterior/lateral abdominal wall. The patient was then placed in steep Trendelenburg. Two 5 mm bilateral lower quadrant trocars and a 5mm left upper quadrant trocar were then inserted under direct visualization without any complications noted. A 10 mm suprapubic trocar was inserted under direct visualization without any complications noted.     The retroperitoneal space was developed parallel and lateral to the IP ligament on the right side. The ureter was identified and was well away. The IP ligament was skeletonized, coagulated, and cut.  The remaining peritoneal attachments of the ovary were then taken down to free the ovary. This process was repeated on the left side.    The left round ligament was cauterized and  transected. The anterior and posterior leaves of the broad ligament were divided. The bladder flap was then created. The left uterine artery was skeletonized. The posterior peritoneum was released. The left uterine artery was then cauterized several times and transected. Attention was turned to the right side where in a similar fashion, the right fallopian tube was cauterized several times and transected at the level of the mesosalpinx. It was then amputated and removed from the abdomen. The right round ligament was cauterized several times and transected. The anterior and posterior leaves of the broad ligament were then . The bladder flap was further created. The posterior peritoneum was released. The right uterine artery was then skeletonized. It was then cauterized several times and transected. The colpotomy was performed on monopolar cut. The uterus and cervix were then delivered through the vagina. The vagina was then repaired using a 2-0 V-Loc in a running continuous 2 layer fashion. Copious irrigation was then performed, and there was noted to be excellent hemostasis after a figure-of-eight was placed at the right corner of the colpotomy with 2-0 Vicryl.     Attention was then turned to the reconstructive procedure. The bilateral uterosacral ligaments were sutured to the vaginal cuff with 0 Gortex, creating an apical suspension. Cystoscopy was then performed with the normal findings as above. All the instruments were then removed. Counts were correct. The umbilical fascia was then repaired using 0 Vicryl in a figure-of-eight, and the skin incisions were repaired using 3-0 Monocryl in a subcuticular fashion. The skin incisions were approximated with Dermabond.    Finally, attention was turned to the posterior repair and perineorrhaphy. The perineoplasty margins were then outlined and injected with 1% lidocaine with epinephrine. A superficial incision was made through the outline with a knife. The  perineal area was de-skinned using a knife. The posterior vaginal wall mucosa was then infiltrated with 1% lidocaine and was  from the rectum with Metzenbaum scissors. Hemostasis was achieved with electrosurgery. Once the dissection was completed, the rectovaginal fascial tissue and then the perineal muscular tissue was plicated in the midline with a series of 3-0 Vicryl in U stitches until the dead space was completely closed. The vaginal mucosa was then re-approximated with 3-0 Monocryl until the wound was completely closed.     The patient was then awakened from anesthesia, having tolerated procedure well, and was taken to the recovery room in stable condition. All sponge, needle, and instrument counts were correct at the end the case.    Katherine Mckeon MD, Urogynecology Fellow  Dr Campbell was present and scrubbed in for the entirety of the procedure.     Complications:  None; patient tolerated the procedure well.    Disposition: PACU - hemodynamically stable.  Condition: stable

## 2024-05-24 NOTE — ANESTHESIA PREPROCEDURE EVALUATION
Patient: Wendy Savage    Procedure Information       Date/Time: 05/24/24 1030    Procedures:       Laparoscopic Total Hysterectomy; Bilateral Salpingectomy-Oophorectomy (Bilateral)      Laparoscopic Colpopexy      Anterior and Posterior Repairs; Possible Perineorrhaphy      Mid-Urethral Sling Insertion      Cystoscopy    Location: [facility] A OR 04 / Virtual [facility] A OR    Surgeons: Zeynep Campbell MD            Relevant Problems   Anesthesia   (-) PONV (postoperative nausea and vomiting)      Cardiac   (+) Nonrheumatic aortic valve insufficiency      Pulmonary   (-) Asthma (HHS-HCC)      GI   (+) GERD (gastroesophageal reflux disease)   (+) Hiatal hernia      Liver   (+) Hepatic cyst      Skin  Psoriasis        Clinical information reviewed:                   NPO Detail:  No data recorded     Physical Exam    Airway  Mallampati: II  TM distance: >3 FB  Neck ROM: full     Cardiovascular   Rhythm: regular  Rate: normal     Dental - normal exam     Pulmonary - normal exam     Abdominal - normal exam             Anesthesia Plan    History of general anesthesia?: yes  History of complications of general anesthesia?: no    ASA 2     general     The patient is not a current smoker.    intravenous induction   Postoperative administration of opioids is intended.  Trial extubation is planned.  Anesthetic plan and risks discussed with patient.  Use of blood products discussed with patient who consented to blood products.    Plan discussed with CAA and attending.

## 2024-05-24 NOTE — DISCHARGE INSTRUCTIONS
Call Dr. Campbell's office for any problems and/or concerns    *Walk as much as possible, it is ok to use stairs carefully  *Ok to shower, avoid soaking in tub baths or swimming for 4-6 weeks after surgery   *Continue the coughing and deep breathing exercises that your learned in the hospital  *No lifting/straining and avoid constipation for 4-6 weeks (Avoid strenuous activity)  *Prevent constipation by using stool softeners and staying hydrated, so that you do not strain against your stitches or have pain from constipation  *Vaginal rest for 6 weeks (Do not put anything in your vagina except prescribed vaginal estrogen. Do not use tampons or douches. Do not have sex until cleared by physician)    Call Doctor right away for:    *Fever above 100.4/shaking chills  *Bright red vaginal bleeding or bleeding that soaks more than one sanitary pad per hour  *A foul smelling discharge from the vagina  *Trouble urinating or burning with urination  *Severe pain or bloating in your abdomen  *Persistent nausea/vomiting  *Redness, swelling, or drainage at your incision sites  *Chest pain/shortness of breath-call 911.    - You will be going home with prescriptions for pain medication. If you are able to take them, alternate a dose of Acetaminophen (Tylenol) and Ibuprofen (Motrin) every 3 hours. (For example, 1000mg of Tylenol at 09:00am, 600mg ibuprofen at 12:00pm, 1000mg of Tylenol at 3:00pm, 600mg ibuprofen at 6:00pm).   - Use any prescribed narcotic (ie oxycodone) for breakthrough pain as prescribed. Dispose of any unused tablets safely by returning them to the pharmacy.   - Use a stool softener, such as Miralax, to prevent constipation from the narcotic medication.   - If you are going home with or already have a prescription for estrogen cream, you may begin/resume use vaginally as prescribed nightly until your 2 week post-op visit.

## 2024-05-24 NOTE — CARE PLAN
The patient's goals for the shift include      The clinical goals for the shift include        Problem: Pain  Goal: My pain/discomfort is manageable  Outcome: Progressing

## 2024-05-24 NOTE — BRIEF OP NOTE
Date: 2024  OR Location: [facility] OR    Name: Wendy Savage, : 1955, Age: 69 y.o., MRN: [id number], Sex: female    Diagnosis  Pre-op Diagnosis     * Other urinary incontinence [N39.498]     * Cystocele with second degree uterine prolapse [N81.2] Post-op Diagnosis     * Other urinary incontinence [N39.498]     * Cystocele with second degree uterine prolapse [N81.2]     Procedures  Laparoscopic Total Hysterectomy; Bilateral Salpingectomy-Oophorectomy  47973 - [place] LAPAROSCOPY W TOTAL HYSTERECTOMY UTERUS 250 GM/<    Laparoscopic Total Hysterectomy; Bilateral Salpingectomy-Oophorectomy  27372 - [place] LAPAROSCOPY W/RMVL ADNEXAL STRUCTURES    Laparoscopic Colpopexy  90156 - [place] LAPAROSCOPY COLPOPEXY SUSPENSION VAGINAL APEX    Posterior Repairs;  Perineorrhaphy  18541 - [place] CMBND ANTERPOST COLPORRAPHY W/CYSTO    Cystoscopy  95062 - [place] CYSTOURETHROSCOPY    [place] LAPAROSCOPY COLPOPEXY SUSPENSION VAGINAL APEX [14167]  Surgeons      * Zeynep Campbell - Primary    Resident/Fellow/Other Assistant:  Surgeons and Role:  * No surgeons found with a matching role *    Procedure Summary  Anesthesia: General  ASA: II  Anesthesia Staff: Anesthesiologist: Lynette Carreon MD  C-AA: NIKO Hagan  Estimated Blood Loss: mL  Intra-op Medications:   Administrations occurring from 1030 to 1430 on 24:   Medication Name Total Dose   lactated Ringer's infusion Cannot be calculated              Anesthesia Record               Intraprocedure I/O Totals          Intake    lactated Ringer's infusion 500.00 mL    Total Intake 500 mL          Specimen:   ID Type Source Tests Collected by Time   1 : CERVIX , UTERUS, BILATERAL FALLOPIAN TUBES AND BILATERAL OVARIES Tissue UTERUS, CERVIX, FALLOPIAN TUBES AND OVARIES BILATERAL SURGICAL PATHOLOGY EXAM Zeynep Campbell MD 2024 1330   2 : VAGINAL MUCOSA Tissue VAGINAL MUCOSA SURGICAL PATHOLOGY EXAM Zeynep Campbell MD 2024 1440        Staff:   Circulator: Shameka Sawyer  Person: Yashira  Relief Circulator: [facility] Scrub: More          Finding  Specimens Collected:   ID Type Source Tests Collected by Time   1 : CERVIX , UTERUS, BILATERAL FALLOPIAN TUBES AND BILATERAL OVARIES Tissue UTERUS, CERVIX, FALLOPIAN TUBES AND OVARIES BILATERAL SURGICAL PATHOLOGY EXAM Zeynep Campbell MD 5/24/2024 1330   2 : VAGINAL MUCOSA Tissue VAGINAL MUCOSA SURGICAL PATHOLOGY EXAM Zeynep Campbell MD 5/24/2024 1440     Attending Attestation: I was present and scrubbed for the entire procedure.    Zeynep Campbell  Phone Number: [phone]

## 2024-05-24 NOTE — ANESTHESIA PROCEDURE NOTES
Airway  Date/Time: 5/24/2024 12:29 PM  Urgency: elective    Airway not difficult    Staffing  Performed: NIKO   Authorized by: Lynette Carreon MD    Performed by: NIKO Hagan  Patient location during procedure: OR    Indications and Patient Condition  Indications for airway management: anesthesia  Spontaneous ventilation: present  Preoxygenated: yes  Patient position: sniffing  MILS maintained throughout  Mask difficulty assessment: 1 - vent by mask  Planned trial extubation    Final Airway Details  Final airway type: endotracheal airway      Successful airway: ETT  Cuffed: yes   Successful intubation technique: direct laryngoscopy  Facilitating devices/methods: intubating stylet  Endotracheal tube insertion site: oral  Blade: Jacobo  Blade size: #3  ETT size (mm): 7.0  Cormack-Lehane Classification: grade I - full view of glottis  Placement verified by: chest auscultation and capnometry   Measured from: lips  ETT to lips (cm): 21  Number of attempts at approach: 1

## 2024-05-24 NOTE — CARE PLAN
The patient's goals for the shift include      The clinical goals for the shift include ambulation and pain management    Problem: Pain  Goal: My pain/discomfort is manageable  5/24/2024 1932 by Deja Novoa RN  Outcome: Progressing  5/24/2024 1832 by Deja Novoa, RN  Outcome: Progressing

## 2024-05-25 VITALS
DIASTOLIC BLOOD PRESSURE: 78 MMHG | RESPIRATION RATE: 14 BRPM | SYSTOLIC BLOOD PRESSURE: 122 MMHG | OXYGEN SATURATION: 94 % | TEMPERATURE: 98.4 F | HEART RATE: 72 BPM | WEIGHT: 138.89 LBS | BODY MASS INDEX: 26.22 KG/M2 | HEIGHT: 61 IN

## 2024-05-25 PROBLEM — N81.2 CYSTOCELE WITH SECOND DEGREE UTERINE PROLAPSE: Status: RESOLVED | Noted: 2024-04-01 | Resolved: 2024-05-25

## 2024-05-25 LAB
ERYTHROCYTE [DISTWIDTH] IN BLOOD BY AUTOMATED COUNT: 12.7 % (ref 11.5–14.5)
HCT VFR BLD AUTO: 37.2 % (ref 36–46)
HGB BLD-MCNC: 12.8 G/DL (ref 12–16)
HOLD SPECIMEN: NORMAL
MCH RBC QN AUTO: 31.7 PG (ref 26–34)
MCHC RBC AUTO-ENTMCNC: 34.4 G/DL (ref 32–36)
MCV RBC AUTO: 92 FL (ref 80–100)
NRBC BLD-RTO: 0 /100 WBCS (ref 0–0)
PLATELET # BLD AUTO: 225 X10*3/UL (ref 150–450)
RBC # BLD AUTO: 4.04 X10*6/UL (ref 4–5.2)
WBC # BLD AUTO: 11.4 X10*3/UL (ref 4.4–11.3)

## 2024-05-25 PROCEDURE — G0378 HOSPITAL OBSERVATION PER HR: HCPCS

## 2024-05-25 PROCEDURE — 7100000011 HC EXTENDED STAY RECOVERY HOURLY - NURSING UNIT

## 2024-05-25 PROCEDURE — 85027 COMPLETE CBC AUTOMATED: CPT | Performed by: STUDENT IN AN ORGANIZED HEALTH CARE EDUCATION/TRAINING PROGRAM

## 2024-05-25 PROCEDURE — 36415 COLL VENOUS BLD VENIPUNCTURE: CPT | Performed by: STUDENT IN AN ORGANIZED HEALTH CARE EDUCATION/TRAINING PROGRAM

## 2024-05-25 PROCEDURE — 2500000001 HC RX 250 WO HCPCS SELF ADMINISTERED DRUGS (ALT 637 FOR MEDICARE OP): Performed by: STUDENT IN AN ORGANIZED HEALTH CARE EDUCATION/TRAINING PROGRAM

## 2024-05-25 PROCEDURE — 2500000004 HC RX 250 GENERAL PHARMACY W/ HCPCS (ALT 636 FOR OP/ED): Performed by: STUDENT IN AN ORGANIZED HEALTH CARE EDUCATION/TRAINING PROGRAM

## 2024-05-25 RX ORDER — TRAMADOL HYDROCHLORIDE 50 MG/1
50 TABLET ORAL EVERY 8 HOURS PRN
Qty: 10 TABLET | Refills: 0 | Status: SHIPPED | OUTPATIENT
Start: 2024-05-25

## 2024-05-25 RX ADMIN — METOCLOPRAMIDE 10 MG: 10 TABLET ORAL at 08:21

## 2024-05-25 RX ADMIN — POLYETHYLENE GLYCOL 3350 17 G: 17 POWDER, FOR SOLUTION ORAL at 08:18

## 2024-05-25 RX ADMIN — ACETAMINOPHEN 975 MG: 325 TABLET ORAL at 00:26

## 2024-05-25 RX ADMIN — ACETAMINOPHEN 975 MG: 325 TABLET ORAL at 06:08

## 2024-05-25 RX ADMIN — KETOROLAC TROMETHAMINE 15 MG: 30 INJECTION, SOLUTION INTRAMUSCULAR at 08:18

## 2024-05-25 ASSESSMENT — COGNITIVE AND FUNCTIONAL STATUS - GENERAL
STANDING UP FROM CHAIR USING ARMS: A LITTLE
MOVING TO AND FROM BED TO CHAIR: A LITTLE
PERSONAL GROOMING: A LITTLE
CLIMB 3 TO 5 STEPS WITH RAILING: A LITTLE
EATING MEALS: A LITTLE
TOILETING: A LITTLE
DAILY ACTIVITIY SCORE: 18
DRESSING REGULAR LOWER BODY CLOTHING: A LITTLE
DRESSING REGULAR UPPER BODY CLOTHING: A LITTLE
MOBILITY SCORE: 18
TURNING FROM BACK TO SIDE WHILE IN FLAT BAD: A LITTLE
MOVING FROM LYING ON BACK TO SITTING ON SIDE OF FLAT BED WITH BEDRAILS: A LITTLE
WALKING IN HOSPITAL ROOM: A LITTLE
HELP NEEDED FOR BATHING: A LITTLE

## 2024-05-25 ASSESSMENT — PAIN SCALES - GENERAL
PAINLEVEL_OUTOF10: 3
PAINLEVEL_OUTOF10: 0 - NO PAIN
PAINLEVEL_OUTOF10: 2

## 2024-05-25 ASSESSMENT — PAIN - FUNCTIONAL ASSESSMENT
PAIN_FUNCTIONAL_ASSESSMENT: 0-10

## 2024-05-25 ASSESSMENT — PAIN DESCRIPTION - LOCATION
LOCATION: HEAD
LOCATION: ABDOMEN

## 2024-05-25 NOTE — NURSING NOTE

## 2024-05-25 NOTE — ANESTHESIA POSTPROCEDURE EVALUATION
Patient: Wendy Savage    Procedure Summary       Date: 05/24/24 Room / Location: [facility] OR 04 / [facility] OR    Anesthesia Start: 1220 Anesthesia Stop: 1513    Procedures:       Laparoscopic Total Hysterectomy; Bilateral Salpingectomy-Oophorectomy (Bilateral)      Laparoscopic Colpopexy      Posterior Repairs;  Perineorrhaphy      Cystoscopy Diagnosis:       Other urinary incontinence      Cystocele with second degree uterine prolapse      (Other urinary incontinence [N39.498])      (Cystocele with second degree uterine prolapse [N81.2])    Surgeons: Zeynep Campbell MD Responsible Provider: Lynette Carreon MD    Anesthesia Type: general ASA Status: 2            Anesthesia Type: general    Vitals Value Taken Time   /88 05/24/24 1731   Temp 36.7 °C (98.1 °F) 05/24/24 1700   Pulse 62 05/24/24 1734   Resp 15 05/24/24 1730   SpO2 95 % 05/24/24 1734   Vitals shown include unfiled device data.    Anesthesia Post Evaluation    Patient location during evaluation: PACU  Patient participation: complete - patient participated  Level of consciousness: awake and alert  Pain management: adequate  Airway patency: patent  Cardiovascular status: acceptable  Respiratory status: acceptable  Hydration status: acceptable  Postoperative Nausea and Vomiting: none        There were no known notable events for this encounter.

## 2024-05-25 NOTE — CARE PLAN
Problem: Pain  Goal: My pain/discomfort is manageable  5/25/2024 0905 by Deja Novoa, RN  Outcome: Progressing  5/24/2024 1932 by Deja Novoa, RN  Outcome: Progressing   The patient's goals for the shift include      The clinical goals for the shift include pt to remain safe through shift

## 2024-05-25 NOTE — PROGRESS NOTES
"TRI Savage is a 69 y.o. female on day 0 of admission presenting with S/P hysterectomy.    Subjective   POD#1 s/pTLH/USLS/BSO/Post rep/perineorr/cysto   Voided without issue for VT    Objective   Doing well this am. Pain controlled. Fletcher po.    Physical Exam  RRR  CTA B  +BS  Min VB  Incisions c/d/i  Last Recorded Vitals  Blood pressure 107/64, pulse 74, temperature 36.7 °C (98.1 °F), resp. rate 18, height 1.549 m (5' 1\"), weight 63 kg (138 lb 14.2 oz), SpO2 93%.  Intake/Output last 3 Shifts:  I/O last 3 completed shifts:  In: 1059.2 (16.8 mL/kg) [I.V.:1059.2 (16.8 mL/kg)]  Out: 550 (8.7 mL/kg) [Urine:550 (0.2 mL/kg/hr)]  Weight: 63 kg     Relevant Results              Assessment/Plan   Principal Problem:    S/P hysterectomy  Active Problems:    Absence of bladder continence    Cystocele with second degree uterine prolapse    Postoperative pain    Doing well. Home today. Start tv estrogen nightly x 6 weeks.       I spent 20 minutes in the professional and overall care of this patient.      Zeynep Campbell MD      "

## 2024-05-25 NOTE — DISCHARGE SUMMARY
Discharge Diagnosis  S/P hysterectomy    Issues Requiring Follow-Up  S/p TLH/USLS/BSO/post rep/perineorr/cysto    Test Results Pending At Discharge  Pending Labs       Order Current Status    Surgical Pathology Exam In process            Hospital Course   Did well overnight. Pain controlled. Fletcher po. Home POD#1    Pertinent Physical Exam At Time of Discharge  Physical Exam  RRR, CTA B, incisions c/d/I min VB  Home Medications     Medication List      START taking these medications     acetaminophen 500 mg tablet; Commonly known as: Tylenol; Take 2 tablets   (1,000 mg) by mouth every 6 hours if needed for mild pain (1 - 3).   oxyCODONE 5 mg immediate release tablet; Commonly known as: Roxicodone;   Take 1 tablet (5 mg) by mouth every 6 hours if needed for severe pain (7 -   10) for up to 3 days.   polyethylene glycol 17 gram packet; Commonly known as: Glycolax,   Miralax; Take 17 g by mouth once daily.   traMADol 50 mg tablet; Commonly known as: Ultram; Take 1 tablet (50 mg)   by mouth every 8 hours if needed for severe pain (7 - 10).     CHANGE how you take these medications     * ibuprofen 600 mg tablet; Take 1 tablet (600 mg) by mouth every 8 hours   if needed for moderate pain (4 - 6).; What changed: Another medication   with the same name was added. Make sure you understand how and when to   take each.   * ibuprofen 600 mg tablet; Take 1 tablet (600 mg) by mouth every 6 hours   for 7 days.; What changed: You were already taking a medication with the   same name, and this prescription was added. Make sure you understand how   and when to take each.   omeprazole 20 mg DR capsule; Commonly known as: PriLOSEC; TAKE 1 CAPSULE   Daily 30 minutes before a meal if taking ibuprofen; What changed: how much   to take, how to take this, when to take this, reasons to take this  * This list has 2 medication(s) that are the same as other medications   prescribed for you. Read the directions carefully, and ask your doctor or    other care provider to review them with you.     CONTINUE taking these medications     b complex vitamins capsule   betamethasone (augmented) 0.05 % ointment; Commonly known as: Diprolene   CALCIUM 26-VIT D3-MAGNESIUM 15 ORAL   chlorhexidine 0.12 % solution; Commonly known as: Peridex; Swish for 30   seconds and spit 15mL of solution the night before and morning of surgery   cholecalciferol 125 MCG (5000 UT) capsule; Commonly known as: Vitamin   D-3   clobetasol 0.05 % external solution; Commonly known as: Temovate   cyanocobalamin 1,000 mcg tablet; Commonly known as: Vitamin B-12   estradiol 0.01 % (0.1 mg/gram) vaginal cream; Commonly known as: Estrace   omega 3-dha-epa-fish oil 1,000 mg (120 mg-180 mg) capsule   turmeric root extract 500 mg capsule   ZINC PICOLINATE (BULK) MISC       Outpatient Follow-Up  Future Appointments   Date Time Provider Department Center   6/11/2024  9:30 AM RACHANA Burnett-CNP [id number]   7/8/2024  3:45 PM Zeynep Campbell MD [id number] [facility]   8/5/2024  9:15 AM Maxine Fernández MD [id number] [place]       Zeynep Campbell MD

## 2024-05-26 ENCOUNTER — TELEPHONE (OUTPATIENT)
Dept: OBSTETRICS AND GYNECOLOGY | Facility: CLINIC | Age: 69
End: 2024-05-26
Payer: MEDICARE

## 2024-05-26 NOTE — TELEPHONE ENCOUNTER
"Call to patient for follow up from surgery 5/24 with Dr Campbell. Patient states that she is \"doing well\". Her only complaint, is that she is extremely tired. Instructed to drink more water/ fluids. She said that she is drinking tea. She is taking tylenol and motrin for pain; no oxycodone. She said that she did take a tramadol yesterday. She has had a bowel movement. She has no other concerns.   "

## 2024-05-30 ENCOUNTER — TELEPHONE (OUTPATIENT)
Dept: PRIMARY CARE | Facility: CLINIC | Age: 69
End: 2024-05-30
Payer: MEDICARE

## 2024-05-30 NOTE — TELEPHONE ENCOUNTER
Dr. Fernández patient    Pt had a hysterectomy and her EKG during pre op testing showed some abnormalities and they suggested she follow up with Cardiology. Her EKG is in her chart. Please advise.

## 2024-06-04 LAB
LABORATORY COMMENT REPORT: NORMAL
PATH REPORT.FINAL DX SPEC: NORMAL
PATH REPORT.GROSS SPEC: NORMAL
PATH REPORT.RELEVANT HX SPEC: NORMAL
PATH REPORT.TOTAL CANCER: NORMAL

## 2024-06-05 ENCOUNTER — TELEPHONE (OUTPATIENT)
Dept: OBSTETRICS AND GYNECOLOGY | Facility: CLINIC | Age: 69
End: 2024-06-05
Payer: MEDICARE

## 2024-06-05 NOTE — TELEPHONE ENCOUNTER
"Result Communication    Resulted Orders   Surgical Pathology Exam   Result Value Ref Range    Case Report       Surgical Pathology                                Case: [id number]                                  Authorizing Provider:  Zeynep Campbell MD     Collected:           05/24/2024 1330              Ordering Location:     [facility] OR Received:            05/24/2024 1549              Pathologist:           Araceli Hager MD                                                           Specimens:   A) - UTERUS, CERVIX, FALLOPIAN TUBES AND OVARIES BILATERAL, CERVIX , UTERUS,                        BILATERAL FALLOPIAN TUBES AND BILATERAL OVARIES                                                     B) - VAGINAL MUCOSA                                                                        FINAL DIAGNOSIS       A. UTERUS, CERVIX, BILATERAL FALLOPIAN TUBES AND OVARIES, HYSTERECTOMY AND BILATERAL SALPINGO-OOPHORECTOMY:   -- Proliferative pattern endometrium   -- Myometrium with no pathologic diagnosis  -- Cervix with hyperkeratosis and parakeratosis  -- Serous cystadenoma of right ovary; left ovary with cortical inclusion cysts  -- Right fallopian tube with paratubal cysts; left fallopian tube with salpingitis isthmica nodosum    B. VAGINAL MUCOSA, EXCISION:   --Squamous mucosa with no pathologic diagnosis              By the signature on this report, the individual or group listed as making the Final Interpretation/Diagnosis certifies that they have reviewed this case.       Clinical History       Pre-op diagnosis:  Other urinary incontinence [N39.498]  Cystocele with second degree uterine prolapse [N81.2]      Gross Description       A: Received in formalin, labeled with the patient's name and hospital number and \"cervix, uterus, bilateral\", is a uterus with attached cervix, fallopian tubes, and ovaries.  The uterus is received intact, however the fundus is not present. The uterus, without the " "fallopian tubes and ovaries, weighs 25 g, and measures 2.7 cm (cornu to cornu) x 6.0 cm (\"fundus\" to cervix) x 1.8 cm (anterior to posterior). The serosa is tan-red, smooth, and glistening.  The cervix measures 2.8 cm from 12:00 to 6:00 and 2.9 cm from 9:00 to 3:00, and the cervical os is slit-like, 0.7 cm.  The ectocervix is pink-tan, smooth, and glistening.  The endocervical canal measures 1.7 cm in length and is unremarkable.  The endometrial cavity is 3.4 cm in length and 1.1 cm in width.  The endometrium is tan-red, hemorrhagic, and velvety and measures up to 0.1 cm in greatest thickness.  No gross lesions are identified.  The myometrium is pink, rubbery and measures up to 7 cm in greatest thickness.  No gross lesions are identified.      The right fallopian tube measures 6.3 cm in length by 0.5 cm in diameter.  The serosa is pink-tan smooth, and glistening.  Paratubal cysts are present, up to 2.4 cm.  The fimbriated end is unremarkable.  The cut surface reveals a patent lumen.  The right ovary, 4.8 x 2.9 x 2.6 cm, is cystic with a smooth outer surface, and is inked black. The right ovary weighs 24 g. The cut surface reveals 2 thin walled cysts filled with clear fluid measuring 2.5 cm and 1.8 cm in greatest dimension.  The wall is 0.1 cm, and smooth.  Papillary excrescences are not present.  Normal-appearing ovary is not present.    The left fallopian tube measures 6.3 cm in length by 0.5 cm in diameter. The serosa is pink-tan smooth, and glistening.  Paratubal cysts are present, up to 0.2 cm. An appendage is present, 2.0 cm in length.  The fimbriated end is unremarkable.  The cut surface reveals a patent lumen.  The left ovary, 2.3 x 1.2 x 0.8 cm, is firm with a white cerebriform outer surface.  The cut surface is tan-white and yellow. No gross lesions are identified.  Photographs have been taken. Representative sections are submitted in 13 cassettes.   O    Summary of " "Cassettes:  Specimen Label Site  A  1 cervix 12:00    2 cervix 6:00    3 anterior uterine wall, full-thickness    4 posterior uterine wall, full-thickness    5-6 right fallopian tube (entire fimbriated end and representative cross-sections)    7-8 right ovary, larger cyst (representative sections)    9 right ovary, smaller cyst (representative sections)    10-12 left fallopian tube (entire fimbriated end and representative cross-sections)    13 left ovary (representative sections)    Gross dissection performed at:  [facility]  Department of Pathology  [address] [place] [address]  Phone: [phone] Fax: [phone]     B: Received in formalin, labeled with the patient's name and hospital number and \"vaginal mucosa\", are 2 fragments of mucosa, aggregating to 3.4 x 1.5 x 0.4 cm.  The mucosal surface is unremarkable.  The specimen is sectioned.  Representative sections are submitted in one cassette.   DJO    Gross dissection performed at:  [facility]  Department of Pathology  [address] [place] [address]  Phone: [phone] Fax: [phone]           11:36 AM      Results were successfully communicated with the patient and they acknowledged their understanding.    "

## 2024-06-05 NOTE — TELEPHONE ENCOUNTER
----- Message from Zeynep Campbell MD sent at 6/5/2024 10:55 AM EDT -----  Please notify pt of benign pathology from surgery

## 2024-06-10 DIAGNOSIS — R94.31 ABNORMAL ECG: Primary | ICD-10-CM

## 2024-06-10 DIAGNOSIS — E78.00 ELEVATED LDL CHOLESTEROL LEVEL: ICD-10-CM

## 2024-06-10 NOTE — PROGRESS NOTES
Ecg with minor change since 2021 (lateral infarct possible). Negative stress test 2021.  Ct cardiac score zero June 2019 .  This was a  pre op ecg and pt proceeded with her surgery.  Will order ct for cac and refer cardiology.

## 2024-06-11 ENCOUNTER — OFFICE VISIT (OUTPATIENT)
Dept: OBSTETRICS AND GYNECOLOGY | Facility: CLINIC | Age: 69
End: 2024-06-11
Payer: MEDICARE

## 2024-06-11 VITALS
HEART RATE: 78 BPM | BODY MASS INDEX: 26.66 KG/M2 | DIASTOLIC BLOOD PRESSURE: 70 MMHG | HEIGHT: 61 IN | SYSTOLIC BLOOD PRESSURE: 113 MMHG | WEIGHT: 141.2 LBS

## 2024-06-11 DIAGNOSIS — Z98.890 POST-OPERATIVE STATE: Primary | ICD-10-CM

## 2024-06-11 DIAGNOSIS — N95.2 VAGINAL ATROPHY: ICD-10-CM

## 2024-06-11 DIAGNOSIS — N32.81 OVERACTIVE BLADDER: ICD-10-CM

## 2024-06-11 DIAGNOSIS — N81.4 UTEROVAGINAL PROLAPSE: ICD-10-CM

## 2024-06-11 LAB
POC APPEARANCE, URINE: CLEAR
POC BILIRUBIN, URINE: NEGATIVE
POC BLOOD, URINE: ABNORMAL
POC COLOR, URINE: YELLOW
POC GLUCOSE, URINE: NEGATIVE MG/DL
POC KETONES, URINE: NEGATIVE MG/DL
POC LEUKOCYTES, URINE: ABNORMAL
POC NITRITE,URINE: NEGATIVE
POC PH, URINE: 6 PH
POC PROTEIN, URINE: NEGATIVE MG/DL
POC SPECIFIC GRAVITY, URINE: 1.02
POC UROBILINOGEN, URINE: 0.2 EU/DL

## 2024-06-11 PROCEDURE — 1159F MED LIST DOCD IN RCRD: CPT | Performed by: NURSE PRACTITIONER

## 2024-06-11 PROCEDURE — 99024 POSTOP FOLLOW-UP VISIT: CPT | Performed by: NURSE PRACTITIONER

## 2024-06-11 PROCEDURE — 81003 URINALYSIS AUTO W/O SCOPE: CPT | Mod: QW | Performed by: NURSE PRACTITIONER

## 2024-06-11 RX ORDER — ESTRADIOL 0.1 MG/G
1 CREAM VAGINAL 2 TIMES WEEKLY
Qty: 42.5 G | Refills: 3 | Status: SHIPPED | OUTPATIENT
Start: 2024-06-13 | End: 2025-06-13

## 2024-06-11 NOTE — PROGRESS NOTES
"Subjective   TRI Savage is a 69 y.o. female who presents to the clinic 2 weeks status post  TLH, BSO, colpopexy, ID, perineorrhaphy and cystoscopy for  cystocele with uterine descent . Eating a regular diet without difficulty. Bowel movements are normal. Pain is controlled without any medications.    Post-Operative Symptoms:  - She reports feeling well overall.  - She is eager to know when she can resume aerobic activities as she is feeling restless.  - She denies any issues with nausea, bowel movements, urination, or bleeding.  - She mentons that the bandages and glue from the surgery have naturally fallen off, and she is concerned about whether she might have tugged at anything.  - She inquires about the presence of vaginal stitches and whether they will dissolve over time.    GYN Symptoms:  - She has been using estrogen cream daily.  - She requests an estrogen cream refill as her current Rx has .  - She used the estrogen cream 2x/week prior to the surgery due to a caruncle.  - She is curious about the necessity of pelvic floor exercises at this point and mentions that she couldn't do them before surgery.    Objective   /70 (BP Location: Left arm, Patient Position: Sitting, BP Cuff Size: Adult)   Pulse 78   Ht 1.549 m (5' 1\")   Wt 64 kg (141 lb 3.2 oz)   BMI 26.68 kg/m²   General:  alert and oriented, in no acute distress   Abdomen: soft, bowel sounds active, non-tender   Incision:   healing well, no drainage, no erythema, no hernia, no seroma, no swelling, no dehiscence, incision well approximated       Assessment/Plan    Doing well postoperatively.  Operative findings again reviewed. Pathology report discussed.    1. Continue any current medications.  2. Wound care discussed.  3. Activity restrictions: no lifting more than 10 pounds and walking and using a recumbent bike at a slower pace are permitted. The patient was advised to avoid high-impact activities and exercises that put pressure on " the abdomen.      Diagnoses:  #1 Post-operative follow-up  - She is healing well post-surgery.  - No heavy lifting for the next 4 weeks.  - Walking and using a recumbent bike at a slower pace are permitted.  - Avoid high-impact activities and exercises that put pressure on the abdomen.  - No sexual activity until further notice.  - Rx for estrogen cream sent to the patient's preferred pharmacy, to be used 3x/week as healing progresses.  - PFPT is recommended if she experiences bladder issues, pain, or discomfort during intercourse.  - Advised her to be cautious and not overdo activities despite feeling well.  - Informed about the nature of permanent and dissolvable sutures.    Follow-up with Dr. Campbell on July 8 for further evaluation and potential release for more activities.    Scribe Attestation  By signing my name below, Neymar LEONE Scribe, attest that this documentation has been prepared under the direction and in the presence of CLYDE Burnett on 06/11/2024 at 10:52 AM.  I, CLYDE Burnett, personally performed the services described in this documentation which was scribed virtually and I confirm that it is both accurate and complete.

## 2024-06-13 PROBLEM — G56.00 CARPAL TUNNEL SYNDROME: Status: ACTIVE | Noted: 2024-06-13

## 2024-06-13 PROBLEM — I77.810 ASCENDING AORTA DILATATION (CMS-HCC): Status: ACTIVE | Noted: 2024-06-13

## 2024-06-13 NOTE — TELEPHONE ENCOUNTER
"Vaishnavi was updated on Dr Fernández's recommendations, she was already aware because of the notifications when orders placed. She already set-up the appointments; 6/26/24 with Cardio and cardiac scoring is on 7/5/24.  She also stated \"she is having no symptoms, no chest pain or SOB\"  "

## 2024-06-26 ENCOUNTER — APPOINTMENT (OUTPATIENT)
Dept: CARDIOLOGY | Facility: CLINIC | Age: 69
End: 2024-06-26
Payer: MEDICARE

## 2024-06-27 ENCOUNTER — TELEPHONE (OUTPATIENT)
Dept: PRIMARY CARE | Facility: CLINIC | Age: 69
End: 2024-06-27
Payer: MEDICARE

## 2024-06-27 NOTE — TELEPHONE ENCOUNTER
Pt is scheduled to have a consult with a cardiologist and was told she should have her cardiac scoring test done before hand. Is this true?

## 2024-07-05 ENCOUNTER — HOSPITAL ENCOUNTER (OUTPATIENT)
Dept: RADIOLOGY | Facility: CLINIC | Age: 69
Discharge: HOME | End: 2024-07-05
Payer: MEDICARE

## 2024-07-05 DIAGNOSIS — E78.00 ELEVATED LDL CHOLESTEROL LEVEL: ICD-10-CM

## 2024-07-05 DIAGNOSIS — R94.31 ABNORMAL ECG: ICD-10-CM

## 2024-07-05 PROCEDURE — 75571 CT HRT W/O DYE W/CA TEST: CPT

## 2024-07-08 ENCOUNTER — OFFICE VISIT (OUTPATIENT)
Dept: OBSTETRICS AND GYNECOLOGY | Facility: CLINIC | Age: 69
End: 2024-07-08
Payer: MEDICARE

## 2024-07-08 VITALS
HEIGHT: 61 IN | HEART RATE: 73 BPM | BODY MASS INDEX: 27 KG/M2 | WEIGHT: 143 LBS | DIASTOLIC BLOOD PRESSURE: 78 MMHG | SYSTOLIC BLOOD PRESSURE: 122 MMHG

## 2024-07-08 DIAGNOSIS — N39.0 ACUTE LOWER UTI: Primary | ICD-10-CM

## 2024-07-08 DIAGNOSIS — Z12.31 VISIT FOR SCREENING MAMMOGRAM: ICD-10-CM

## 2024-07-08 LAB
POC APPEARANCE, URINE: CLEAR
POC BILIRUBIN, URINE: NEGATIVE
POC BLOOD, URINE: ABNORMAL
POC COLOR, URINE: YELLOW
POC GLUCOSE, URINE: NEGATIVE MG/DL
POC KETONES, URINE: NEGATIVE MG/DL
POC LEUKOCYTES, URINE: NEGATIVE
POC NITRITE,URINE: NEGATIVE
POC PH, URINE: 5.5 PH
POC PROTEIN, URINE: NEGATIVE MG/DL
POC SPECIFIC GRAVITY, URINE: 1.02
POC UROBILINOGEN, URINE: 0.2 EU/DL

## 2024-07-08 PROCEDURE — 1159F MED LIST DOCD IN RCRD: CPT | Performed by: OBSTETRICS & GYNECOLOGY

## 2024-07-08 PROCEDURE — 81003 URINALYSIS AUTO W/O SCOPE: CPT | Performed by: OBSTETRICS & GYNECOLOGY

## 2024-07-08 PROCEDURE — 99211 OFF/OP EST MAY X REQ PHY/QHP: CPT | Performed by: OBSTETRICS & GYNECOLOGY

## 2024-07-08 PROCEDURE — 1036F TOBACCO NON-USER: CPT | Performed by: OBSTETRICS & GYNECOLOGY

## 2024-07-08 PROCEDURE — 1126F AMNT PAIN NOTED NONE PRSNT: CPT | Performed by: OBSTETRICS & GYNECOLOGY

## 2024-07-08 ASSESSMENT — ENCOUNTER SYMPTOMS
ENDOCRINE NEGATIVE: 1
PSYCHIATRIC NEGATIVE: 1
CONSTITUTIONAL NEGATIVE: 1
MUSCULOSKELETAL NEGATIVE: 1
GASTROINTESTINAL NEGATIVE: 1
EYES NEGATIVE: 1
RESPIRATORY NEGATIVE: 1
NEUROLOGICAL NEGATIVE: 1
CARDIOVASCULAR NEGATIVE: 1

## 2024-07-08 ASSESSMENT — PAIN SCALES - GENERAL: PAINLEVEL: 0-NO PAIN

## 2024-07-08 NOTE — PROGRESS NOTES
Urogynecology  Provider:  Zeynep Campbell MD  [phone]              ASSESSMENT AND PLAN:   69-year-old female being assessed for her 6-week post-op visit s/p laparoscopic total hysterectomy, BSO, salpingectomy-oophorectomy, laparoscopic colpopexy, posterior repair, perineorrhaphy, and cystoscopy on 5/24/2024.     Diagnoses:  #1 Cystocele (resolved)   #2 Post-op  #3 Mammogram    Plan:  Cystocele, post-op  - She is recovering well from her recent TLH, BSO, colpopexy, and posterior repair.  - No evidence of POP or ALEX by clinical exam and subjectively per the patient.  - The surgical sites are healing appropriately.  - Continue pelvic rest for 2 more weeks, no intercourse to avoid disturbing the intact V-lock sutures.  - She is currently using estrogen cream nightly; instructed to reduce usage to 2 nights/week.  - Upon exam, speculum exam shows the vaginal cuff is intact with V-lock sutures still in place. No signs of infection. Bimanual exam confirms the intact cuff.    2. Mammogram  - She is due for a mammogram in August.  - Bilateral mammogram with tomosynthesis requisition sent today and this order expires by 9/8/2025.    Follow-up in 2 months for final post-op check with Dr. Campbell.    Scribe Attestation  By signing my name below, INeymar Scribe, attest that this documentation has been prepared under the direction and in the presence of Zeynep Campbell MD on 07/08/2024 at 4:55 PM.    Agree with above. I Dr. Campbell, personally performed the services described in the documentation which was scribed virtually and confirm it is both complete and accurate.  Zeynep Campbell MD      Problem List Items Addressed This Visit    None  Visit Diagnoses       Acute lower UTI    -  Primary    Relevant Orders    POCT UA Automated manually resulted (Completed)    Visit for screening mammogram        Relevant Orders    BI mammo bilateral screening tomosynthesis                I spent a total of eConsult  Time: 23 minutes in face to face and non face to face time.        Zeynep Campbell MD        HISTORY OF PRESENT ILLNESS:  69-year-old female being assessed for her 6-week post-op visit s/p laparoscopic total hysterectomy, BSO, salpingectomy-oophorectomy, laparoscopic colpopexy, posterior repair, perineorrhaphy, and cystoscopy on 2024.      2024 12:48 PM Laparoscopic Total Hysterectomy; Bilateral Salpingectomy-Oophorectomy   Laparoscopic Colpopexy   Posterior Repairs;  Perineorrhaphy   Cystoscopy        Here for 6 week postop check         Urinary Symptoms:   - UA showed trace of blood and white blood cells, but she has no sx of concern.    Post-op Symptoms:   - She reports feeling great and has resumed lifting her grandson.  - She is using TV estrogen cream.  - She has no current urinary complaints.       Past Medical History:      Past Medical History:   Diagnosis Date    Anxiety disorder, unspecified     pt denies    Aortic valve regurgitation     mild on ECHO  and mild on repeat ECHO 2023    B12 deficiency     Chest pain      - stress test no ischemia    Closed fracture of pelvis (Multi) 2016    Cystocele with second degree uterine prolapse     GERD (gastroesophageal reflux disease)     Hepatic cyst     incidentalally found on CT calcium score , gallbladder U/S :LIVER:  The right hepatic lobe measures 17.0 cm in length. Normal  echogenicity and contour.  Multiple cysts are present, measuring up  to 1.6 cm in the left hepatic lobe and 1.1 cm in the right hepatic  lobe.    Hiatal hernia     Hyperlipidemia     Osteopenia     Osteoporosis     Prediabetes     12.1.23: A1C 5.9%    Psoriasis     Vitamin D deficiency     Vitamin D deficiency, unspecified           Past Surgical History:       Past Surgical History:   Procedure Laterality Date     SECTION, CLASSIC  2013     Section    COLONOSCOPY  2013    Colonoscopy (Fiberoptic)    DILATION AND CURETTAGE  OF UTERUS  03/01/2013    Dilation And Curettage    HYSTEROSCOPY      LAPAROSCOPIC HYSTERECTOMY N/A 05/24/2024    tlh, bso, usls, posterior repair/perineorrhaphy    OTHER SURGICAL HISTORY  03/01/2013    Cervix Cryosurgery    OTHER SURGICAL HISTORY  10/12/2021    Ulnar collateral ligament of elbow reconstruction    OTHER SURGICAL HISTORY  10/12/2021    Metacarpophalangeal joint arthroplasty    OTHER SURGICAL HISTORY      urthethra carbuncle excision    ROTATOR CUFF REPAIR Right     SINUS SURGERY  03/01/2013    Sinus Surgery    SYNOVECTOMY      WRIST SURGERY Right 10/12/2021    Wrist arthroscopy/CTR/Ulnar collateral ligament of thumb rupture repair         Medications:       Prior to Admission medications    Medication Sig Start Date End Date Taking? Authorizing Provider   acetaminophen (Tylenol) 500 mg tablet Take 2 tablets (1,000 mg) by mouth every 6 hours if needed for mild pain (1 - 3). 5/24/24   Katherine Mckeon MD   b complex vitamins capsule Take 1 capsule by mouth once daily.    Historical Provider, MD   betamethasone, augmented, (Diprolene) 0.05 % ointment Apply to the affected areas of the body twice a day for 3 weeks, then take 1 week off. Repeat as needed for flares. 1/18/24   Historical Provider, MD   CALCIUM 26-VIT D3-MAGNESIUM 15 ORAL Take 1 capsule by mouth once daily. 7/21/21   Historical Provider, MD   cholecalciferol (Vitamin D-3) 125 MCG (5000 UT) capsule Take by mouth. 4/19/17   Historical Provider, MD   clobetasol (Temovate) 0.05 % external solution Apply to the affected areas of the scalp every night for 3 weeks, then take 1 week off. Repeat as needed for flares. 1/18/24   Historical Provider, MD   cyanocobalamin (Vitamin B-12) 1,000 mcg tablet Take 1 tablet (1,000 mcg) by mouth once daily.    Historical Provider, MD   estradiol (Estrace) 0.01 % (0.1 mg/gram) vaginal cream Insert 0.25 Applicatorfuls (1 g) into the vagina 2 times a week. 6/13/24 6/13/25  RACHANA Burnett-CNP   ibuprofen  "600 mg tablet Take 1 tablet (600 mg) by mouth every 8 hours if needed for moderate pain (4 - 6). 1/23/24   Maxine Fernández MD   omega 3-dha-epa-fish oil 1,000 mg (120 mg-180 mg) capsule Take by mouth.    Historical Provider, MD   omeprazole (PriLOSEC) 20 mg DR capsule TAKE 1 CAPSULE Daily 30 minutes before a meal if taking ibuprofen  Patient taking differently: Take 1 capsule (20 mg) by mouth if needed. TAKE 1 CAPSULE Daily 30 minutes before a meal if taking ibuprofen 1/23/24   Maxine Fernández MD   turmeric root extract 500 mg capsule Take 1 capsule by mouth once daily. 7/21/21   Historical Provider, MD   ZINC PICOLINATE, BULK, MISC Take 50 mg by mouth if needed.    Historical Provider, MD LINDSAY  Review of Systems   Constitutional: Negative.    HENT: Negative.     Eyes: Negative.    Respiratory: Negative.     Cardiovascular: Negative.    Gastrointestinal: Negative.    Endocrine: Negative.    Genitourinary: Negative.    Musculoskeletal: Negative.    Neurological: Negative.    Psychiatric/Behavioral: Negative.          Blood, Urine   Date Value Ref Range Status   04/26/2024 NEGATIVE NEGATIVE Final     POC Blood, Urine   Date Value Ref Range Status   07/08/2024 TRACE-Intact (A) NEGATIVE Final     Poc Nitrite, Urine   Date Value Ref Range Status   07/08/2024 NEGATIVE NEGATIVE Final     Urobilinogen, Urine   Date Value Ref Range Status   04/26/2024 0.2 0.2, 1.0 mg/dL Final     POC Urobilinogen, Urine   Date Value Ref Range Status   07/08/2024 0.2 0.2, 1.0 EU/DL Final     POC Leukocytes, Urine   Date Value Ref Range Status   07/08/2024 NEGATIVE NEGATIVE Final         PHYSICAL EXAM:      /78   Pulse 73   Ht 1.549 m (5' 1\")   Wt 64.9 kg (143 lb)   BMI 27.02 kg/m²      No LMP recorded. Patient is postmenopausal.      Declines chaperone for physical exam.      Well developed, well nourished, in no apparent distress.   Neurologic/Psychiatric:  Awake, Alert and Oriented times 3.  Affect normal. " "    GENITAL/URINARY:       External Genitalia:  The patient has normal appearing external genitalia, normal skenes and bartholins glands, and a normal hair distribution.  Her vulva is without lesions, erythema or discharge.  It is non-tender with appropriate sensation.     Urethral Meatus:  Size normal, Location normal, Lesions absent, Prolapse absent,      Urethra:  Fullness absent, Masses absent,      Bladder:  Fullness absent, Masses absent, Tenderness absent,      Vagina:  General appearance normal, Estrogen effect normal, Discharge absent, Lesions absent  V-lock sutures present at apex of vagina. Cuff healing.    Cervix: absent.   Uterus:  surgically absent  Adnexa: normal           Data and DIAGNOSTIC STUDIES REVIEWED   No results found.   No results found for: \"URINECULTURE\", \"UAMICCOMM\"   Lab Results   Component Value Date    GLUCOSE 77 05/10/2024    CALCIUM 8.7 05/10/2024     05/10/2024    K 4.7 05/10/2024    CO2 31 05/10/2024     05/10/2024    BUN 18 05/10/2024    CREATININE 0.71 05/10/2024     Lab Results   Component Value Date    WBC 11.4 (H) 05/25/2024    HGB 12.8 05/25/2024    HCT 37.2 05/25/2024    MCV 92 05/25/2024     05/25/2024          Neymar Page        "

## 2024-07-17 ENCOUNTER — APPOINTMENT (OUTPATIENT)
Dept: CARDIOLOGY | Facility: CLINIC | Age: 69
End: 2024-07-17
Payer: MEDICARE

## 2024-07-17 VITALS
OXYGEN SATURATION: 96 % | BODY MASS INDEX: 26.46 KG/M2 | HEIGHT: 62 IN | HEART RATE: 63 BPM | WEIGHT: 143.8 LBS | DIASTOLIC BLOOD PRESSURE: 82 MMHG | SYSTOLIC BLOOD PRESSURE: 120 MMHG

## 2024-07-17 DIAGNOSIS — I77.810 ASCENDING AORTA DILATATION (CMS-HCC): ICD-10-CM

## 2024-07-17 DIAGNOSIS — R93.1 AGATSTON CORONARY ARTERY CALCIUM SCORE LESS THAN 100: ICD-10-CM

## 2024-07-17 DIAGNOSIS — E78.00 ELEVATED LDL CHOLESTEROL LEVEL: Primary | ICD-10-CM

## 2024-07-17 DIAGNOSIS — I35.1 NONRHEUMATIC AORTIC VALVE INSUFFICIENCY: ICD-10-CM

## 2024-07-17 DIAGNOSIS — R94.31 ABNORMAL ECG: ICD-10-CM

## 2024-07-17 PROCEDURE — 1036F TOBACCO NON-USER: CPT | Performed by: STUDENT IN AN ORGANIZED HEALTH CARE EDUCATION/TRAINING PROGRAM

## 2024-07-17 PROCEDURE — 99203 OFFICE O/P NEW LOW 30 MIN: CPT | Performed by: STUDENT IN AN ORGANIZED HEALTH CARE EDUCATION/TRAINING PROGRAM

## 2024-07-17 PROCEDURE — 1159F MED LIST DOCD IN RCRD: CPT | Performed by: STUDENT IN AN ORGANIZED HEALTH CARE EDUCATION/TRAINING PROGRAM

## 2024-07-17 PROCEDURE — 3008F BODY MASS INDEX DOCD: CPT | Performed by: STUDENT IN AN ORGANIZED HEALTH CARE EDUCATION/TRAINING PROGRAM

## 2024-07-17 NOTE — PROGRESS NOTES
Referred by Dr. Fernández for AV insufficiency and Abnormal ECG (Referred for abnormal EKG(block))     History Of Present Illness:    TRI Savage is a 69 y.o. female who presents to clinic for evaluation.  She recent underwent  EKG which showed present sinus rhythm with left anterior fascicular block.  This is slightly different than from 1 year prior.  At baseline patient is asymptomatic denies chest pain or shortness of breath.  She had echocardiogram this year showed normal ejection fraction grade 1 diastolic dysfunction and mild aortic valve regurgitation with normal RVSP.  She had a previous stress test from  which showed exceptional functional capacity as patient was able to exercise for 10 minutes achieving 10 METS and 96% of age target heart rate without evidence of ischemia by EKG.  Patient had a recent calcium score which came back at 9.77 placing her in a low risk for cardiovascular events.  Notably patient is fairly functional at baseline she exercises very often.  Her most recent cholesterol shows an LDL of 115.  Patient is not interested in initiating pharmacotherapy.      Past Medical History:  She has a past medical history of Anxiety disorder, unspecified, Aortic valve regurgitation, B12 deficiency, Chest pain, Closed fracture of pelvis (Multi) (2016), Cystocele with second degree uterine prolapse, GERD (gastroesophageal reflux disease), Hepatic cyst, Hiatal hernia, Hyperlipidemia, Osteopenia, Osteoporosis, Prediabetes, Psoriasis, Vitamin D deficiency, and Vitamin D deficiency, unspecified.    Past Surgical History:  She has a past surgical history that includes Colonoscopy (2013); Other surgical history (2013); Dilation and curettage of uterus (2013); Sinus surgery (2013); Hysteroscopy;  section, classic (2013); Other surgical history (10/12/2021); Wrist surgery (Right, 10/12/2021); Synovectomy; Other surgical history (10/12/2021); Other surgical  history; Rotator cuff repair (Right); and Laparoscopic hysterectomy (N/A, 2024).      Social History:  She reports that she has never smoked. She has never used smokeless tobacco. She reports that she does not currently use alcohol after a past usage of about 6.0 standard drinks of alcohol per week. She reports that she does not use drugs.    Family History:  Family History   Problem Relation Name Age of Onset    Atrial fibrillation Mother           age 91    Hypertension Mother      Dementia Mother      Other (pacemaker) Father           age 82    Heart attack Father  82    Other (arthritis hip) Brother      No Known Problems Brother      Diabetes type II Paternal Grandmother      Breast cancer Cousin          Allergies:  Patient has no known allergies.    Outpatient Medications:  Current Outpatient Medications   Medication Instructions    acetaminophen (TYLENOL) 1,000 mg, oral, Every 6 hours PRN    b complex vitamins capsule 1 capsule, oral, Daily RT    betamethasone, augmented, (Diprolene) 0.05 % ointment Apply to the affected areas of the body twice a day for 3 weeks, then take 1 week off. Repeat as needed for flares.    CALCIUM 26-VIT D3-MAGNESIUM 15 ORAL 1 capsule, oral, Daily RT    cholecalciferol (Vitamin D-3) 125 MCG (5000 UT) capsule oral    clobetasol (Temovate) 0.05 % external solution Apply to the affected areas of the scalp every night for 3 weeks, then take 1 week off. Repeat as needed for flares.    cyanocobalamin (Vitamin B-12) 1,000 mcg tablet 1 tablet, oral, Daily    estradiol (ESTRACE) 1 g, vaginal, 2 times weekly    ibuprofen 600 mg, oral, Every 8 hours PRN    omega 3-dha-epa-fish oil 1,000 mg (120 mg-180 mg) capsule oral    omeprazole (PriLOSEC) 20 mg DR capsule TAKE 1 CAPSULE Daily 30 minutes before a meal if taking ibuprofen    turmeric root extract 500 mg capsule 1 capsule, oral, Daily    ZINC PICOLINATE, BULK, MISC 50 mg, oral, As needed        Last Recorded Vitals:  Vitals:  "   07/17/24 1003   BP: 120/82   BP Location: Left arm   Patient Position: Sitting   BP Cuff Size: Adult   Pulse: 63   SpO2: 96%   Weight: 65.2 kg (143 lb 12.8 oz)   Height: 1.562 m (5' 1.5\")       Physical Exam:  General: No acute distress,  A&O x3  Skin: Warm and dry  Neck: JVD is not elevated  ENT: Moist mucous membranes no lesions appreciated  Pulmonary: CTAB  Cards: Regular rate rhythm, no murmurs gallops or rubs appreciated normal S1-S2  Abdomen: Soft nontender nondistended  Extremities: No edema or cyanosis  Psych: Appropriate mood and affect          Last Labs:  CBC -  Lab Results   Component Value Date    WBC 11.4 (H) 05/25/2024    HGB 12.8 05/25/2024    HCT 37.2 05/25/2024    MCV 92 05/25/2024     05/25/2024       CMP -  Lab Results   Component Value Date    CALCIUM 8.7 05/10/2024    PROT 6.6 04/26/2024    ALBUMIN 4.1 04/26/2024    AST 13 04/26/2024    ALT 13 04/26/2024    ALKPHOS 64 04/26/2024    BILITOT 0.4 04/26/2024       LIPID PANEL -   Lab Results   Component Value Date    CHOL 189 05/19/2023    TRIG 71 05/19/2023    HDL 60.0 05/19/2023    CHHDL 3.2 05/19/2023    LDLF 115 (H) 05/19/2023    VLDL 14 05/19/2023       RENAL FUNCTION PANEL -   Lab Results   Component Value Date    GLUCOSE 77 05/10/2024     05/10/2024    K 4.7 05/10/2024     05/10/2024    CO2 31 05/10/2024    ANIONGAP 11 05/10/2024    BUN 18 05/10/2024    CREATININE 0.71 05/10/2024    CALCIUM 8.7 05/10/2024    ALBUMIN 4.1 04/26/2024        Lab Results   Component Value Date    HGBA1C 5.9 (H) 12/01/2023       Last Cardiology Tests:  ECG:  ECG 12 Lead 05/10/2024    Assessment/Plan     1.  Abnormal EKG: Sinus rhythm with left anterior fascicular block.  Slight progression as compared to previous ECG from 1 year ago.  Patient is asymptomatic at baseline.  Echo reviewed showed normal ejection fraction without significant valvular pathology with mild AI and grade 1 diastolic function.  She has a calcium score of 9.77.  Previous " stress test from 2021 showing exceptional functional capacity but evidence of ischemia by EKG or perfusion.  No additional workup is indicated at this time.  -Recommend repeat EKG within 1 year to monitor for progression    2.  Subclinical CAD: Calcium score 9.77.  Asymptomatic.  Baseline ECG as above.  Appropriate functional capacity.  Discussed risk and benefits of initiation of statin therapy. .  Patient declined and like to focus on diet and exercise.    3.  Mild aortic valve regurgitation: Normal ejection fraction.  Consider repeat echocardiogram in 3 to 5 years as per guidelines.    4.  Mildly ectatic ascending thoracic aorta: Appreciated with recent calcium score.  Measures 3.7 cm as  Number.    5.  Hyperlipidemia: .  Patient declined pharmacotherapy.  Continue to focus on heart healthy diet and exercise.    Repeat EKG in 1 year  Follow-up as needed.    (This note was generated with voice recognition software and may contain errors including spelling, grammar, syntax and missed recognition of what was dictated, of which may not have been fully corrected)       Yeison Serrano MD PhD

## 2024-07-23 ENCOUNTER — TELEPHONE (OUTPATIENT)
Dept: PRIMARY CARE | Facility: CLINIC | Age: 69
End: 2024-07-23
Payer: MEDICARE

## 2024-07-23 DIAGNOSIS — E78.00 HYPERCHOLESTEROLEMIA: Primary | ICD-10-CM

## 2024-07-23 DIAGNOSIS — E78.00 ELEVATED LDL CHOLESTEROL LEVEL: ICD-10-CM

## 2024-07-23 DIAGNOSIS — R79.89 LOW VITAMIN B12 LEVEL: ICD-10-CM

## 2024-07-23 DIAGNOSIS — E55.9 VITAMIN D DEFICIENCY: ICD-10-CM

## 2024-07-23 DIAGNOSIS — D72.829 LEUKOCYTOSIS, UNSPECIFIED TYPE: ICD-10-CM

## 2024-08-02 ENCOUNTER — HOSPITAL ENCOUNTER (OUTPATIENT)
Dept: RADIOLOGY | Facility: CLINIC | Age: 69
Discharge: HOME | End: 2024-08-02
Payer: MEDICARE

## 2024-08-02 ENCOUNTER — LAB (OUTPATIENT)
Dept: LAB | Facility: LAB | Age: 69
End: 2024-08-02
Payer: MEDICARE

## 2024-08-02 VITALS — WEIGHT: 140 LBS | BODY MASS INDEX: 26.43 KG/M2 | HEIGHT: 61 IN

## 2024-08-02 DIAGNOSIS — E78.00 ELEVATED LDL CHOLESTEROL LEVEL: ICD-10-CM

## 2024-08-02 DIAGNOSIS — Z12.31 VISIT FOR SCREENING MAMMOGRAM: ICD-10-CM

## 2024-08-02 DIAGNOSIS — D72.829 LEUKOCYTOSIS, UNSPECIFIED TYPE: ICD-10-CM

## 2024-08-02 DIAGNOSIS — R79.89 LOW VITAMIN B12 LEVEL: ICD-10-CM

## 2024-08-02 DIAGNOSIS — E78.00 HYPERCHOLESTEROLEMIA: ICD-10-CM

## 2024-08-02 PROCEDURE — 83721 ASSAY OF BLOOD LIPOPROTEIN: CPT

## 2024-08-02 PROCEDURE — 36415 COLL VENOUS BLD VENIPUNCTURE: CPT

## 2024-08-02 PROCEDURE — 80061 LIPID PANEL: CPT

## 2024-08-02 PROCEDURE — 82607 VITAMIN B-12: CPT

## 2024-08-02 PROCEDURE — 77067 SCR MAMMO BI INCL CAD: CPT

## 2024-08-02 PROCEDURE — 85025 COMPLETE CBC W/AUTO DIFF WBC: CPT

## 2024-08-02 PROCEDURE — 84443 ASSAY THYROID STIM HORMONE: CPT

## 2024-08-03 LAB
BASOPHILS # BLD AUTO: 0.02 X10*3/UL (ref 0–0.1)
BASOPHILS NFR BLD AUTO: 0.3 %
CHOLEST SERPL-MCNC: 213 MG/DL (ref 0–199)
CHOLESTEROL/HDL RATIO: 3.6
EOSINOPHIL # BLD AUTO: 0.07 X10*3/UL (ref 0–0.7)
EOSINOPHIL NFR BLD AUTO: 0.9 %
ERYTHROCYTE [DISTWIDTH] IN BLOOD BY AUTOMATED COUNT: 12.9 % (ref 11.5–14.5)
HCT VFR BLD AUTO: 42.5 % (ref 36–46)
HDLC SERPL-MCNC: 59.3 MG/DL
HGB BLD-MCNC: 14 G/DL (ref 12–16)
IMM GRANULOCYTES # BLD AUTO: 0.02 X10*3/UL (ref 0–0.7)
IMM GRANULOCYTES NFR BLD AUTO: 0.3 % (ref 0–0.9)
LDLC SERPL CALC-MCNC: 130 MG/DL
LDLC SERPL DIRECT ASSAY-MCNC: 128 MG/DL (ref 0–129)
LYMPHOCYTES # BLD AUTO: 2.33 X10*3/UL (ref 1.2–4.8)
LYMPHOCYTES NFR BLD AUTO: 30.8 %
MCH RBC QN AUTO: 31 PG (ref 26–34)
MCHC RBC AUTO-ENTMCNC: 32.9 G/DL (ref 32–36)
MCV RBC AUTO: 94 FL (ref 80–100)
MONOCYTES # BLD AUTO: 0.52 X10*3/UL (ref 0.1–1)
MONOCYTES NFR BLD AUTO: 6.9 %
NEUTROPHILS # BLD AUTO: 4.61 X10*3/UL (ref 1.2–7.7)
NEUTROPHILS NFR BLD AUTO: 60.8 %
NON HDL CHOLESTEROL: 154 MG/DL (ref 0–149)
NRBC BLD-RTO: 0 /100 WBCS (ref 0–0)
PLATELET # BLD AUTO: 281 X10*3/UL (ref 150–450)
RBC # BLD AUTO: 4.52 X10*6/UL (ref 4–5.2)
TRIGL SERPL-MCNC: 117 MG/DL (ref 0–149)
TSH SERPL-ACNC: 1.23 MIU/L (ref 0.44–3.98)
VIT B12 SERPL-MCNC: 364 PG/ML (ref 211–911)
VLDL: 23 MG/DL (ref 0–40)
WBC # BLD AUTO: 7.6 X10*3/UL (ref 4.4–11.3)

## 2024-08-05 ENCOUNTER — APPOINTMENT (OUTPATIENT)
Dept: PRIMARY CARE | Facility: CLINIC | Age: 69
End: 2024-08-05
Payer: MEDICARE

## 2024-08-09 NOTE — PROGRESS NOTES
Subjective   Reason for Visit: Wendy Savage is an 69 y.o. female here for a Medicare Wellness visit and Annual Physical.  LAST VISIT PLAN FROM: 07/31/2023  Patient instructions:  Thank you for coming in for your wellness visit.  Follow up in one year--call ahead to have lab orders placed prior to that visit.  Blood test in November to check A1c and hepatitis c screening.  If you have another episode that might be a food allergy will allergy refer.  Dermatologist for psoriasis as planned.  See your hand surgeon for trigger finger if it keeps bothering you. Try massage every night with a cream that has vitamin E in it.  Please do your health care power of .  Mammogram and bone density as planned.  END INFO FROM PRIOR VISIT     HPI  Health maintenance items last completed:  Colonoscopy: 12/29/2016 NORMAL COLONIC MUCOSA. SMALL INTERNAL HEMORRHOIDS. CONTINUE METAMUCIL DAILY.   Mammogram: 08/02/2024 IMPRESSION: No mammographic evidence of malignancy. BI-RADS Category:  1 Negative. Recommendation:  Annual Screening. Recommended Date:  1 Year. Due 08/02/2025.  Bone Density: 08/07/2023 FINDINGS: SPINE L1-L4 T-Score 0.3 Classification:  Normal, LEFT FEMUR -TOTAL T-Score -1.2  Classification:  Osteopenia, LEFT FEMUR -NECK T-Score -1.3  Classification: Osteopenia Due 08/07/2025.  Pap: 2020 pap and HPV test were normal in 2020. Laparoscopic total hysterectomy (TAHBSO) May 2024.  Pelvic: Dr. Campbell June 2023.  Breast exam in office: Today  Vaccines due or not completed: Zoster vaccine completed.  Pneumococcal and RSV never done.  T-dap due 05/07/2023.  Influenza and Covid due Fall 2024.  Last Health Maintenance exam: 07/31/2023    Patient Care Team:  Maxine Fernández MD as PCP - General  Maxine Fernández MD as PCP - [employer] PCP  Jose Serrano MD PhD as Consulting Physician (Cardiology)  Zeynep Campbell MD as Surgeon (Obstetrics and Gynecology)  Dangelo Bell MD as Referring Physician  (Gastroenterology)     Patient is here for a subsequent Annual Wellness visit as well as follow up on / management of medical problems.and review of results.    I reviewed current and past Medical, Surgical and Family History, as well as allergies and updated the medical record.    I reviewed the questions and answers of the Medicare Wellness Visit form including screenings for depression, alcohol, and fall risk.    I reviewed medications from this prescribing practitioner, outside practitioners, clinical pharmacist and patient directed including OTCs, herbal therapies and supplements, as well as prescriptions. These are documented in the medical record.  Refills provided as required.    I discussed code status with the patient, and they understand the difference between full code and DNR.  She is full code.  I discussed HCPOA and LW. Her first HCPOA is her , Paulino.  Her second HCPOA would be her son.  Advised to complete paperwork and bring into the office for us to scan an place in her chart.    I reviewed and updated the patient's Care Team in the chart.    Patient denies any significant concerns at this time.  She reports having a total hysterectomy in May 2024.  She continues to follow with Dr. Campbell. For urology and gyn care and will have pelvic exams done there going forward.    We discussed vaccines.  Patient requires Prevnar 15 vaccine series to complete her pneumococcal series.  Recommended RSV (optional) unless around babies 0-6 months.  We discussed hepatitis A and B vaccines.  Patient is unsure if she has had them but has no plans to travel.  Recommended getting both; discussed vaccine regimen.  Recommended updated Covid booster in September and influenza in Fall 2024.  Advised to separate vaccines by 1 week or so.    Patient did see  Cardiology, Dr. Serrano.  We discussed starting a statin. Her last LDL was 130 mg/dL on 08/02/2024 with goal below 100 mg/dL. Her last CAC score was low risk  below 100, score was 7.  We discussed Zetia being beneficial.  Recommended following a Mediterranean diet.  We discussed dietary modifications along with the possibility of adding more fiber or Metamucil daily.  Order placed for Zetia 10 mg. She does need to lower cholesterol. She prefers not taking a statin and she did discuss this with Dr Serrano. Her plaque score is minimal and did not change much over 5 years, it is reasonable to look at significantly lowering cholesterol going forward with goal of reducing new plaque formation.      We discussed her most recent blood work including low B12.  In the past her B12 was 269, then normal, than 364.  We discussed that lower B12 can increase risk of cognitive decline if chronically below 400.  Recommended taking Country Life B Complex (1 tablet daily) which would contain enough B12 for her. Continue vitamin D supplementation. Ck levels annually.    Elevated A1c:  Last A1c on 12/01/2023 was 5.9.  We discussed dietary modifications with the possibility of adding metformin in the future if needed.     History of Abdominal Pain:  Patient reports an ER visit (04/26/2024) for abdominal pain.  She underwent imaging for suspicion of cholelithiasis which was negative. By history she previously had a  2 mm gallbladder polyp which looked benign in appearance with no follow up needed. CT May 2024 was negative and no polyp seen in the gb..  Symptoms have resolved.  She does report intermittent heartburn.takes prilosec prn and as discussed with gi. It is not worse and no dysphagia.    Snoring:  Patient reports snoring and is scheduled for a sleep study.she is in a study at the [facility].she does not endorse daytime fatigue or stopping breathing during the night.    Cataract Surgery:  Patient underwent bilateral cataract surgery by Dr. Ojeda in 12/2023 without complication.    Arthritis:  Bilateral hands.  It is stable, no swelling but there are deformities at dip joints and sometimes  pain with a lot of use.  She has Psoriasis: she saw derm at [facility], not sure of his name. He brought up PSA when looking at her hands. He tried her on 2 different injectable immune modulators with no improvement in skin or hands. She does not believe she has PSA (nor do I) but would appreciate another opinion as well as a new derm. She notes the creams work better than the injections did.   Order placed for referral to Dr. Molly Hale. We discussed referral to Rheumatology for a second opinion on psoriatic arthritis.  Order placed.      For exercise, she does ski. And does exercise programs with bickin.  We discussed that adding core strengthening and muscle resistant training would also be beneficial.    Blood work ordered to be completed in 3 months.    Order placed for mammogram.    Social History:  Patient reports 1 alcoholic beverage per night.  Recommended reducing alcohol intake.     Follow up in 1 year.    Review of Systems  All systems reviewed and are negative unless otherwise stated in HPI or noted in writing on the written   3  page history and review of systems document reviewed by me and  scanned in with this visit. This is scanned either to notes or to media, with today's date.    Objective   Latest Complete Lab Results:    Chemistry    Lab Results   Component Value Date/Time     05/10/2024 0925    K 4.7 05/10/2024 0925     05/10/2024 0925    CO2 31 05/10/2024 0925    BUN 18 05/10/2024 0925    CREATININE 0.71 05/10/2024 0925    Lab Results   Component Value Date/Time    CALCIUM 8.7 05/10/2024 0925    ALKPHOS 64 04/26/2024 0908    AST 13 04/26/2024 0908    ALT 13 04/26/2024 0908    BILITOT 0.4 04/26/2024 0908           Lab Results   Component Value Date    WBC 7.6 08/02/2024    HGB 14.0 08/02/2024    HCT 42.5 08/02/2024    MCV 94 08/02/2024     08/02/2024      Lab Results   Component Value Date    HGBA1C 5.9 (H) 12/01/2023      Lab Results   Component Value Date  "   LDLCALC 130 (H) 08/02/2024      No results found for: \"ALBUR\", \"[id number]\"   Lab Results   Component Value Date    OHMJJSRK30 364 08/02/2024      Lab Results   Component Value Date    TSH 1.23 08/02/2024      No results found for: \"PSA\"   Lab Results   Component Value Date    CHOL 213 (H) 08/02/2024    CHOL 189 05/19/2023    CHOL 174 08/04/2022     Lab Results   Component Value Date    HDL 59.3 08/02/2024    HDL 60.0 05/19/2023    HDL 59.9 08/04/2022     Lab Results   Component Value Date    LDLCALC 130 (H) 08/02/2024     Lab Results   Component Value Date    TRIG 117 08/02/2024    TRIG 71 05/19/2023    TRIG 72 08/04/2022     === 07/05/24 ===  CT CARDIAC SCORING WO IV CONTRAST  - Impression -  1. Coronary artery calcium score of 7.55*.  2. This represents a mild increase in atherosclerotic calcification  when compared to CT calcium score from 06/20/2019  *Coronary Artery  Agatston score  Score  risk  Very low 1-99  Mildly increased 100-299  Moderately increased >300  Moderate to severely increased >800  Damion et al. JCCT 2016 (http://dx.doi.org/10.1016/j.jcct.2016.11.003)  DELGADO 10-Year CHD Risk with Coronary Artery Calcification can be  calcuate using link below  [email or web address]  Carlos bell al. JACC 2015 (http://dx.doi.org/10.1016/j.j  acc.2015.08.035)  Signed by: Sarbjit Downs 7/8/2024 8:44 AM  Dictation workstation:   [id number]     Vitals:      5/25/2024     7:57 AM 5/25/2024    11:50 AM 6/11/2024     9:46 AM 7/8/2024     4:15 PM 7/17/2024    10:03 AM 8/2/2024    10:18 AM 8/14/2024    10:04 AM   Vitals   Systolic 107 122 113 122 120  100   Diastolic 64 78 70 78 82  60   Heart Rate 74 72 78 73 63  60   Temp 36.7 °C (98.1 °F) 36.9 °C (98.4 °F)        Resp 18 14     16   Height (in)   1.549 m (5' 1\") 1.549 m (5' 1\") 1.562 m (5' 1.5\") 1.562 m (5' 1.5\") 1.53 m (5' 0.25\")   Weight (lb)   141.2 143 143.8 140 142   BMI   26.68 kg/m2 27.02 kg/m2 26.73 kg/m2 " 26.03 kg/m2 27.5 kg/m2   BSA (m2)   1.66 m2 1.67 m2 1.68 m2 1.66 m2 1.65 m2   Visit Report   Report Report Report  Report     Physical Exam  General: Patient is known to me, looks well, is in usual state of health, with no obvious distress.  Head: normocephalic  Eyes: PERRL, EOMI, no scleral icterus or conjunctival abnormality  Ears: Normal canals and TM's.  Oropharynx: Well hydrated mucosa. no lesions, erythema. palate moves well.  Neck: No masses, no cervical (A/P/ or Lateral) adenopathy. Thyroid not enlarged and no nodules. Carotid pulses normal and no bruits.  Chest: Normal AP diameter. No supraclavicular adenopathy.  Lungs: Clear to auscultation: no rales , wheezes, rhonchi, rubs, examined bilaterally, ant/post /lateral. RR normal.  Heart: RRR. No MGCR S1 S2 normal.  Abd: BS normal, no bruits. No hepatosplenomegaly. No abdominal mass or tenderness. No distension.  Extremities: No upper extremity edema. No lower leg edema.No ischemia.   Joints: no synovitis seen. No deformities.  Pulses: normal posterior tibialis pulses, normal dorsalis pedis pulses. normal radial pulses. Normal femoral pulses without bruits.  Neuro: CN 2-12 intact . Alert, oriented, appropriate.  No focal weakness observed. No tremors. Ambulation is normal .  Psych: Mood and affect normal. No cognitive deficits noted during this exam. Alert, oriented, appropriate.  Skin: No rash, petechiae or excessive bruising.  Lymph: no SC axillary or inguinal adenopathy     Breast Exam : Symmetric appearance. No masses, nipple discharge, skin retraction, axillary or supraclavicular adenopathy.     Wendy was seen today for medicare annual wellness visit subsequent.  Diagnoses and all orders for this visit:  Encounter for subsequent annual wellness visit (AWV) in Medicare patient (Primary)  Encounter for annual physical exam  Full code status  Encounter to discuss test results  Coronary artery calcification seen on CAT scan  Comments:  low risk, 7  Orders:  -      ezetimibe (Zetia) 10 mg tablet; Take 1 tablet (10 mg) by mouth once daily.  Agatston coronary artery calcium score less than 100  Elevated LDL cholesterol level  -     ezetimibe (Zetia) 10 mg tablet; Take 1 tablet (10 mg) by mouth once daily.  -     Lipid panel; Future  Gastroesophageal reflux disease, unspecified whether esophagitis present  Ascending aorta dilatation (CMS-HCC)  Comments:  3.8 cm on 2024 ct cardiac, minimal dilatation, also sees dr alcaraz  Low vitamin B12 level  -     Vitamin B12; Future  Elevated hemoglobin A1c  Vitamin D deficiency  -     Vitamin D 25-Hydroxy,Total (for eval of Vitamin D levels); Future  Medication monitoring encounter  -     AST; Future  -     ALT; Future  Encounter for screening mammogram for breast cancer  -     Cancel: BI mammo bilateral screening tomosynthesis; Future  -     BI mammo bilateral screening tomosynthesis; Future  Psoriasis  -     Referral to Rheumatology; Future  -     Referral to Dermatology  Arthritis  -     Referral to Rheumatology; Future  New medication added  Immunization counseling    Recommend statin anti plaque. Or at least zetia. Has 10 year data. Ldl goal is 70. Mediterranean diet    Patient Instructions:  t was nice to see you today.    Recommend Prevnar 15 vaccine, this completes the series to protect against  pneumococcal infections.    Recommend Hepatitis A/B series:   Month 0: Hep A and B combination vaccine  Month 1: Hep B only  Month 6 - 12 months: Hep A and B combination vaccine again    Consider RSV vaccination.optional for now.    Updated covid booster recommended this fall -watch information from the CDC.  Flu shot recommended in October     Mediterranean diet is recommended.  Exercise 30 minutes per day or more, include weight bearing exercise and flexibility/strengthening.  Hydrate 64 oz water per day.    Cholesterol : add zetia 10mg once per day.  Fasting blood test to check on how this is working, also check vitamin D and B12, in  3 months-before Thanksgiving.    Country Life B Coenzyme B complex: one cap per day should be enough.  Stop the extra B12 and your other b complex. Keep up with vitamin D and multivitamin.    Follow up one year.  Please do your hcpoa and lw.    Sooner if needed.  Referrals non urgent:  Derm for psoriasis care.  Rheumatology for second opinion on if you have PSA-do not think you do.  --------------  Annual  history and physical completed including  ROS, PE.   Medicare wellness visit  completed including:  Immunizations, needs prevnar 15, consider rsv -discussed risk to infants,   Health Maintenance items,  Discussion of advanced directives and code status, which is: full code, ok cpr hcpoa discussed  Fall risk and prevention addressed.  Functionality and pain were assessed.   Cancer screening testing was addressed as appropriate-see patient discussion/orders.   Medications were discussed and reviewed/reconciled and refill need assessed/handled.   Patient states taking their medication regularly and appropriately.  Also:   Depression screening PhQ-2 completed: neg  Alcohol misuse screen: neg    In addition to the wellness visit and screening, the above medical issues were addressed:  As well as results of testing completed since last visit.    Scribe Attestation  By signing my name below, Sophia LEONE, Cassidy   attest that this documentation has been prepared under the direction and in the presence of Maxine Fernández MD.   Time:89 minutes

## 2024-08-14 ENCOUNTER — APPOINTMENT (OUTPATIENT)
Dept: PRIMARY CARE | Facility: CLINIC | Age: 69
End: 2024-08-14
Payer: MEDICARE

## 2024-08-14 VITALS
SYSTOLIC BLOOD PRESSURE: 100 MMHG | HEART RATE: 60 BPM | BODY MASS INDEX: 27.88 KG/M2 | WEIGHT: 142 LBS | HEIGHT: 60 IN | DIASTOLIC BLOOD PRESSURE: 60 MMHG | RESPIRATION RATE: 16 BRPM

## 2024-08-14 DIAGNOSIS — K21.9 GASTROESOPHAGEAL REFLUX DISEASE, UNSPECIFIED WHETHER ESOPHAGITIS PRESENT: ICD-10-CM

## 2024-08-14 DIAGNOSIS — R93.1 AGATSTON CORONARY ARTERY CALCIUM SCORE LESS THAN 100: ICD-10-CM

## 2024-08-14 DIAGNOSIS — Z71.85 IMMUNIZATION COUNSELING: ICD-10-CM

## 2024-08-14 DIAGNOSIS — M19.90 ARTHRITIS: ICD-10-CM

## 2024-08-14 DIAGNOSIS — Z12.31 ENCOUNTER FOR SCREENING MAMMOGRAM FOR BREAST CANCER: ICD-10-CM

## 2024-08-14 DIAGNOSIS — Z51.81 MEDICATION MONITORING ENCOUNTER: ICD-10-CM

## 2024-08-14 DIAGNOSIS — Z79.899 NEW MEDICATION ADDED: ICD-10-CM

## 2024-08-14 DIAGNOSIS — L40.9 PSORIASIS: ICD-10-CM

## 2024-08-14 DIAGNOSIS — R79.89 LOW VITAMIN B12 LEVEL: ICD-10-CM

## 2024-08-14 DIAGNOSIS — E55.9 VITAMIN D DEFICIENCY: ICD-10-CM

## 2024-08-14 DIAGNOSIS — I25.10 CORONARY ARTERY CALCIFICATION SEEN ON CAT SCAN: ICD-10-CM

## 2024-08-14 DIAGNOSIS — Z00.00 ENCOUNTER FOR SUBSEQUENT ANNUAL WELLNESS VISIT (AWV) IN MEDICARE PATIENT: Primary | ICD-10-CM

## 2024-08-14 DIAGNOSIS — Z71.2 ENCOUNTER TO DISCUSS TEST RESULTS: ICD-10-CM

## 2024-08-14 DIAGNOSIS — E78.00 ELEVATED LDL CHOLESTEROL LEVEL: ICD-10-CM

## 2024-08-14 DIAGNOSIS — Z00.00 ENCOUNTER FOR ANNUAL PHYSICAL EXAM: ICD-10-CM

## 2024-08-14 DIAGNOSIS — R73.09 ELEVATED HEMOGLOBIN A1C: ICD-10-CM

## 2024-08-14 DIAGNOSIS — Z78.9 FULL CODE STATUS: ICD-10-CM

## 2024-08-14 DIAGNOSIS — I77.810 ASCENDING AORTA DILATATION (CMS-HCC): ICD-10-CM

## 2024-08-14 PROCEDURE — 1159F MED LIST DOCD IN RCRD: CPT | Performed by: INTERNAL MEDICINE

## 2024-08-14 PROCEDURE — 1126F AMNT PAIN NOTED NONE PRSNT: CPT | Performed by: INTERNAL MEDICINE

## 2024-08-14 PROCEDURE — 1160F RVW MEDS BY RX/DR IN RCRD: CPT | Performed by: INTERNAL MEDICINE

## 2024-08-14 PROCEDURE — 1158F ADVNC CARE PLAN TLK DOCD: CPT | Performed by: INTERNAL MEDICINE

## 2024-08-14 PROCEDURE — 99397 PER PM REEVAL EST PAT 65+ YR: CPT | Performed by: INTERNAL MEDICINE

## 2024-08-14 PROCEDURE — 3008F BODY MASS INDEX DOCD: CPT | Performed by: INTERNAL MEDICINE

## 2024-08-14 PROCEDURE — 99213 OFFICE O/P EST LOW 20 MIN: CPT | Performed by: INTERNAL MEDICINE

## 2024-08-14 PROCEDURE — G0439 PPPS, SUBSEQ VISIT: HCPCS | Performed by: INTERNAL MEDICINE

## 2024-08-14 PROCEDURE — 1123F ACP DISCUSS/DSCN MKR DOCD: CPT | Performed by: INTERNAL MEDICINE

## 2024-08-14 PROCEDURE — 1170F FXNL STATUS ASSESSED: CPT | Performed by: INTERNAL MEDICINE

## 2024-08-14 RX ORDER — EZETIMIBE 10 MG/1
10 TABLET ORAL DAILY
Qty: 90 TABLET | Refills: 3 | Status: SHIPPED | OUTPATIENT
Start: 2024-08-14 | End: 2025-08-14

## 2024-08-14 SDOH — ECONOMIC STABILITY: FOOD INSECURITY: WITHIN THE PAST 12 MONTHS, THE FOOD YOU BOUGHT JUST DIDN'T LAST AND YOU DIDN'T HAVE MONEY TO GET MORE.: NEVER TRUE

## 2024-08-14 SDOH — HEALTH STABILITY: PHYSICAL HEALTH: ON AVERAGE, HOW MANY MINUTES DO YOU ENGAGE IN EXERCISE AT THIS LEVEL?: 90 MIN

## 2024-08-14 SDOH — ECONOMIC STABILITY: INCOME INSECURITY: IN THE LAST 12 MONTHS, WAS THERE A TIME WHEN YOU WERE NOT ABLE TO PAY THE MORTGAGE OR RENT ON TIME?: NO

## 2024-08-14 SDOH — ECONOMIC STABILITY: FOOD INSECURITY: WITHIN THE PAST 12 MONTHS, YOU WORRIED THAT YOUR FOOD WOULD RUN OUT BEFORE YOU GOT MONEY TO BUY MORE.: NEVER TRUE

## 2024-08-14 SDOH — HEALTH STABILITY: PHYSICAL HEALTH: ON AVERAGE, HOW MANY DAYS PER WEEK DO YOU ENGAGE IN MODERATE TO STRENUOUS EXERCISE (LIKE A BRISK WALK)?: 4 DAYS

## 2024-08-14 ASSESSMENT — ANXIETY QUESTIONNAIRES
6. BECOMING EASILY ANNOYED OR IRRITABLE: NOT AT ALL
7. FEELING AFRAID AS IF SOMETHING AWFUL MIGHT HAPPEN: NOT AT ALL
IF YOU CHECKED OFF ANY PROBLEMS ON THIS QUESTIONNAIRE, HOW DIFFICULT HAVE THESE PROBLEMS MADE IT FOR YOU TO DO YOUR WORK, TAKE CARE OF THINGS AT HOME, OR GET ALONG WITH OTHER PEOPLE: NOT DIFFICULT AT ALL
2. NOT BEING ABLE TO STOP OR CONTROL WORRYING: NOT AT ALL
5. BEING SO RESTLESS THAT IT IS HARD TO SIT STILL: NOT AT ALL
3. WORRYING TOO MUCH ABOUT DIFFERENT THINGS: NOT AT ALL
GAD7 TOTAL SCORE: 0
1. FEELING NERVOUS, ANXIOUS, OR ON EDGE: NOT AT ALL
4. TROUBLE RELAXING: NOT AT ALL

## 2024-08-14 ASSESSMENT — ACTIVITIES OF DAILY LIVING (ADL)
PILL BOX USED: NO
WALKS IN HOME: INDEPENDENT
JUDGMENT_ADEQUATE_SAFELY_COMPLETE_DAILY_ACTIVITIES: YES
ADEQUATE_TO_COMPLETE_ADL: YES
USING TRANSPORTATION: INDEPENDENT
BATHING: INDEPENDENT
STIL DRIVING: YES
GROOMING: INDEPENDENT
GROCERY SHOPPING: INDEPENDENT
HEARING - LEFT EAR: FUNCTIONAL
FEEDING YOURSELF: INDEPENDENT
DOING HOUSEWORK: INDEPENDENT
PREPARING MEALS: INDEPENDENT
NEEDS ASSISTANCE WITH FOOD: INDEPENDENT
HEARING - RIGHT EAR: FUNCTIONAL
EATING: INDEPENDENT
MANAGING FINANCES: INDEPENDENT
TAKING MEDICATION: INDEPENDENT
DRESSING YOURSELF: INDEPENDENT
TOILETING: INDEPENDENT
USING TELEPHONE: INDEPENDENT

## 2024-08-14 ASSESSMENT — SOCIAL DETERMINANTS OF HEALTH (SDOH)
WITHIN THE LAST YEAR, HAVE YOU BEEN AFRAID OF YOUR PARTNER OR EX-PARTNER?: NO
IN A TYPICAL WEEK, HOW MANY TIMES DO YOU TALK ON THE PHONE WITH FAMILY, FRIENDS, OR NEIGHBORS?: MORE THAN THREE TIMES A WEEK
HOW OFTEN DO YOU ATTENT MEETINGS OF THE CLUB OR ORGANIZATION YOU BELONG TO?: MORE THAN 4 TIMES PER YEAR
HOW OFTEN DO YOU ATTEND CHURCH OR RELIGIOUS SERVICES?: MORE THAN 4 TIMES PER YEAR
IN THE PAST 12 MONTHS, HAS THE ELECTRIC, GAS, OIL, OR WATER COMPANY THREATENED TO SHUT OFF SERVICE IN YOUR HOME?: NO
DO YOU BELONG TO ANY CLUBS OR ORGANIZATIONS SUCH AS CHURCH GROUPS UNIONS, FRATERNAL OR ATHLETIC GROUPS, OR SCHOOL GROUPS?: YES
WITHIN THE LAST YEAR, HAVE YOU BEEN KICKED, HIT, SLAPPED, OR OTHERWISE PHYSICALLY HURT BY YOUR PARTNER OR EX-PARTNER?: NO
HOW OFTEN DO YOU GET TOGETHER WITH FRIENDS OR RELATIVES?: MORE THAN THREE TIMES A WEEK
WITHIN THE LAST YEAR, HAVE YOU BEEN HUMILIATED OR EMOTIONALLY ABUSED IN OTHER WAYS BY YOUR PARTNER OR EX-PARTNER?: NO
WITHIN THE LAST YEAR, HAVE TO BEEN RAPED OR FORCED TO HAVE ANY KIND OF SEXUAL ACTIVITY BY YOUR PARTNER OR EX-PARTNER?: NO
HOW HARD IS IT FOR YOU TO PAY FOR THE VERY BASICS LIKE FOOD, HOUSING, MEDICAL CARE, AND HEATING?: NOT VERY HARD

## 2024-08-14 ASSESSMENT — PATIENT HEALTH QUESTIONNAIRE - PHQ9
2. FEELING DOWN, DEPRESSED OR HOPELESS: NOT AT ALL
1. LITTLE INTEREST OR PLEASURE IN DOING THINGS: NOT AT ALL
1. LITTLE INTEREST OR PLEASURE IN DOING THINGS: NOT AT ALL
SUM OF ALL RESPONSES TO PHQ9 QUESTIONS 1 & 2: 0
2. FEELING DOWN, DEPRESSED OR HOPELESS: NOT AT ALL
SUM OF ALL RESPONSES TO PHQ9 QUESTIONS 1 AND 2: 0

## 2024-08-14 ASSESSMENT — LIFESTYLE VARIABLES
SKIP TO QUESTIONS 9-10: 1
AUDIT-C TOTAL SCORE: 4
HOW OFTEN DO YOU HAVE A DRINK CONTAINING ALCOHOL: 4 OR MORE TIMES A WEEK
HOW MANY STANDARD DRINKS CONTAINING ALCOHOL DO YOU HAVE ON A TYPICAL DAY: 1 OR 2
HOW OFTEN DO YOU HAVE SIX OR MORE DRINKS ON ONE OCCASION: NEVER

## 2024-08-14 ASSESSMENT — COLUMBIA-SUICIDE SEVERITY RATING SCALE - C-SSRS
1. IN THE PAST MONTH, HAVE YOU WISHED YOU WERE DEAD OR WISHED YOU COULD GO TO SLEEP AND NOT WAKE UP?: NO
2. HAVE YOU ACTUALLY HAD ANY THOUGHTS OF KILLING YOURSELF?: NO
6. HAVE YOU EVER DONE ANYTHING, STARTED TO DO ANYTHING, OR PREPARED TO DO ANYTHING TO END YOUR LIFE?: NO

## 2024-08-14 ASSESSMENT — PAIN SCALES - GENERAL: PAINLEVEL: 0-NO PAIN

## 2024-08-14 NOTE — PATIENT INSTRUCTIONS
It was nice to see you today.    Recommend Prevnar 15 vaccine, this completes the series to protect against  pneumococcal infections.    Recommend Hepatitis A/B series:   Month 0: Hep A and B combination vaccine  Month 1: Hep B only  Month 6 - 12 months: Hep A and B combination vaccine again    Consider RSV vaccination.optional for now.    Updated covid booster recommended this fall -watch information from the CDC.  Flu shot recommended in October     Mediterranean diet is recommended.  Exercise 30 minutes per day or more, include weight bearing exercise and flexibility/strengthening.  Hydrate 64 oz water per day.    Cholesterol : add zetia 10mg once per day.  Fasting blood test to check on how this is working, also check vitamin D and B12, in 3 months-before Thanksgiving.    Country Life B Coenzyme B complex: one cap per day should be enough.  Stop the extra B12 and your other b complex. Keep up with vitamin D and multivitamin.    Follow up one year.  Please do your hcpoa and lw.    Sooner if needed.  Referrals non urgent:  Derm for psoriasis care.  Rheumatology for second opinion on if you have PSA-do not think you do.

## 2024-09-04 ENCOUNTER — APPOINTMENT (OUTPATIENT)
Dept: OBSTETRICS AND GYNECOLOGY | Facility: CLINIC | Age: 69
End: 2024-09-04
Payer: MEDICARE

## 2024-09-05 ENCOUNTER — OFFICE VISIT (OUTPATIENT)
Dept: OBSTETRICS AND GYNECOLOGY | Facility: CLINIC | Age: 69
End: 2024-09-05
Payer: MEDICARE

## 2024-09-05 ENCOUNTER — APPOINTMENT (OUTPATIENT)
Dept: OBSTETRICS AND GYNECOLOGY | Facility: CLINIC | Age: 69
End: 2024-09-05
Payer: MEDICARE

## 2024-09-05 VITALS
SYSTOLIC BLOOD PRESSURE: 114 MMHG | BODY MASS INDEX: 27.39 KG/M2 | DIASTOLIC BLOOD PRESSURE: 81 MMHG | WEIGHT: 141.4 LBS | HEART RATE: 73 BPM

## 2024-09-05 DIAGNOSIS — Z90.710 HISTORY OF HYSTERECTOMY: ICD-10-CM

## 2024-09-05 DIAGNOSIS — Z98.890 POST-OPERATIVE STATE: Primary | ICD-10-CM

## 2024-09-05 PROCEDURE — 1159F MED LIST DOCD IN RCRD: CPT | Performed by: NURSE PRACTITIONER

## 2024-09-05 PROCEDURE — 99211 OFF/OP EST MAY X REQ PHY/QHP: CPT | Performed by: NURSE PRACTITIONER

## 2024-09-05 NOTE — PROGRESS NOTES
Subjective   TRI Savage is a 69 y.o. female who presents to the clinic for final status post  Laparoscopic Total Hysterectomy; Bilateral Salpingectomy-Oophorectomy; Laparoscopic colpopexy; Posterior repairs; Perineorrhaphy; Cystoscopy  for urinary incontinence and Cystocele with second degree uterine prolapse . The patient is not having any pain.  She reports feeling well and denies any issues. She continues to use estrogen cream vaginally twice a week. She reports she has resumed sexual intercourse without any complications, and believes all sutures have subsided.       Objective   /81 (BP Location: Left arm, Patient Position: Sitting, BP Cuff Size: Adult)   Pulse 73   Wt 64.1 kg (141 lb 6.4 oz)   BMI 27.39 kg/m²   General:  alert and oriented, in no acute distress   Abdomen:    Incision:   healing well, no drainage, no erythema, no swelling, no dehiscence, incision well approximated       Assessment/Plan    Doing well postoperatively.  Operative findings again reviewed. Pathology report discussed.    1. Continue any current medications.  2. Wound care discussed.  3. Activity restrictions: none  4. Anticipated return to work: not applicable.  5. Follow up: 1  year  from date of surgery to ensure no recurrence of prolapse or other complications.         Scribe Attestation  By signing my name below, IMandi Scribe, attest that this documentation has been prepared under the direction and in the presence of CLYDE Burnett on 09/05/2024 at 8:13 PM.   IStephanie APRN-CNP, personally performed the services described in this documentation which was scribed virtually and I confirm that it is both accurate and complete.

## 2024-10-17 ENCOUNTER — TELEPHONE (OUTPATIENT)
Dept: DERMATOLOGY | Facility: CLINIC | Age: 69
End: 2024-10-17

## 2024-10-17 ENCOUNTER — OFFICE VISIT (OUTPATIENT)
Dept: DERMATOLOGY | Facility: CLINIC | Age: 69
End: 2024-10-17
Payer: MEDICARE

## 2024-10-17 DIAGNOSIS — Z79.899 ENCOUNTER FOR LONG-TERM (CURRENT) USE OF MEDICATIONS: ICD-10-CM

## 2024-10-17 DIAGNOSIS — L40.0 PSORIASIS VULGARIS: Primary | ICD-10-CM

## 2024-10-17 PROCEDURE — G2211 COMPLEX E/M VISIT ADD ON: HCPCS | Performed by: DERMATOLOGY

## 2024-10-17 PROCEDURE — 1036F TOBACCO NON-USER: CPT | Performed by: DERMATOLOGY

## 2024-10-17 PROCEDURE — 99203 OFFICE O/P NEW LOW 30 MIN: CPT | Performed by: DERMATOLOGY

## 2024-10-17 PROCEDURE — 1159F MED LIST DOCD IN RCRD: CPT | Performed by: DERMATOLOGY

## 2024-10-17 RX ORDER — TACROLIMUS 1 MG/G
OINTMENT TOPICAL
Qty: 30 G | Refills: 1 | Status: SHIPPED | OUTPATIENT
Start: 2024-10-17

## 2024-10-17 ASSESSMENT — DERMATOLOGY PATIENT ASSESSMENT
DO YOU USE SUNSCREEN: OCCASIONALLY
DO YOU USE A TANNING BED: NO
ARE YOU AN ORGAN TRANSPLANT RECIPIENT: NO
ARE YOU ON BIRTH CONTROL: NO
ARE YOU TRYING TO GET PREGNANT: NO
DO YOU HAVE ANY NEW OR CHANGING LESIONS: NO

## 2024-10-17 ASSESSMENT — DERMATOLOGY QUALITY OF LIFE (QOL) ASSESSMENT
ARE THERE EXCLUSIONS OR EXCEPTIONS FOR THE QUALITY OF LIFE ASSESSMENT: NO
WHAT SINGLE SKIN CONDITION LISTED BELOW IS THE PATIENT ANSWERING THE QUALITY-OF-LIFE ASSESSMENT QUESTIONS ABOUT: PSORIASIS
RATE HOW BOTHERED YOU ARE BY EFFECTS OF YOUR SKIN PROBLEMS ON YOUR ACTIVITIES (EG, GOING OUT, ACCOMPLISHING WHAT YOU WANT, WORK ACTIVITIES OR YOUR RELATIONSHIPS WITH OTHERS): 2
RATE HOW BOTHERED YOU ARE BY SYMPTOMS OF YOUR SKIN PROBLEM (EG, ITCHING, STINGING BURNING, HURTING OR SKIN IRRITATION): 3
RATE HOW EMOTIONALLY BOTHERED YOU ARE BY YOUR SKIN PROBLEM (FOR EXAMPLE, WORRY, EMBARRASSMENT, FRUSTRATION): 3

## 2024-10-17 ASSESSMENT — PATIENT GLOBAL ASSESSMENT (PGA): PATIENT GLOBAL ASSESSMENT: PATIENT GLOBAL ASSESSMENT:  2 - MILD

## 2024-10-17 ASSESSMENT — ITCH NUMERIC RATING SCALE: HOW SEVERE IS YOUR ITCHING?: 4

## 2024-10-17 NOTE — LETTER
October 17, 2024     Maxine Fernández MD  [address]  [facility], [address]  [facility] [address]    Patient: TRI Savage   YOB: 1955   Date of Visit: 10/17/2024       Dear Dr. Maxine Fernández MD:    Thank you for referring TRI Savage to me for evaluation. Below are my notes for this consultation.  If you have questions, please do not hesitate to call me. I look forward to following your patient along with you.       Sincerely,     Laila Brooks, DO      CC: No Recipients  ______________________________________________________________________________________    Subjective    TRI Savage is a 69 y.o. female who presents for the following: Psoriasis (Pt here for Psoriasis, located arms, legs, scalp, hands, ears, and eyelids. Clobetasol 0.05%  solution-helpful for scalp and cream-not helpful, Betamethasone augmented 0.05 % ointment. Pt had 2 Skyrizi injections ([facility] [place] dx PsA). Pt does have 1st appt with Rheumatologist in Nov. ).     Review of Systems:  No other skin or systemic complaints other than what is documented elsewhere in the note.    The following portions of the chart were reviewed this encounter and updated as appropriate:         Skin Cancer History  No skin cancer on file.      Specialty Problems          Dermatology Problems    Psoriasis        Objective  Well appearing patient in no apparent distress; mood and affect are within normal limits.    A full examination was performed including scalp, head, eyes, ears, nose, lips, neck, chest, axillae, abdomen, back, buttocks, bilateral upper extremities, bilateral lower extremities, hands, feet, fingers, toes, fingernails, and toenails. All findings within normal limits unless otherwise noted below.    Assessment/Plan  1. Psoriasis vulgaris  Well-demarcated erythematous papules and plaques with overlying silvery scale.  BSA 5%    The chronic and intermittently flaring nature of this skin condition was discussed  today.   This is an inflammatory skin condition that occurs in a bimodal age distribution in genetically pre-disposed individuals. The inflammatory skin condition causes skin to turn over 2x faster than normal skin. The various treatment options were reviewed which include: topical steroids, topical retinoids, nb-UVB therapy, systemic medications including oral medications and injectable (biologic) medications.     Her psoriasis is likely improving due to previous samples of Skyrizi done prior to lab work up. Last injection about 2 months ago so would be due for next injection within the next month.    Due to lack of response to topical therapy, BSA we discussed treatment with biologic therapy. Discussed that this medication does suppress the immune system, need for baseline bloodwork,  and repeat routine blood work is monitored yearly.    After starting a biologic treatment follow-up visits are every 4-6 months.    The side effects of therapy were reviewed including increased risk of infection, local injection site reaction, potential increased risk of malignancy.     Specifically with Cosentyx, taltz these therapies there is a potential increased risk of inflammatory bowel disease.    In addition skyrizi side effects include upper respiratory tract infections.    Plan to resume Skyrizi - however will await labs and rheumatology evaluation (pt scheduled in November)    Start tacrolimus 0.1% ointment. Patient to apply 2x daily as needed.     tacrolimus (Protopic) 0.1 % ointment  Apply to eyelids 2x daily as needed.    Related Procedures  Comprehensive Metabolic Panel  CBC and Auto Differential  T-Spot TB  HIV 1/2 Antigen/Antibody Screen with Reflex to Confirmation  Hepatitis C Antibody  Hepatitis B Surface Antigen  Hepatitis B Surface Antibody  Hepatitis B Core Antibody, Total    2. Encounter for long-term (current) use of medications    Related Procedures  Comprehensive Metabolic Panel  CBC and Auto  Differential  T-Spot TB  HIV 1/2 Antigen/Antibody Screen with Reflex to Confirmation  Hepatitis C Antibody  Hepatitis B Surface Antigen  Hepatitis B Surface Antibody  Hepatitis B Core Antibody, Total        Follow up in pending rheumatology evaluation for continued management of this complex condition on biologic therapy

## 2024-10-17 NOTE — TELEPHONE ENCOUNTER
[facility] to initiate PA for Tacrolimus 0.1% ointment. Case #[id number]. PA is pending at this time. Per rep office will be notified via phone and fax of decision.

## 2024-10-17 NOTE — PROGRESS NOTES
Subjective     TRI Savage is a 69 y.o. female who presents for the following: Psoriasis (Pt here for Psoriasis, located arms, legs, scalp, hands, ears, and eyelids. Clobetasol 0.05%  solution-helpful for scalp and cream-not helpful, Betamethasone augmented 0.05 % ointment. Pt had 2 Skyrizi injections ([facility] [place] dx PsA). Pt does have 1st appt with Rheumatologist in Nov. ).     Review of Systems:  No other skin or systemic complaints other than what is documented elsewhere in the note.    The following portions of the chart were reviewed this encounter and updated as appropriate:         Skin Cancer History  No skin cancer on file.      Specialty Problems          Dermatology Problems    Psoriasis        Objective   Well appearing patient in no apparent distress; mood and affect are within normal limits.    A full examination was performed including scalp, head, eyes, ears, nose, lips, neck, chest, axillae, abdomen, back, buttocks, bilateral upper extremities, bilateral lower extremities, hands, feet, fingers, toes, fingernails, and toenails. All findings within normal limits unless otherwise noted below.    Assessment/Plan   1. Psoriasis vulgaris  Well-demarcated erythematous papules and plaques with overlying silvery scale.  BSA 5%    The chronic and intermittently flaring nature of this skin condition was discussed today.   This is an inflammatory skin condition that occurs in a bimodal age distribution in genetically pre-disposed individuals. The inflammatory skin condition causes skin to turn over 2x faster than normal skin. The various treatment options were reviewed which include: topical steroids, topical retinoids, nb-UVB therapy, systemic medications including oral medications and injectable (biologic) medications.     Her psoriasis is likely improving due to previous samples of Skyrizi done prior to lab work up. Last injection about 2 months ago so would be due for next injection within the next  month.    Due to lack of response to topical therapy, BSA we discussed treatment with biologic therapy. Discussed that this medication does suppress the immune system, need for baseline bloodwork,  and repeat routine blood work is monitored yearly.    After starting a biologic treatment follow-up visits are every 4-6 months.    The side effects of therapy were reviewed including increased risk of infection, local injection site reaction, potential increased risk of malignancy.     Specifically with Cosentyx, taltz these therapies there is a potential increased risk of inflammatory bowel disease.    In addition skyrizi side effects include upper respiratory tract infections.    Plan to resume Skyrizi - however will await labs and rheumatology evaluation (pt scheduled in November)    Start tacrolimus 0.1% ointment. Patient to apply 2x daily as needed.     tacrolimus (Protopic) 0.1 % ointment  Apply to eyelids 2x daily as needed.    Related Procedures  Comprehensive Metabolic Panel  CBC and Auto Differential  T-Spot TB  HIV 1/2 Antigen/Antibody Screen with Reflex to Confirmation  Hepatitis C Antibody  Hepatitis B Surface Antigen  Hepatitis B Surface Antibody  Hepatitis B Core Antibody, Total    2. Encounter for long-term (current) use of medications    Related Procedures  Comprehensive Metabolic Panel  CBC and Auto Differential  T-Spot TB  HIV 1/2 Antigen/Antibody Screen with Reflex to Confirmation  Hepatitis C Antibody  Hepatitis B Surface Antigen  Hepatitis B Surface Antibody  Hepatitis B Core Antibody, Total        Follow up in pending rheumatology evaluation for continued management of this complex condition on biologic therapy

## 2024-11-14 ENCOUNTER — APPOINTMENT (OUTPATIENT)
Dept: RHEUMATOLOGY | Facility: CLINIC | Age: 69
End: 2024-11-14
Payer: MEDICARE

## 2024-11-14 ENCOUNTER — HOSPITAL ENCOUNTER (OUTPATIENT)
Dept: RADIOLOGY | Facility: CLINIC | Age: 69
Discharge: HOME | End: 2024-11-14
Payer: MEDICARE

## 2024-11-14 ENCOUNTER — LAB (OUTPATIENT)
Dept: LAB | Facility: LAB | Age: 69
End: 2024-11-14
Payer: MEDICARE

## 2024-11-14 VITALS
BODY MASS INDEX: 27.46 KG/M2 | WEIGHT: 141.8 LBS | SYSTOLIC BLOOD PRESSURE: 118 MMHG | HEART RATE: 67 BPM | DIASTOLIC BLOOD PRESSURE: 78 MMHG

## 2024-11-14 DIAGNOSIS — M12.9 ARTHROPATHY: ICD-10-CM

## 2024-11-14 DIAGNOSIS — E78.00 ELEVATED LDL CHOLESTEROL LEVEL: ICD-10-CM

## 2024-11-14 DIAGNOSIS — L40.9 PSORIASIS: ICD-10-CM

## 2024-11-14 DIAGNOSIS — Z51.81 MEDICATION MONITORING ENCOUNTER: ICD-10-CM

## 2024-11-14 DIAGNOSIS — Z79.899 ENCOUNTER FOR LONG-TERM (CURRENT) USE OF MEDICATIONS: ICD-10-CM

## 2024-11-14 DIAGNOSIS — E55.9 VITAMIN D DEFICIENCY: ICD-10-CM

## 2024-11-14 DIAGNOSIS — L40.0 PSORIASIS VULGARIS: ICD-10-CM

## 2024-11-14 DIAGNOSIS — R79.89 LOW VITAMIN B12 LEVEL: ICD-10-CM

## 2024-11-14 LAB
25(OH)D3 SERPL-MCNC: 53 NG/ML (ref 30–100)
ALBUMIN SERPL BCP-MCNC: 4.6 G/DL (ref 3.4–5)
ALP SERPL-CCNC: 76 U/L (ref 33–136)
ALT SERPL W P-5'-P-CCNC: 20 U/L (ref 7–45)
ANION GAP SERPL CALC-SCNC: 15 MMOL/L (ref 10–20)
AST SERPL W P-5'-P-CCNC: 19 U/L (ref 9–39)
BASOPHILS # BLD AUTO: 0.03 X10*3/UL (ref 0–0.1)
BASOPHILS NFR BLD AUTO: 0.3 %
BILIRUB SERPL-MCNC: 0.9 MG/DL (ref 0–1.2)
BUN SERPL-MCNC: 15 MG/DL (ref 6–23)
CALCIUM SERPL-MCNC: 9.4 MG/DL (ref 8.6–10.6)
CCP IGG SERPL-ACNC: <1 U/ML
CHLORIDE SERPL-SCNC: 101 MMOL/L (ref 98–107)
CHOLEST SERPL-MCNC: 167 MG/DL (ref 0–199)
CHOLESTEROL/HDL RATIO: 2.6
CO2 SERPL-SCNC: 29 MMOL/L (ref 21–32)
CREAT SERPL-MCNC: 0.82 MG/DL (ref 0.5–1.05)
CRP SERPL-MCNC: 0.32 MG/DL
EGFRCR SERPLBLD CKD-EPI 2021: 78 ML/MIN/1.73M*2
EOSINOPHIL # BLD AUTO: 0.1 X10*3/UL (ref 0–0.7)
EOSINOPHIL NFR BLD AUTO: 0.9 %
ERYTHROCYTE [DISTWIDTH] IN BLOOD BY AUTOMATED COUNT: 12.7 % (ref 11.5–14.5)
ERYTHROCYTE [SEDIMENTATION RATE] IN BLOOD BY WESTERGREN METHOD: 11 MM/H (ref 0–30)
GLUCOSE SERPL-MCNC: 88 MG/DL (ref 74–99)
HBV CORE AB SER QL: NONREACTIVE
HBV SURFACE AB SER-ACNC: <3.1 MIU/ML
HBV SURFACE AG SERPL QL IA: NONREACTIVE
HCT VFR BLD AUTO: 43.2 % (ref 36–46)
HCV AB SER QL: NONREACTIVE
HDLC SERPL-MCNC: 63.6 MG/DL
HGB BLD-MCNC: 14.6 G/DL (ref 12–16)
HIV 1+2 AB+HIV1 P24 AG SERPL QL IA: NONREACTIVE
IMM GRANULOCYTES # BLD AUTO: 0.05 X10*3/UL (ref 0–0.7)
IMM GRANULOCYTES NFR BLD AUTO: 0.5 % (ref 0–0.9)
LDLC SERPL CALC-MCNC: 89 MG/DL
LYMPHOCYTES # BLD AUTO: 2.48 X10*3/UL (ref 1.2–4.8)
LYMPHOCYTES NFR BLD AUTO: 23.5 %
MCH RBC QN AUTO: 31.1 PG (ref 26–34)
MCHC RBC AUTO-ENTMCNC: 33.8 G/DL (ref 32–36)
MCV RBC AUTO: 92 FL (ref 80–100)
MONOCYTES # BLD AUTO: 0.74 X10*3/UL (ref 0.1–1)
MONOCYTES NFR BLD AUTO: 7 %
NEUTROPHILS # BLD AUTO: 7.17 X10*3/UL (ref 1.2–7.7)
NEUTROPHILS NFR BLD AUTO: 67.8 %
NON HDL CHOLESTEROL: 103 MG/DL (ref 0–149)
NRBC BLD-RTO: 0 /100 WBCS (ref 0–0)
PLATELET # BLD AUTO: 275 X10*3/UL (ref 150–450)
POTASSIUM SERPL-SCNC: 4.3 MMOL/L (ref 3.5–5.3)
PROT SERPL-MCNC: 7.1 G/DL (ref 6.4–8.2)
RBC # BLD AUTO: 4.7 X10*6/UL (ref 4–5.2)
RHEUMATOID FACT SER NEPH-ACNC: <10 IU/ML (ref 0–15)
SODIUM SERPL-SCNC: 141 MMOL/L (ref 136–145)
TRIGL SERPL-MCNC: 71 MG/DL (ref 0–149)
URATE SERPL-MCNC: 5.4 MG/DL (ref 2.3–6.7)
VIT B12 SERPL-MCNC: 334 PG/ML (ref 211–911)
VLDL: 14 MG/DL (ref 0–40)
WBC # BLD AUTO: 10.6 X10*3/UL (ref 4.4–11.3)

## 2024-11-14 PROCEDURE — 86706 HEP B SURFACE ANTIBODY: CPT

## 2024-11-14 PROCEDURE — 87389 HIV-1 AG W/HIV-1&-2 AB AG IA: CPT

## 2024-11-14 PROCEDURE — 1036F TOBACCO NON-USER: CPT | Performed by: INTERNAL MEDICINE

## 2024-11-14 PROCEDURE — 84550 ASSAY OF BLOOD/URIC ACID: CPT

## 2024-11-14 PROCEDURE — 36415 COLL VENOUS BLD VENIPUNCTURE: CPT

## 2024-11-14 PROCEDURE — 86704 HEP B CORE ANTIBODY TOTAL: CPT

## 2024-11-14 PROCEDURE — 86481 TB AG RESPONSE T-CELL SUSP: CPT

## 2024-11-14 PROCEDURE — 86140 C-REACTIVE PROTEIN: CPT

## 2024-11-14 PROCEDURE — 82306 VITAMIN D 25 HYDROXY: CPT

## 2024-11-14 PROCEDURE — 86431 RHEUMATOID FACTOR QUANT: CPT

## 2024-11-14 PROCEDURE — 1159F MED LIST DOCD IN RCRD: CPT | Performed by: INTERNAL MEDICINE

## 2024-11-14 PROCEDURE — 80053 COMPREHEN METABOLIC PANEL: CPT

## 2024-11-14 PROCEDURE — 86200 CCP ANTIBODY: CPT

## 2024-11-14 PROCEDURE — 82607 VITAMIN B-12: CPT

## 2024-11-14 PROCEDURE — 85652 RBC SED RATE AUTOMATED: CPT

## 2024-11-14 PROCEDURE — 87340 HEPATITIS B SURFACE AG IA: CPT

## 2024-11-14 PROCEDURE — 85025 COMPLETE CBC W/AUTO DIFF WBC: CPT

## 2024-11-14 PROCEDURE — 86803 HEPATITIS C AB TEST: CPT

## 2024-11-14 PROCEDURE — 72202 X-RAY EXAM SI JOINTS 3/> VWS: CPT

## 2024-11-14 PROCEDURE — 1160F RVW MEDS BY RX/DR IN RCRD: CPT | Performed by: INTERNAL MEDICINE

## 2024-11-14 PROCEDURE — 73130 X-RAY EXAM OF HAND: CPT | Mod: 50

## 2024-11-14 PROCEDURE — 99204 OFFICE O/P NEW MOD 45 MIN: CPT | Performed by: INTERNAL MEDICINE

## 2024-11-14 PROCEDURE — 80061 LIPID PANEL: CPT

## 2024-11-14 NOTE — PROGRESS NOTES
Initial Rheumatology Patient Visit    Chief Complaint:  Wendy Savage is a 69 y.o. female presenting today for New Patient Visit.    History of Presenting Problem:   69 y.o. female with Hx of psoriasis with disease in her hands and clap with Hx of fibromyalgia since 1990 present for evaluation.  She report most of her chronic pain is in her mid back and shoulder area, lower back, bilateral thumbs and index finger.  She reports her fibromyalgia has been well-managed with exercise  She typically take ibuprofen 600 mg as needed which helps about 80%.  She denies family hx of psoriasis.  She was started on Skyrizi by outside dermatology group but only took 2 doses and came to  for second opinion.    Problem List:   Patient Active Problem List   Diagnosis    Arthritis of hand    Elevated LDL cholesterol level    GERD (gastroesophageal reflux disease)    Hiatal hernia    Hepatic cyst    Low vitamin B12 level    Nonrheumatic aortic valve insufficiency    Osteopenia of left hip    Postmenopausal atrophic vaginitis    Psoriasis    Urethral caruncle    Vitamin D deficiency    Absence of bladder continence    Postoperative pain    S/P hysterectomy    Ascending aorta dilatation (CMS-HCC)    Carpal tunnel syndrome    Abnormal ECG    Agatston coronary artery calcium score less than 100       Past Medical History:   Past Medical History:   Diagnosis Date    Anxiety disorder, unspecified     pt denies    Aortic valve regurgitation     mild on ECHO 2021 and mild on repeat ECHO 5/2023    B12 deficiency     Chest pain     2021 - stress test no ischemia    Closed fracture of pelvis (Multi) 02/29/2016    Cystocele with second degree uterine prolapse     GERD (gastroesophageal reflux disease)     Hepatic cyst     incidentalally found on CT calcium score 2019, gallbladder U/S 2023:LIVER:  The right hepatic lobe measures 17.0 cm in length. Normal  echogenicity and contour.  Multiple cysts are present, measuring up  to 1.6 cm in the left  hepatic lobe and 1.1 cm in the right hepatic  lobe.    Hiatal hernia     Hyperlipidemia     Osteopenia     Osteoporosis     Prediabetes     12.1.23: A1C 5.9%    Psoriasis     Vitamin D deficiency     Vitamin D deficiency, unspecified        Surgical History:   Past Surgical History:   Procedure Laterality Date    CATARACT EXTRACTION, BILATERAL Bilateral 2023     SECTION, CLASSIC  2013     Section    COLONOSCOPY  2013    Colonoscopy (Fiberoptic)    DILATION AND CURETTAGE OF UTERUS  2013    Dilation And Curettage    HYSTEROSCOPY      LAPAROSCOPIC HYSTERECTOMY N/A 2024    tlh, bso, usls, posterior repair/perineorrhaphy    OTHER SURGICAL HISTORY  2013    Cervix Cryosurgery    OTHER SURGICAL HISTORY  10/12/2021    Ulnar collateral ligament of elbow reconstruction    OTHER SURGICAL HISTORY  10/12/2021    Metacarpophalangeal joint arthroplasty    OTHER SURGICAL HISTORY      urthethra carbuncle excision    ROTATOR CUFF REPAIR Right     SINUS SURGERY  2013    Sinus Surgery    SYNOVECTOMY      WRIST SURGERY Right 10/12/2021    Wrist arthroscopy/CTR/Ulnar collateral ligament of thumb rupture repair        Allergies: No Known Allergies    Medications:   Current Outpatient Medications:     b complex vitamins capsule, Take 1 capsule by mouth once daily. Country life coenzyme one cap gives 250 mcg b12 and 25 mg b6 plus others, Disp: , Rfl:     CALCIUM 26-VIT D3-MAGNESIUM 15 ORAL, Take 1 capsule by mouth once daily., Disp: , Rfl:     cholecalciferol (Vitamin D-3) 125 MCG (5000 UT) capsule, Take by mouth., Disp: , Rfl:     clobetasol (Temovate) 0.05 % external solution, Apply to the affected areas of the scalp every night for 3 weeks, then take 1 week off. Repeat as needed for flares., Disp: , Rfl:     estradiol (Estrace) 0.01 % (0.1 mg/gram) vaginal cream, Insert 0.25 Applicatorfuls (1 g) into the vagina 2 times a week., Disp: 42.5 g, Rfl: 3    ezetimibe (Zetia) 10 mg tablet,  Take 1 tablet (10 mg) by mouth once daily., Disp: 90 tablet, Rfl: 3    ibuprofen 600 mg tablet, Take 1 tablet (600 mg) by mouth every 8 hours if needed for moderate pain (4 - 6)., Disp: 90 tablet, Rfl: 1    omega 3-dha-epa-fish oil 1,000 mg (120 mg-180 mg) capsule, Take by mouth., Disp: , Rfl:     omeprazole (PriLOSEC) 20 mg DR capsule, TAKE 1 CAPSULE Daily 30 minutes before a meal if taking ibuprofen (Patient taking differently: Take 1 capsule (20 mg) by mouth if needed. TAKE 1 CAPSULE Daily 30 minutes before a meal if taking ibuprofen), Disp: 90 capsule, Rfl: 0    tacrolimus (Protopic) 0.1 % ointment, Apply to eyelids 2x daily as needed., Disp: 30 g, Rfl: 1    turmeric root extract 500 mg capsule, Take 1 capsule by mouth once daily., Disp: , Rfl:     ZINC PICOLINATE, BULK, MISC, Take 50 mg by mouth if needed., Disp: , Rfl:     betamethasone, augmented, (Diprolene) 0.05 % ointment, Apply to the affected areas of the body twice a day for 3 weeks, then take 1 week off. Repeat as needed for flares. (Patient not taking: Reported on 11/14/2024), Disp: , Rfl:     Review of Systems:   ROS: Denies Morning stiffness, joint pain and swelling, dry eyes and mouth, Hx of chronic sinusitis, Hx of ear inflammation, oral, nasal or genital ulcers, Denies sun sensitivity, Denies Raynaud's phenomenon. Denies Uveitis or recent vision changes. Denies new rashes or Hx of Psoriasis, Denies alopecia,   Denies Chest pain/SOB, cough, Hx of asthma, Denies Fever/chills/sweats, Denies Fatigue, Denies weight loss  Denies abdominal pain, New onset Dyspepsia, Denies dysphasia, Denies nausea/vomiting/diarrhea, dark/tarry stools, Denies blood in stools or urine. Denies Chronic back pain.   Denies Hx of blood clot Hx of easy bruising or bleeding. Denies Headaches, Denies current numbness and tingling, weakness.  All other systems have been reviewed and are negative for complaint.     Objective   Physical Examination:    Visit Vitals  /78    Pulse 67   Wt 64.3 kg (141 lb 12.8 oz)   BMI 27.46 kg/m²   OB Status Postmenopausal   Smoking Status Never   BSA 1.65 m²        Gen: NAD, A&O x 3  HEENT: clear sclera and conjunctiva,     Musculoskeletal:   Neck; WNL, full ROM  Shoulder: WNL, full ROM  Elbow:WNL, full ROM, no effusion noted  Wrist and fingers;no active synovitis noted, Heberden's nodes noted, no signs of dactylitis full ROM in the Wrist , Good fist and   Knees:  No effusions or crepitation, full ROM.  Hips; WNL, full ROM, Negative Antoni test  Ankle, Feet; WNL, full ROM    Skin: No rashes or lesions seen, no nail changes  Neuro: A&O x3, Normal Gait    Procedures :None    Orders:  No orders of the defined types were placed in this encounter.       Provider Impression:   Assessment/Plan   Encounter Diagnoses   Name Primary?    Psoriasis     Arthritis       69 y.o. female with Hx of psoriasis with disease in her hands and clap with Hx of fibromyalgia since 1990 present for evaluation.  On exam patient does not demonstrate any inflammatory findings concerning for psoriatic arthritis however evidence of osteoarthritis noted  -Recommend x-ray evaluation to rule out bony changes related to psoriatic arthritis  -Recommend additional serologies to rule out other inflammatory arthritis  -We had discussion about arthritis etiology and role of Biologics.  Would recommend patient continue with Skyrizi for psoriasis she does have underlying psoriatic arthritis it would know that benefit her as well.  However it would not help degenerative arthritis or metabolic related arthritis.  -Follow-up based on lab results

## 2024-11-15 RX ORDER — VITAMIN B COMPLEX
CAPSULE ORAL
Qty: 60 CAPSULE | Refills: 11 | Status: SHIPPED | OUTPATIENT
Start: 2024-11-15

## 2024-11-19 DIAGNOSIS — L40.0 PSORIASIS VULGARIS: Primary | ICD-10-CM

## 2024-11-19 RX ORDER — RISANKIZUMAB-RZAA 150 MG/ML
1 INJECTION SUBCUTANEOUS
Qty: 1 ML | Refills: 1 | Status: SHIPPED | OUTPATIENT
Start: 2024-11-19 | End: 2025-05-18

## 2024-11-20 ENCOUNTER — TELEPHONE (OUTPATIENT)
Dept: DERMATOLOGY | Facility: CLINIC | Age: 69
End: 2024-11-20
Payer: MEDICARE

## 2024-11-20 ENCOUNTER — SPECIALTY PHARMACY (OUTPATIENT)
Dept: PHARMACY | Facility: CLINIC | Age: 69
End: 2024-11-20

## 2024-11-20 NOTE — TELEPHONE ENCOUNTER
Call placed to pt, LMTCB r/t making arrangements for pt to  Meghna sample. Pt has started therapy at previous [facility] and PA is currently pending with  Specialty. [facility].

## 2024-11-21 ENCOUNTER — TELEPHONE (OUTPATIENT)
Dept: DERMATOLOGY | Facility: CLINIC | Age: 69
End: 2024-11-21
Payer: MEDICARE

## 2025-01-03 ENCOUNTER — SPECIALTY PHARMACY (OUTPATIENT)
Dept: PHARMACY | Facility: CLINIC | Age: 70
End: 2025-01-03

## 2025-01-03 PROCEDURE — RXMED WILLOW AMBULATORY MEDICATION CHARGE

## 2025-01-07 ENCOUNTER — PHARMACY VISIT (OUTPATIENT)
Dept: PHARMACY | Facility: CLINIC | Age: 70
End: 2025-01-07
Payer: COMMERCIAL

## 2025-01-09 ENCOUNTER — TELEMEDICINE CLINICAL SUPPORT (OUTPATIENT)
Dept: PHARMACY | Facility: HOSPITAL | Age: 70
End: 2025-01-09
Payer: MEDICARE

## 2025-01-09 DIAGNOSIS — L40.0 PSORIASIS VULGARIS: ICD-10-CM

## 2025-01-09 RX ORDER — RISANKIZUMAB-RZAA 150 MG/ML
1 INJECTION SUBCUTANEOUS
Qty: 1 ML | Refills: 0 | Status: SHIPPED | OUTPATIENT
Start: 2025-01-09 | End: 2025-07-08

## 2025-01-09 NOTE — PROGRESS NOTES
[facility] Clinical Note  Initial Patient Education     Introduction  Wendy Savage is a 69 y.o. female who is on the specialty pharmacy service for management of: Dermatology Core.    Wendy Savage is initiating the following therapy: Skyrizi 150mg on weeks 0 and 4, then every 12 weeks thereafter  (name, dose, directions)    Medication receipt date: 1/8/25  Duration of therapy: Maintenance    The most recent encounter visit with the referring prescriber Dr. Laila Brooks on 10/17/24 was reviewed.  Pharmacy will continue to collaborate in the care of this patient with the referring prescriber.    Clinical Background  An initial assessment was conducted prior to first fill of the medication to determine the appropriateness of therapy given the patient's diagnosis, medication list, comorbidities, allergies, medical history, patient's ability to self administer medication, and therapeutic goals based on possible outcomes of therapy. Refer to initial assessment task completed on 11/21/24.    Labs for clinical appropriateness that were reviewed include:   Dermatology- For Biologics- TB:   Lab Results   Component Value Date    TBSIN Negative 11/14/2024   , Hepatitis B panel:   Lab Results   Component Value Date    HEPBCAB Nonreactive 11/14/2024    HEPBSAG Nonreactive 11/14/2024    HEPBSAB <3.1 11/14/2024   , Hepatitis C antibody:   Lab Results   Component Value Date    HEPCAB Nonreactive 11/14/2024   , HIV:   Lab Results   Component Value Date    HIV1X2 Nonreactive 11/14/2024   , and CMP and CBC reviewed and within normal limits    Education/Discussion  Wendy was contacted on 1/9/2025 at 10:23 AM for a pharmacy visit with encounter number [id number] from:   [facility]  [address]  [place] [address]  Dept: [phone]  Dept Fax: [phone]  Loc: [phone]  Wendy consented to a/an Telephone visit, which was  "performed.    Medication Start Date (planned or actual): TBD  Education was conducted prior to start of therapy? Yes    Education discussed includes the following:     Additional details of the medication specific counseling are found within the linked patient education flowsheet.     The follow up timeline was discussed. Every person responds to and reacts to therapy differently. Patient should be assessed for efficacy and tolerability in approximately: 8-12 weeks    Provided education on goals and possible outcomes of therapy:  Adherence with therapy  Timely completion of appropriate labs  Timely and appropriate follow up with provider  Identify and address medication interactions with presciption medications, OTC medications and supplements  Optimize or maintain quality of life  Dermatology: Prevent or reduce disease flares  Reduce use of topical agents (corticosteroids or other \"prn\" agents)  Reduction of plaque size, thickness and body surface area (PSO)    The importance of adherence was discussed and they were advised to take the medication as prescribed by their provider.     Impression/Plan       This patient has not been identified as high risk due to Lack of high risk qualifiers.  The following action was taken: N/A.         The  Specialty Pharmacy Welcome packet may be viewed here:  [email or web address]       Or by scanning QR code:      Provided contact information ([phone]) for [facility] and reviewed dispensing process, refill timeline and patient management follow up. Advised to contact the pharmacy if there are any adverse effects and/or changes to medication list, including prescriptions, OTC medications, herbal products, or supplements. Confirmed understanding of education conducted during assessment. All questions and concerns were addressed and patient was " encouraged to reach out for additional questions or concerns.      Graham Trevino, PharmD

## 2025-02-13 ENCOUNTER — APPOINTMENT (OUTPATIENT)
Dept: OBSTETRICS AND GYNECOLOGY | Facility: CLINIC | Age: 70
End: 2025-02-13
Payer: MEDICARE

## 2025-03-11 ENCOUNTER — TELEPHONE (OUTPATIENT)
Dept: OBSTETRICS AND GYNECOLOGY | Facility: CLINIC | Age: 70
End: 2025-03-11
Payer: MEDICARE

## 2025-03-11 NOTE — TELEPHONE ENCOUNTER
Would like to discuss bladder concerns post-hysterectomy, discuss possible sling. Patient asked if she needs to do anything special prior to her 3/14 appointment.

## 2025-03-14 ENCOUNTER — OFFICE VISIT (OUTPATIENT)
Dept: OBSTETRICS AND GYNECOLOGY | Facility: CLINIC | Age: 70
End: 2025-03-14
Payer: MEDICARE

## 2025-03-14 VITALS — SYSTOLIC BLOOD PRESSURE: 107 MMHG | BODY MASS INDEX: 26.92 KG/M2 | DIASTOLIC BLOOD PRESSURE: 74 MMHG | WEIGHT: 139 LBS

## 2025-03-14 DIAGNOSIS — N39.9 URINARY DISORDER: Primary | ICD-10-CM

## 2025-03-14 LAB
POC APPEARANCE, URINE: CLEAR
POC BILIRUBIN, URINE: NEGATIVE
POC BLOOD, URINE: NEGATIVE
POC COLOR, URINE: YELLOW
POC GLUCOSE, URINE: NEGATIVE MG/DL
POC KETONES, URINE: NEGATIVE MG/DL
POC LEUKOCYTES, URINE: NEGATIVE
POC NITRITE,URINE: NEGATIVE
POC PH, URINE: 6 PH
POC PROTEIN, URINE: NEGATIVE MG/DL
POC SPECIFIC GRAVITY, URINE: 1.02
POC UROBILINOGEN, URINE: 0.2 EU/DL

## 2025-03-14 PROCEDURE — 99214 OFFICE O/P EST MOD 30 MIN: CPT | Performed by: OBSTETRICS & GYNECOLOGY

## 2025-03-14 PROCEDURE — 1159F MED LIST DOCD IN RCRD: CPT | Performed by: OBSTETRICS & GYNECOLOGY

## 2025-03-14 PROCEDURE — 99214 OFFICE O/P EST MOD 30 MIN: CPT | Mod: 25 | Performed by: OBSTETRICS & GYNECOLOGY

## 2025-03-14 PROCEDURE — 1126F AMNT PAIN NOTED NONE PRSNT: CPT | Performed by: OBSTETRICS & GYNECOLOGY

## 2025-03-14 PROCEDURE — 81003 URINALYSIS AUTO W/O SCOPE: CPT | Mod: QW | Performed by: OBSTETRICS & GYNECOLOGY

## 2025-03-14 RX ORDER — BETAMETHASONE DIPROPIONATE 0.5 MG/G
1 OINTMENT, AUGMENTED TOPICAL AS NEEDED
COMMUNITY
Start: 2025-01-03

## 2025-03-14 ASSESSMENT — PAIN SCALES - GENERAL: PAINLEVEL_OUTOF10: 0-NO PAIN

## 2025-03-14 NOTE — PROGRESS NOTES
Urogynecology  Provider:  Zeynep Campbell MD  [phone]              ASSESSMENT AND PLAN:   69-year-old female being assessed for POP.    Diagnoses:  #1 POP    Plan:  POP  - Watchful waiting is recommended as the prolapse is not severe and is not protruding past the vaginal opening.  - Consideration of a pessary to manage sxs until a more convenient time for surgical intervention, if necessary.  - Discussed the option of surgical intervention with mesh if the prolapse worsens or becomes symptomatic.  - Educated her on signs of worsening prolapse and advised on lifestyle modifications to reduce strain on pelvic tissues.  - POP-Q: Aa: 0, C: -7, TVL: 9, Ap: -3, D: x    Follow-up with Dr. Campbell    Scribe Attestation  By signing my name below, INeymar Scribe, attest that this documentation has been prepared under the direction and in the presence of Zeynep Campbell MD on 03/14/2025 at 7:32 AM.    Agree with above. I Dr. Campbell, personally performed the services described in the documentation which was scribed virtually and confirm it is both complete and accurate.  Zeynep Campbell MD      Problem List Items Addressed This Visit    None          I spent a total of eConsult Time: 30 minutes in face to face and non face to face time.        Zeynep Campbell MD        HISTORY OF PRESENT ILLNESS:     Last visit 7/2024  Having concerns about bladder POP and incontinence    s/p laparoscopic total hysterectomy, BSO, salpingectomy-oophorectomy, USLS, posterior repair, perineorrhaphy, and cystoscopy on 5/24/2024.          Prolapse Symptoms :  - She reports a sensation of something 'falling out,' similar to sxs experienced last winter while skiing.  - She is concerned about the possibility of needing another surgery or using a pessary as a temporary solution.    Sexual Activity:   - She is still sexually active, though not as frequently.         Past Medical History:      Past Medical History:    Diagnosis Date    Anxiety disorder, unspecified     pt denies    Aortic valve regurgitation     mild on ECHO  and mild on repeat ECHO 2023    B12 deficiency     Chest pain      - stress test no ischemia    Closed fracture of pelvis (Multi) 2016    Cystocele with second degree uterine prolapse     GERD (gastroesophageal reflux disease)     Hepatic cyst     incidentalally found on CT calcium score , gallbladder U/S :LIVER:  The right hepatic lobe measures 17.0 cm in length. Normal  echogenicity and contour.  Multiple cysts are present, measuring up  to 1.6 cm in the left hepatic lobe and 1.1 cm in the right hepatic  lobe.    Hiatal hernia     Hyperlipidemia     Osteopenia     Osteoporosis     Prediabetes     23: A1C 5.9%    Psoriasis     Vitamin D deficiency     Vitamin D deficiency, unspecified           Past Surgical History:       Past Surgical History:   Procedure Laterality Date    CATARACT EXTRACTION, BILATERAL Bilateral 2023     SECTION, CLASSIC  2013     Section    COLONOSCOPY  2013    Colonoscopy (Fiberoptic)    DILATION AND CURETTAGE OF UTERUS  2013    Dilation And Curettage    HYSTEROSCOPY      LAPAROSCOPIC HYSTERECTOMY N/A 2024    tlh, bso, usls, posterior repair/perineorrhaphy    OTHER SURGICAL HISTORY  2013    Cervix Cryosurgery    OTHER SURGICAL HISTORY  10/12/2021    Ulnar collateral ligament of elbow reconstruction    OTHER SURGICAL HISTORY  10/12/2021    Metacarpophalangeal joint arthroplasty    OTHER SURGICAL HISTORY      urthethra carbuncle excision    ROTATOR CUFF REPAIR Right     SINUS SURGERY  2013    Sinus Surgery    SYNOVECTOMY      WRIST SURGERY Right 10/12/2021    Wrist arthroscopy/CTR/Ulnar collateral ligament of thumb rupture repair         Medications:       Prior to Admission medications    Medication Sig Start Date End Date Taking? Authorizing Provider   b complex vitamins capsule Country life coenzyme  one cap gives 250 mcg b12 and 25 mg b6 plus others-take 2 per day starting 11-16-24 11/15/24   Maxine Fernández MD   CALCIUM 26-VIT D3-MAGNESIUM 15 ORAL Take 1 capsule by mouth once daily. 7/21/21   Historical Provider, MD   cholecalciferol (Vitamin D-3) 125 MCG (5000 UT) capsule Take by mouth. 4/19/17   Historical Provider, MD   clobetasol (Temovate) 0.05 % external solution Apply to the affected areas of the scalp every night for 3 weeks, then take 1 week off. Repeat as needed for flares. 1/18/24   Historical Provider, MD   estradiol (Estrace) 0.01 % (0.1 mg/gram) vaginal cream Insert 0.25 Applicatorfuls (1 g) into the vagina 2 times a week. 6/13/24 6/13/25  RACHANA Burnett-CNP   ezetimibe (Zetia) 10 mg tablet Take 1 tablet (10 mg) by mouth once daily. 8/14/24 8/14/25  Maxine Fernández MD   ibuprofen 600 mg tablet Take 1 tablet (600 mg) by mouth every 8 hours if needed for moderate pain (4 - 6). 1/8/25   Maxine Fernández MD   omega 3-dha-epa-fish oil 1,000 mg (120 mg-180 mg) capsule Take by mouth.    Historical Provider, MD   omeprazole (PriLOSEC) 20 mg DR capsule TAKE 1 CAPSULE Daily 30 minutes before a meal if taking ibuprofen  Patient taking differently: Take 1 capsule (20 mg) by mouth if needed. TAKE 1 CAPSULE Daily 30 minutes before a meal if taking ibuprofen 1/23/24   Maxine Fernández MD   risankizumab-rzaa (Skyrizi) 150 mg/mL pen injector pen Inject 1 mL (150 mg) under the skin every 12 weeks. 1/9/25 7/8/25  Laila Brooks DO   tacrolimus (Protopic) 0.1 % ointment Apply to eyelids 2x daily as needed. 10/17/24   Laila Brooks DO   turmeric root extract 500 mg capsule Take 1 capsule by mouth once daily. 7/21/21   Historical Provider, MD   ZINC PICOLINATE, BULK, MISC Take 50 mg by mouth if needed.    Historical Provider, MD LINDSAY  Review of Systems   Constitutional: Negative.    HENT: Negative.     Eyes: Negative.    Respiratory: Negative.     Cardiovascular: Negative.    Gastrointestinal:  "Negative.    Endocrine: Negative.    Genitourinary: Negative.    Musculoskeletal: Negative.    Neurological: Negative.    Psychiatric/Behavioral: Negative.          Blood, Urine   Date Value Ref Range Status   04/26/2024 NEGATIVE NEGATIVE Final     POC Blood, Urine   Date Value Ref Range Status   07/08/2024 TRACE-Intact (A) NEGATIVE Final     Poc Nitrite, Urine   Date Value Ref Range Status   07/08/2024 NEGATIVE NEGATIVE Final     Urobilinogen, Urine   Date Value Ref Range Status   04/26/2024 0.2 0.2, 1.0 mg/dL Final     POC Urobilinogen, Urine   Date Value Ref Range Status   07/08/2024 0.2 0.2, 1.0 EU/DL Final     POC Leukocytes, Urine   Date Value Ref Range Status   07/08/2024 NEGATIVE NEGATIVE Final         PHYSICAL EXAM:      There were no vitals taken for this visit.     No LMP recorded. Patient is postmenopausal.      Declines chaperone for physical exam.    PVR= 3 mL US    Well developed, well nourished, in no apparent distress.   Neurologic/Psychiatric:  Awake, Alert and Oriented times 3.  Affect normal.     GENITAL/URINARY:       External Genitalia:  The patient has normal appearing external genitalia, normal skenes and bartholins glands, and a normal hair distribution.  Her vulva is without lesions, erythema or discharge.  It is non-tender with appropriate sensation.     Urethral Meatus:  Size normal, Location normal, Lesions absent, Prolapse absent,      Urethra:  Fullness absent, Masses absent,      Bladder:  Fullness absent, Masses absent, Tenderness absent,      Vagina:  General appearance normal, Estrogen effect normal, Discharge absent, Lesions absent     Cervix: Normal, no discharge.       POP-Q:  Position: standing    Aa: 0       Ba:  C: -7   Gh:  Pb:  TVL: 9         Ap: -3 Bp:  D: x               Data and DIAGNOSTIC STUDIES REVIEWED   No results found.   No results found for: \"URINECULTURE\", \"UAMICCOMM\"   Lab Results   Component Value Date    GLUCOSE 88 11/14/2024    CALCIUM 9.4 11/14/2024    NA " 141 11/14/2024    K 4.3 11/14/2024    CO2 29 11/14/2024     11/14/2024    BUN 15 11/14/2024    CREATININE 0.82 11/14/2024     Lab Results   Component Value Date    WBC 10.6 11/14/2024    HGB 14.6 11/14/2024    HCT 43.2 11/14/2024    MCV 92 11/14/2024     11/14/2024          Zeynep Campbell MD

## 2025-03-15 ASSESSMENT — ENCOUNTER SYMPTOMS
MUSCULOSKELETAL NEGATIVE: 1
CARDIOVASCULAR NEGATIVE: 1
PSYCHIATRIC NEGATIVE: 1
EYES NEGATIVE: 1
RESPIRATORY NEGATIVE: 1
NEUROLOGICAL NEGATIVE: 1
ENDOCRINE NEGATIVE: 1
GASTROINTESTINAL NEGATIVE: 1
CONSTITUTIONAL NEGATIVE: 1

## 2025-03-25 ENCOUNTER — SPECIALTY PHARMACY (OUTPATIENT)
Dept: PHARMACY | Facility: CLINIC | Age: 70
End: 2025-03-25

## 2025-03-25 PROCEDURE — RXMED WILLOW AMBULATORY MEDICATION CHARGE

## 2025-03-26 ENCOUNTER — PHARMACY VISIT (OUTPATIENT)
Dept: PHARMACY | Facility: CLINIC | Age: 70
End: 2025-03-26
Payer: COMMERCIAL

## 2025-04-03 ENCOUNTER — APPOINTMENT (OUTPATIENT)
Dept: OBSTETRICS AND GYNECOLOGY | Facility: CLINIC | Age: 70
End: 2025-04-03
Payer: MEDICARE

## 2025-05-08 ENCOUNTER — SPECIALTY PHARMACY (OUTPATIENT)
Dept: PHARMACY | Facility: CLINIC | Age: 70
End: 2025-05-08

## 2025-05-08 NOTE — PROGRESS NOTES
"[facility] Clinical Note  Patient Reassessment     Introduction  Wendy Savage is a 70 y.o. female who is on the specialty pharmacy service for management of: Dermatology Core.      [facility] supplied medication: risankizumab-rzaa (Skyrizi) 150 mg/mL pen injector pen   Inject 1 pen (150 mg) under the skin every 12 weeks.        Duration of therapy: Maintenance    The most recent encounter visit with the referring prescriber Laila Brooks DO on 10/17/24 was reviewed.  Pharmacy will continue to collaborate in the care of this patient with the referring prescriber.    Discussion  Wendy was contacted on 5/8/2025 at 9:37 AM for a pharmacy visit with encounter number [id number] from:   [facility]  [address] [place] [id number]  Dept: [phone]  Dept Fax: [phone]  Wendy consented to a/an Telephone visit, which was performed.    Efficacy  Patient has developed new symptoms of condition: No  Patient/caregiver feels medication is affecting the disease state: Patient reports seeing a little improvement since starting Skyrizi. States about 30% improvement overall at this time. Patient does deny any recent flares and reports still needing to use topical agents daily.    Goals  Provided education on goals and possible outcomes of therapy:  Adherence with therapy  Timely completion of appropriate labs  Timely and appropriate follow up with provider  Identify and address medication interactions with presciption medications, OTC medications and supplements  Optimize or maintain quality of life  Dermatology: Prevent or reduce disease flares  Reduce use of topical agents (corticosteroids or other \"prn\" agents)  Reduction of plaque size, thickness and body surface area (PSO)  Patient has documented target(s) for goals of therapy: Yes    Targets       Target Due Completed Completed By Outcome Source     Goal: Reduce use of ancillary or \"prn\" medications " 5/9/2025 -- -- -- Clinical Management - 1/3/2025         Goal: Prevent and reduce disease flares 9/8/2025 -- -- -- Clinical Management - 1/3/2025         Goal: Prevent and reduce disease flares 5/9/2025 5/8/2025 Alexia Fall, JensD On Track Clinical Management - 1/3/2025    Patient denies any recent flares.              Tolerance  Patient has experienced side effects from this medication: No  Changes to current therapy regimen: No    The follow-up timeline was discussed. Every person responds to and reacts to therapy differently. Patient should be assessed for efficacy and tolerability in approximately: other - 4 months            Adherence  Patient Information  Informant: Self (Patient)  Demonstrates Understanding of Importance of Adherence: Yes  Does the patient have any barriers to self-administration (including physical and mental?): No  Medication Information  Medication: risankizumab-rzaa (Skyrizi)  Patient Reported Missed Doses in the Last 4 Weeks: 0  Estimated Medication Adherence Level: Good  Adherence Estimation Source: Claims history  Barriers to Adherence: No Problems identified   The importance of adherence was discussed and patient/caregiver was advised to take the medication as prescribed by their provider. Encouraged patient/caregiver to call physician's office or specialty pharmacy if they have a question regarding a missed dose.    General Assessment  Changes to home medications, OTCs or supplements: No  Current Medications[1]  Reported new allergies: No  Reported new medical conditions: No  Additional monitoring reviewed: Dermatology- For Biologics- TB:   Lab Results   Component Value Date    TBSIN Negative 11/14/2024     Is laboratory follow up needed? No    Advised to contact the pharmacy if there are any changes to the patient's medication list, including prescriptions, OTC medications, herbal products, or supplements.    Impression/Plan  This patient has been identified as high risk due to  Geriatric (over 65 years of age).  The following action was taken:Patient/caregiver encouraged to participate in patient management program          QOL/Patient Satisfaction  Rate your quality of life on scale of 1-10: 9  Rate your satisfaction with  Specialty Pharmacy on scale of 1-10: 9    Provided contact information ([phone]) for [facility] and reviewed dispensing process, refill timeline and patient management follow up. Confirmed understanding of education conducted during assessment. All questions and concerns were addressed and patient/caregiver was encouraged to reach out for additional questions or concerns.    Based on the patient's diagnosis, medication list, progress towards goals, adherence, tolerance, and medication list, medication remains appropriate: Therapy remains appropriate (I attest)    Alexia Fall, PharmD         [1]   Current Outpatient Medications   Medication Sig Dispense Refill    b complex vitamins capsule Country life coenzyme one cap gives 250 mcg b12 and 25 mg b6 plus others-take 2 per day starting 11-16-24 60 capsule 11    betamethasone, augmented, (Diprolene) 0.05 % ointment Apply 1 Application topically if needed (flares).      CALCIUM 26-VIT D3-MAGNESIUM 15 ORAL Take 1 capsule by mouth once daily.      cholecalciferol (Vitamin D-3) 125 MCG (5000 UT) capsule Take by mouth.      clobetasol (Temovate) 0.05 % external solution Apply to the affected areas of the scalp every night for 3 weeks, then take 1 week off. Repeat as needed for flares.      estradiol (Estrace) 0.01 % (0.1 mg/gram) vaginal cream Insert 0.25 Applicatorfuls (1 g) into the vagina 2 times a week. 42.5 g 3    ezetimibe (Zetia) 10 mg tablet Take 1 tablet (10 mg) by mouth once daily. 90 tablet 3    ibuprofen 600 mg tablet Take 1 tablet (600 mg) by mouth every 8 hours if needed for moderate pain (4 - 6). 90 tablet 1    omega 3-dha-epa-fish oil 1,000 mg (120 mg-180 mg) capsule Take by  mouth.      omeprazole (PriLOSEC) 20 mg DR capsule TAKE 1 CAPSULE Daily 30 minutes before a meal if taking ibuprofen 90 capsule 0    risankizumab-rzaa (Skyrizi) 150 mg/mL pen injector pen Inject 1 pen (150 mg) under the skin every 12 weeks. 1 mL 0    tacrolimus (Protopic) 0.1 % ointment Apply to eyelids 2x daily as needed. 30 g 1    turmeric root extract 500 mg capsule Take 1 capsule by mouth once daily.      ZINC PICOLINATE, BULK, MISC Take 50 mg by mouth if needed.       No current facility-administered medications for this visit.

## 2025-06-14 ENCOUNTER — SPECIALTY PHARMACY (OUTPATIENT)
Dept: PHARMACY | Facility: CLINIC | Age: 70
End: 2025-06-14

## 2025-06-20 ENCOUNTER — TELEPHONE (OUTPATIENT)
Dept: DERMATOLOGY | Facility: CLINIC | Age: 70
End: 2025-06-20
Payer: MEDICARE

## 2025-06-20 NOTE — TELEPHONE ENCOUNTER
Pt LM stating she needed an appt with RP to be able to obtain refills of Skyrizi. LMTCB r/t scheduling. I did schedule pt 6/25/24 at 9a, howevver told pt to call office or respond to [facility] message if that time did not work for her. [employer] message sent with other dates/times.

## 2025-06-25 ENCOUNTER — APPOINTMENT (OUTPATIENT)
Dept: DERMATOLOGY | Facility: CLINIC | Age: 70
End: 2025-06-25
Payer: MEDICARE

## 2025-06-25 DIAGNOSIS — L40.0 PSORIASIS VULGARIS: Primary | ICD-10-CM

## 2025-06-25 DIAGNOSIS — L40.0 PSORIASIS VULGARIS: ICD-10-CM

## 2025-06-25 PROCEDURE — 1036F TOBACCO NON-USER: CPT | Performed by: DERMATOLOGY

## 2025-06-25 PROCEDURE — 1159F MED LIST DOCD IN RCRD: CPT | Performed by: DERMATOLOGY

## 2025-06-25 PROCEDURE — G2211 COMPLEX E/M VISIT ADD ON: HCPCS | Performed by: DERMATOLOGY

## 2025-06-25 PROCEDURE — 99213 OFFICE O/P EST LOW 20 MIN: CPT | Performed by: DERMATOLOGY

## 2025-06-25 RX ORDER — RISANKIZUMAB-RZAA 150 MG/ML
1 INJECTION SUBCUTANEOUS
Qty: 1 ML | Refills: 2 | Status: SHIPPED | OUTPATIENT
Start: 2025-06-25 | End: 2025-06-25 | Stop reason: SDUPTHER

## 2025-06-25 RX ORDER — RISANKIZUMAB-RZAA 150 MG/ML
1 INJECTION SUBCUTANEOUS
Qty: 1 ML | Refills: 2 | Status: SHIPPED | OUTPATIENT
Start: 2025-06-25 | End: 2026-03-04

## 2025-06-25 ASSESSMENT — DERMATOLOGY QUALITY OF LIFE (QOL) ASSESSMENT
RATE HOW BOTHERED YOU ARE BY EFFECTS OF YOUR SKIN PROBLEMS ON YOUR ACTIVITIES (EG, GOING OUT, ACCOMPLISHING WHAT YOU WANT, WORK ACTIVITIES OR YOUR RELATIONSHIPS WITH OTHERS): 0 - NEVER BOTHERED
ARE THERE EXCLUSIONS OR EXCEPTIONS FOR THE QUALITY OF LIFE ASSESSMENT: NO
RATE HOW BOTHERED YOU ARE BY SYMPTOMS OF YOUR SKIN PROBLEM (EG, ITCHING, STINGING BURNING, HURTING OR SKIN IRRITATION): 1
WHAT SINGLE SKIN CONDITION LISTED BELOW IS THE PATIENT ANSWERING THE QUALITY-OF-LIFE ASSESSMENT QUESTIONS ABOUT: PSORIASIS
RATE HOW EMOTIONALLY BOTHERED YOU ARE BY YOUR SKIN PROBLEM (FOR EXAMPLE, WORRY, EMBARRASSMENT, FRUSTRATION): 0 - NEVER BOTHERED

## 2025-06-25 ASSESSMENT — DERMATOLOGY PATIENT ASSESSMENT
FOR PATIENTS COMING IN FOR A FOLLOW-UP VISIT - HAVE THERE BEEN ANY CHANGES IN YOUR HEALTH SINCE YOUR LAST VISIT: ALL IN MYCHART
ARE YOU AN ORGAN TRANSPLANT RECIPIENT: NO
DO YOU HAVE ANY NEW OR CHANGING LESIONS: NO
HAVE YOU HAD OR DO YOU HAVE A STAPH INFECTION: NO
ARE YOU ON BIRTH CONTROL: NO
HAVE YOU HAD OR DO YOU HAVE VASCULAR DISEASE: NO
DO YOU HAVE IRREGULAR MENSTRUAL CYCLES: NO
DO YOU USE A TANNING BED: NO
ARE YOU TRYING TO GET PREGNANT: NO

## 2025-06-25 ASSESSMENT — PHYSICIAN GLOBAL ASSESSMENT (PGA): WHAT IS THE PGA: PHYSICIAN GLOBAL ASSESSMENT:  1 - MINIMAL

## 2025-06-25 ASSESSMENT — BODY SURFACE AREA (BSA): WHAT IS THE BSA PERCENTAGE: < 3% BODY SURFACE AREA INVOLVED

## 2025-06-25 ASSESSMENT — ITCH NUMERIC RATING SCALE: HOW SEVERE IS YOUR ITCHING?: 2

## 2025-06-25 ASSESSMENT — PATIENT GLOBAL ASSESSMENT (PGA): PATIENT GLOBAL ASSESSMENT: PATIENT GLOBAL ASSESSMENT:  2 - MILD

## 2025-06-25 NOTE — PROGRESS NOTES
Subjective     Wendy Savage is a 70 y.o. female who presents for the following: Psoriasis (Pt here for follow up on Psoriasis, located scalp and scattered areas on body. Currently using Skyrizi 150mg every 12 weeks. She does have topicals if needed Tacrolimus 0.1% ointment, Clobetasol 0.05% solution, Betamethasone augmented 0.05% ointment. Psoriasis has improved overall, slightly itchy on body. Labs & T-spot-11/2024. Pt denies any recurrent illnesses or infections.  ).     Intake Questions  Do you have any new or changing Lesions?: No  Where are these new or changing lesion(s) located?: NA  For patients coming in for a Follow-up Visit:  Have there been any changes in your health since your last visit?: all in mychart  Are you an organ transplant recipient?: No  Have you had or do you have a Staph Infection?: No  Have you had or do you have Vacular Disease?: No  Do you use a tanning bed?: No  Are you trying to get pregnant?: No  Are you on birth control?: No  Do you have irregular menstrual cycles?: No    Review of Systems:  No other skin or systemic complaints other than what is documented elsewhere in the note.    The following portions of the chart were reviewed this encounter and updated as appropriate:          Skin Cancer History  Biopsy Log Book  No skin cancers from Specimen Tracking.    Additional History      Specialty Problems          Dermatology Problems    Psoriasis        Objective   Well appearing patient in no apparent distress; mood and affect are within normal limits.    A focused skin examination was performed of the face, upper extremities. All findings within normal limits unless otherwise noted below.    Assessment/Plan   Skin Exam  1. PSORIASIS VULGARIS  Generalized  Pink scaly patches on the elbows, left knee  BSA 3%  The chronic and intermittently flaring nature of this skin condition was discussed today.   This is an inflammatory skin condition that occurs in a bimodal age distribution in  genetically pre-disposed individuals. The inflammatory skin condition causes skin to turn over 2x faster than normal skin. The various treatment options were reviewed which include: topical steroids, topical retinoids, nb-UVB therapy, systemic medications including oral medications and injectable (biologic) medications.       Well controlled on Skyrizi injections every 12 weeks, last injection 5/8/25 and due 7/28/25. Discussed that this medication does suppress the immune system, need for baseline bloodwork,  and repeat routine blood work is monitored yearly.    After starting a biologic treatment follow-up visits are every 4-6 months.    The side effects of therapy were reviewed including increased risk of infection, local injection site reaction, potential increased risk of malignancy.     In addition skyrizi side effects include upper respiratory tract infections.    Plan to continue Skyrizi - labs due 12/2025, will order at her next follow up appt.  Reports joints arthritis more consistent with osteoarthritis did see rheumatology previously    Continue clobetasol scalp solution. Patient to apply 2x daily x 2 wks then decrease to 2x/day 2 days per week. Can repeat full 2 week course as often as every 6-8 weeks as needed for flaring. Side effects of topical steroids includes, but is not limited to skin atrophy and dyspigmentation.    Tacrolimus 0.1% ointment 2x daily to eyelids as needed  Related Procedures  Follow Up In Dermatology - Established Patient  Related Medications  tacrolimus (Protopic) 0.1 % ointment  Apply to eyelids 2x daily as needed.  risankizumab-rzaa (Skyrizi) 150 mg/mL pen injector pen  Inject 1 pen (150 mg) under the skin every 12 weeks.    Follow up in 6 months for continued management of this complex condition on biologic therapy.

## 2025-06-25 NOTE — Clinical Note
The chronic and intermittently flaring nature of this skin condition was discussed today.   This is an inflammatory skin condition that occurs in a bimodal age distribution in genetically pre-disposed individuals. The inflammatory skin condition causes skin to turn over 2x faster than normal skin. The various treatment options were reviewed which include: topical steroids, topical retinoids, nb-UVB therapy, systemic medications including oral medications and injectable (biologic) medications.       Well controlled on Skyrizi injections every 12 weeks, last injection 5/8/25 and due 7/28/25. Discussed that this medication does suppress the immune system, need for baseline bloodwork,  and repeat routine blood work is monitored yearly.    After starting a biologic treatment follow-up visits are every 4-6 months.    The side effects of therapy were reviewed including increased risk of infection, local injection site reaction, potential increased risk of malignancy.     In addition skyrizi side effects include upper respiratory tract infections.    Plan to continue Skyrizi - labs due 12/2025, will order at her next follow up appt.  Reports joints arthritis more consistent with osteoarthritis did see rheumatology previously    Continue clobetasol scalp solution. Patient to apply 2x daily x 2 wks then decrease to 2x/day 2 days per week. Can repeat full 2 week course as often as every 6-8 weeks as needed for flaring. Side effects of topical steroids includes, but is not limited to skin atrophy and dyspigmentation.    Tacrolimus 0.1% ointment 2x daily to eyelids as needed

## 2025-06-29 ENCOUNTER — SPECIALTY PHARMACY (OUTPATIENT)
Dept: PHARMACY | Facility: CLINIC | Age: 70
End: 2025-06-29

## 2025-06-29 PROCEDURE — RXMED WILLOW AMBULATORY MEDICATION CHARGE

## 2025-07-13 NOTE — PROGRESS NOTES
Urogynecology  Provider:  Zeynep Campbell MD  [phone]      ASSESSMENT AND PLAN:   70 y.o. female being assessed for prolapse recurrence.     Diagnoses:   #1 Anterior vaginal wall prolapse    Plan:   1. Anterior vaginal wall prolapse, recurrent   - S/p laparoscopic total hysterectomy, BSO, salpingectomy-oophorectomy, USLS, posterior repair, perineorrhaphy, and cystoscopy on 5/24/2024.    - Counseled patient on revising the POP surgically. We will re-attach the anterior wall to the mesh. Surgery is outpatient. Recovery takes about 2 weeks. There is a 5% chance it can't be done robotically and we need to do the surgery through a larger incision.   - Restart tv estrogen cream 3 nights per week.   - She does desire to proceed with surgical repair, likely sometime in September/October.   - I have placed a request with Hermila.      Follow-up in late August with Dr. Campbell for a virtual visit & pre op counseling.     Scribe Attestation:   I, Carole Chand, am scribing for virtually, and in the presence of Zeynep Campbell MD on 07/14/2025 at 4:35 PM.     Agree with above. I Dr. Campbell, personally performed the services described in the documentation which was scribed virtually and confirm it is both complete and accurate.  Zeynep Campbell MD        Problem List Items Addressed This Visit    None          I spent a total of eConsult Time: 35 minutes in face to face and non face to face time.        Zeynep Campbell MD        HISTORY OF PRESENT ILLNESS:   Wendy Savage is a 70 y.o. female who presents for follow up on prolapse.     Last visit 3/2025  69-year-old female being assessed for POP.     Diagnoses:  #1 POP     Plan:  POP  - Watchful waiting is recommended as the prolapse is not severe and is not protruding past the vaginal opening.  - Consideration of a pessary to manage sxs until a more convenient time for surgical intervention, if necessary.  - Discussed the option of surgical intervention  with mesh if the prolapse worsens or becomes symptomatic.  - Educated her on signs of worsening prolapse and advised on lifestyle modifications to reduce strain on pelvic tissues.  - POP-Q: Aa: 0, C: -7, TVL: 9, Ap: -3, D: x     Follow-up with Dr. Campbell       Prolapse Symptoms:  - The patient reports that the bulge is falling down on a daily basis.     Urinary Symptoms:   - No urinary symptoms:        Past Medical History:      Medical History[1]       Past Surgical History:       Surgical History[2]      Medications:       Prior to Admission medications    Medication Sig Start Date End Date Taking? Authorizing Provider   b complex vitamins capsule [employer] life coenzyme one cap gives 250 mcg b12 and 25 mg b6 plus others-take 2 per day starting 11-16-24 11/15/24   Maxine Fernández MD   betamethasone, augmented, (Diprolene) 0.05 % ointment Apply 1 Application topically if needed (flares). 1/3/25   Historical Provider, MD   CALCIUM 26-VIT D3-MAGNESIUM 15 ORAL Take 1 capsule by mouth once daily. 7/21/21   Historical Provider, MD   cholecalciferol (Vitamin D-3) 125 MCG (5000 UT) capsule Take by mouth. 4/19/17   Historical Provider, MD   clobetasol (Temovate) 0.05 % external solution Apply to the affected areas of the scalp every night for 3 weeks, then take 1 week off. Repeat as needed for flares. 1/18/24   Historical Provider, MD   estradiol (Estrace) 0.01 % (0.1 mg/gram) vaginal cream Insert 0.25 Applicatorfuls (1 g) into the vagina 2 times a week. 6/13/24 6/13/25  RACHANA Burnett-CNP   ezetimibe (Zetia) 10 mg tablet Take 1 tablet (10 mg) by mouth once daily. 8/14/24 8/14/25  Maxine Fernández MD   ibuprofen 600 mg tablet Take 1 tablet (600 mg) by mouth every 8 hours if needed for moderate pain (4 - 6). 1/8/25   Maxine Fernández MD   omega 3-dha-epa-fish oil 1,000 mg (120 mg-180 mg) capsule Take by mouth.    Historical Provider, MD   omeprazole (PriLOSEC) 20 mg DR capsule TAKE 1 CAPSULE Daily 30 minutes before  a meal if taking ibuprofen 1/23/24   Maxine Fernández MD   risankizumab-rzaa (Skyrizi) 150 mg/mL pen injector pen Inject 1 pen (150 mg) under the skin every 12 weeks. 6/25/25 3/4/26  Laila Brooks DO   tacrolimus (Protopic) 0.1 % ointment Apply to eyelids 2x daily as needed. 10/17/24   Laila Brooks DO   turmeric root extract 500 mg capsule Take 1 capsule by mouth once daily. 7/21/21   Historical Provider, MD   ZINC PICOLINATE, BULK, MISC Take 50 mg by mouth if needed.    Historical Provider, MD LINDSAY  Review of Systems     Blood, Urine   Date Value Ref Range Status   04/26/2024 NEGATIVE NEGATIVE Final     POC Blood, Urine   Date Value Ref Range Status   03/14/2025 NEGATIVE NEGATIVE Final     Poc Nitrite, Urine   Date Value Ref Range Status   03/14/2025 NEGATIVE NEGATIVE Final     Urobilinogen, Urine   Date Value Ref Range Status   04/26/2024 0.2 0.2, 1.0 mg/dL Final     POC Urobilinogen, Urine   Date Value Ref Range Status   03/14/2025 0.2 0.2, 1.0 EU/DL Final     POC Leukocytes, Urine   Date Value Ref Range Status   03/14/2025 NEGATIVE NEGATIVE Final         PHYSICAL EXAM:      There were no vitals taken for this visit.     No LMP recorded. Patient is postmenopausal.      Declines chaperone for physical exam.      Well developed, well nourished, in no apparent distress.   Neurologic/Psychiatric:  Awake, Alert and Oriented times 3.  Affect normal.     GENITAL/URINARY:       External Genitalia:  The patient has normal appearing external genitalia, normal skenes and bartholins glands, and a normal hair distribution.  Her vulva is without lesions, erythema or discharge.  It is non-tender with appropriate sensation.     Urethral Meatus:  Size normal, Location normal, Lesions absent, Prolapse absent,      Urethra:  Fullness absent, Masses absent,      Bladder:  Fullness absent, Masses absent, Tenderness absent,      Vagina:  General appearance normal, Discharge absent, Lesions absent,      Cervix:  surgically  "absent   Uterus:  surgically absent     Anus/Perineum:  Lesions absent and Masses absent defer exam  Normal Perineum      POP-Q:  Position: standing    Aa: +3       Ba: +3 C: -9   Gh:  Pb:  TVL: 9         Ap: -3 Bp:  D: x               Data and DIAGNOSTIC STUDIES REVIEWED   Imaging  No results found.    Cardiology, Vascular, and Other Imaging  No other imaging results found for the past 7 days     No results found for: \"URINECULTURE\", \"[employer]\"   Lab Results   Component Value Date    GLUCOSE 88 2024    CALCIUM 9.4 2024     2024    K 4.3 2024    CO2 29 2024     2024    BUN 15 2024    CREATININE 0.82 2024     Lab Results   Component Value Date    WBC 10.6 2024    HGB 14.6 2024    HCT 43.2 2024    MCV 92 2024     2024          Zeynep Campbell MD               [1]   Past Medical History:  Diagnosis Date    Anxiety disorder, unspecified     pt denies    Aortic valve regurgitation     mild on ECHO  and mild on repeat ECHO 2023    B12 deficiency     Chest pain      - stress test no ischemia    Closed fracture of pelvis (Multi) 2016    Cystocele with second degree uterine prolapse     GERD (gastroesophageal reflux disease)     Hepatic cyst     incidentalally found on CT calcium score 2019, gallbladder U/S :LIVER:  The right hepatic lobe measures 17.0 cm in length. Normal  echogenicity and contour.  Multiple cysts are present, measuring up  to 1.6 cm in the left hepatic lobe and 1.1 cm in the right hepatic  lobe.    Hiatal hernia     Hyperlipidemia     Osteopenia     Osteoporosis     Prediabetes     .23: A1C 5.9%    Psoriasis     Vitamin D deficiency     Vitamin D deficiency, unspecified    [2]   Past Surgical History:  Procedure Laterality Date    CATARACT EXTRACTION, BILATERAL Bilateral 2023     SECTION, CLASSIC  2013     Section    COLONOSCOPY  2013    Colonoscopy " (Fiberoptic)    DILATION AND CURETTAGE OF UTERUS  03/01/2013    Dilation And Curettage    HYSTEROSCOPY      LAPAROSCOPIC HYSTERECTOMY N/A 05/24/2024    tlh, bso, usls, posterior repair/perineorrhaphy    OTHER SURGICAL HISTORY  03/01/2013    Cervix Cryosurgery    OTHER SURGICAL HISTORY  10/12/2021    Ulnar collateral ligament of elbow reconstruction    OTHER SURGICAL HISTORY  10/12/2021    Metacarpophalangeal joint arthroplasty    OTHER SURGICAL HISTORY      urthethra carbuncle excision    ROTATOR CUFF REPAIR Right     SINUS SURGERY  03/01/2013    Sinus Surgery    SYNOVECTOMY      WRIST SURGERY Right 10/12/2021    Wrist arthroscopy/CTR/Ulnar collateral ligament of thumb rupture repair

## 2025-07-14 ENCOUNTER — PREP FOR PROCEDURE (OUTPATIENT)
Dept: OBSTETRICS AND GYNECOLOGY | Facility: CLINIC | Age: 70
End: 2025-07-14

## 2025-07-14 ENCOUNTER — OFFICE VISIT (OUTPATIENT)
Dept: OBSTETRICS AND GYNECOLOGY | Facility: CLINIC | Age: 70
End: 2025-07-14
Payer: MEDICARE

## 2025-07-14 ENCOUNTER — PHARMACY VISIT (OUTPATIENT)
Dept: PHARMACY | Facility: CLINIC | Age: 70
End: 2025-07-14
Payer: COMMERCIAL

## 2025-07-14 VITALS
BODY MASS INDEX: 26.85 KG/M2 | SYSTOLIC BLOOD PRESSURE: 116 MMHG | HEART RATE: 72 BPM | HEIGHT: 61 IN | WEIGHT: 142.2 LBS | DIASTOLIC BLOOD PRESSURE: 73 MMHG

## 2025-07-14 DIAGNOSIS — N81.11 MIDLINE CYSTOCELE: Primary | ICD-10-CM

## 2025-07-14 DIAGNOSIS — Z01.818 PRE-OP EVALUATION: ICD-10-CM

## 2025-07-14 DIAGNOSIS — N81.11 CYSTOCELE, MIDLINE: Primary | ICD-10-CM

## 2025-07-14 PROCEDURE — 99214 OFFICE O/P EST MOD 30 MIN: CPT | Performed by: OBSTETRICS & GYNECOLOGY

## 2025-07-14 PROCEDURE — 1159F MED LIST DOCD IN RCRD: CPT | Performed by: OBSTETRICS & GYNECOLOGY

## 2025-07-14 PROCEDURE — 3008F BODY MASS INDEX DOCD: CPT | Performed by: OBSTETRICS & GYNECOLOGY

## 2025-07-14 PROCEDURE — 1126F AMNT PAIN NOTED NONE PRSNT: CPT | Performed by: OBSTETRICS & GYNECOLOGY

## 2025-07-14 RX ORDER — ACETAMINOPHEN 325 MG/1
975 TABLET ORAL ONCE
OUTPATIENT
Start: 2025-07-14 | End: 2025-07-14

## 2025-07-14 RX ORDER — PHENAZOPYRIDINE HYDROCHLORIDE 200 MG/1
200 TABLET, FILM COATED ORAL ONCE
OUTPATIENT
Start: 2025-07-14 | End: 2025-07-14

## 2025-07-14 RX ORDER — GABAPENTIN 600 MG/1
600 TABLET ORAL ONCE
OUTPATIENT
Start: 2025-07-14 | End: 2025-07-14

## 2025-07-14 RX ORDER — CELECOXIB 400 MG/1
400 CAPSULE ORAL ONCE
OUTPATIENT
Start: 2025-07-14 | End: 2025-07-14

## 2025-07-14 ASSESSMENT — PAIN SCALES - GENERAL: PAINLEVEL_OUTOF10: 0-NO PAIN

## 2025-07-14 ASSESSMENT — ENCOUNTER SYMPTOMS
LOSS OF SENSATION IN FEET: 0
OCCASIONAL FEELINGS OF UNSTEADINESS: 0
DEPRESSION: 0

## 2025-08-14 DIAGNOSIS — Z12.31 VISIT FOR SCREENING MAMMOGRAM: ICD-10-CM

## 2025-08-14 DIAGNOSIS — Z78.0 MENOPAUSE: ICD-10-CM

## 2025-08-18 ENCOUNTER — TELEMEDICINE (OUTPATIENT)
Dept: OBSTETRICS AND GYNECOLOGY | Facility: CLINIC | Age: 70
End: 2025-08-18
Payer: MEDICARE

## 2025-08-18 ENCOUNTER — LAB (OUTPATIENT)
Dept: LAB | Facility: HOSPITAL | Age: 70
End: 2025-08-18
Payer: MEDICARE

## 2025-08-18 DIAGNOSIS — N81.11 MIDLINE CYSTOCELE: Primary | ICD-10-CM

## 2025-08-18 LAB
ANION GAP SERPL CALC-SCNC: 11 MMOL/L (ref 10–20)
BUN SERPL-MCNC: 20 MG/DL (ref 6–23)
CALCIUM SERPL-MCNC: 9 MG/DL (ref 8.6–10.6)
CHLORIDE SERPL-SCNC: 104 MMOL/L (ref 98–107)
CO2 SERPL-SCNC: 29 MMOL/L (ref 21–32)
CREAT SERPL-MCNC: 0.87 MG/DL (ref 0.5–1.05)
EGFRCR SERPLBLD CKD-EPI 2021: 72 ML/MIN/1.73M*2
ERYTHROCYTE [DISTWIDTH] IN BLOOD BY AUTOMATED COUNT: 12.9 % (ref 11.5–14.5)
GLUCOSE SERPL-MCNC: 96 MG/DL (ref 74–99)
HCT VFR BLD AUTO: 38.4 % (ref 36–46)
HGB BLD-MCNC: 13.2 G/DL (ref 12–16)
MCH RBC QN AUTO: 32.8 PG (ref 26–34)
MCHC RBC AUTO-ENTMCNC: 34.4 G/DL (ref 32–36)
MCV RBC AUTO: 95 FL (ref 80–100)
NRBC BLD-RTO: 0 /100 WBCS (ref 0–0)
PLATELET # BLD AUTO: 209 X10*3/UL (ref 150–450)
POTASSIUM SERPL-SCNC: 4.2 MMOL/L (ref 3.5–5.3)
RBC # BLD AUTO: 4.03 X10*6/UL (ref 4–5.2)
SODIUM SERPL-SCNC: 140 MMOL/L (ref 136–145)
WBC # BLD AUTO: 5.9 X10*3/UL (ref 4.4–11.3)

## 2025-08-18 PROCEDURE — 80048 BASIC METABOLIC PNL TOTAL CA: CPT

## 2025-08-18 PROCEDURE — 99212 OFFICE O/P EST SF 10 MIN: CPT | Performed by: OBSTETRICS & GYNECOLOGY

## 2025-08-18 PROCEDURE — 85027 COMPLETE CBC AUTOMATED: CPT

## 2025-08-19 ASSESSMENT — ENCOUNTER SYMPTOMS
RESPIRATORY NEGATIVE: 1
CARDIOVASCULAR NEGATIVE: 1
CONSTITUTIONAL NEGATIVE: 1
NEUROLOGICAL NEGATIVE: 1
GASTROINTESTINAL NEGATIVE: 1
PSYCHIATRIC NEGATIVE: 1
MUSCULOSKELETAL NEGATIVE: 1
ENDOCRINE NEGATIVE: 1
EYES NEGATIVE: 1

## 2025-08-20 ENCOUNTER — APPOINTMENT (OUTPATIENT)
Dept: PRIMARY CARE | Facility: CLINIC | Age: 70
End: 2025-08-20
Payer: MEDICARE

## 2025-08-20 ENCOUNTER — TELEPHONE (OUTPATIENT)
Dept: PRIMARY CARE | Facility: CLINIC | Age: 70
End: 2025-08-20

## 2025-08-20 ENCOUNTER — HOSPITAL ENCOUNTER (OUTPATIENT)
Dept: RADIOLOGY | Facility: CLINIC | Age: 70
Discharge: HOME | End: 2025-08-20
Payer: MEDICARE

## 2025-08-20 VITALS
SYSTOLIC BLOOD PRESSURE: 108 MMHG | DIASTOLIC BLOOD PRESSURE: 67 MMHG | BODY MASS INDEX: 27.48 KG/M2 | HEART RATE: 74 BPM | RESPIRATION RATE: 16 BRPM | WEIGHT: 140 LBS | HEIGHT: 60 IN

## 2025-08-20 DIAGNOSIS — Z51.81 ENCOUNTER FOR MONITORING STATIN THERAPY: ICD-10-CM

## 2025-08-20 DIAGNOSIS — R73.09 ELEVATED HEMOGLOBIN A1C: ICD-10-CM

## 2025-08-20 DIAGNOSIS — E55.9 VITAMIN D DEFICIENCY: ICD-10-CM

## 2025-08-20 DIAGNOSIS — R93.1 AGATSTON CORONARY ARTERY CALCIUM SCORE LESS THAN 100: ICD-10-CM

## 2025-08-20 DIAGNOSIS — Z78.9 FULL CODE STATUS: ICD-10-CM

## 2025-08-20 DIAGNOSIS — Z12.31 ENCOUNTER FOR SCREENING MAMMOGRAM FOR BREAST CANCER: ICD-10-CM

## 2025-08-20 DIAGNOSIS — Z78.0 MENOPAUSE: ICD-10-CM

## 2025-08-20 DIAGNOSIS — Z00.00 ENCOUNTER FOR ANNUAL PHYSICAL EXAM: ICD-10-CM

## 2025-08-20 DIAGNOSIS — Z79.899 ENCOUNTER FOR MONITORING STATIN THERAPY: ICD-10-CM

## 2025-08-20 DIAGNOSIS — R25.2 MUSCLE CRAMPS: ICD-10-CM

## 2025-08-20 DIAGNOSIS — E78.00 ELEVATED LDL CHOLESTEROL LEVEL: ICD-10-CM

## 2025-08-20 DIAGNOSIS — E53.8 LOW SERUM VITAMIN B12: ICD-10-CM

## 2025-08-20 DIAGNOSIS — Z00.00 ENCOUNTER FOR SUBSEQUENT ANNUAL WELLNESS VISIT (AWV) IN MEDICARE PATIENT: Primary | ICD-10-CM

## 2025-08-20 DIAGNOSIS — Z79.899 NEW MEDICATION ADDED: ICD-10-CM

## 2025-08-20 DIAGNOSIS — Z71.85 IMMUNIZATION COUNSELING: ICD-10-CM

## 2025-08-20 DIAGNOSIS — K21.9 GASTROESOPHAGEAL REFLUX DISEASE, UNSPECIFIED WHETHER ESOPHAGITIS PRESENT: ICD-10-CM

## 2025-08-20 PROBLEM — M79.7 FIBROMYALGIA: Status: RESOLVED | Noted: 2025-08-20 | Resolved: 2025-08-20

## 2025-08-20 PROBLEM — R20.2 PARESTHESIA: Status: RESOLVED | Noted: 2025-08-20 | Resolved: 2025-08-20

## 2025-08-20 PROBLEM — M15.9 GENERALIZED OSTEOARTHRITIS OF HAND: Status: ACTIVE | Noted: 2025-08-20

## 2025-08-20 LAB — CK SERPL-CCNC: 73 U/L (ref 20–243)

## 2025-08-20 PROCEDURE — 99213 OFFICE O/P EST LOW 20 MIN: CPT | Performed by: INTERNAL MEDICINE

## 2025-08-20 PROCEDURE — 99397 PER PM REEVAL EST PAT 65+ YR: CPT | Performed by: INTERNAL MEDICINE

## 2025-08-20 PROCEDURE — 1123F ACP DISCUSS/DSCN MKR DOCD: CPT | Performed by: INTERNAL MEDICINE

## 2025-08-20 PROCEDURE — 1126F AMNT PAIN NOTED NONE PRSNT: CPT | Performed by: INTERNAL MEDICINE

## 2025-08-20 PROCEDURE — 77080 DXA BONE DENSITY AXIAL: CPT

## 2025-08-20 PROCEDURE — 1160F RVW MEDS BY RX/DR IN RCRD: CPT | Performed by: INTERNAL MEDICINE

## 2025-08-20 PROCEDURE — 3008F BODY MASS INDEX DOCD: CPT | Performed by: INTERNAL MEDICINE

## 2025-08-20 PROCEDURE — 1159F MED LIST DOCD IN RCRD: CPT | Performed by: INTERNAL MEDICINE

## 2025-08-20 PROCEDURE — 1170F FXNL STATUS ASSESSED: CPT | Performed by: INTERNAL MEDICINE

## 2025-08-20 PROCEDURE — 1158F ADVNC CARE PLAN TLK DOCD: CPT | Performed by: INTERNAL MEDICINE

## 2025-08-20 PROCEDURE — G0439 PPPS, SUBSEQ VISIT: HCPCS | Performed by: INTERNAL MEDICINE

## 2025-08-20 RX ORDER — PRAVASTATIN SODIUM 40 MG/1
40 TABLET ORAL DAILY
Qty: 90 TABLET | Refills: 3 | Status: SHIPPED | OUTPATIENT
Start: 2025-08-20 | End: 2026-08-20

## 2025-08-20 SDOH — ECONOMIC STABILITY: INCOME INSECURITY: IN THE LAST 12 MONTHS, WAS THERE A TIME WHEN YOU WERE NOT ABLE TO PAY THE MORTGAGE OR RENT ON TIME?: NO

## 2025-08-20 SDOH — ECONOMIC STABILITY: FOOD INSECURITY: WITHIN THE PAST 12 MONTHS, THE FOOD YOU BOUGHT JUST DIDN'T LAST AND YOU DIDN'T HAVE MONEY TO GET MORE.: NEVER TRUE

## 2025-08-20 SDOH — HEALTH STABILITY: PHYSICAL HEALTH: ON AVERAGE, HOW MANY DAYS PER WEEK DO YOU ENGAGE IN MODERATE TO STRENUOUS EXERCISE (LIKE A BRISK WALK)?: 5 DAYS

## 2025-08-20 SDOH — ECONOMIC STABILITY: FOOD INSECURITY: WITHIN THE PAST 12 MONTHS, YOU WORRIED THAT YOUR FOOD WOULD RUN OUT BEFORE YOU GOT MONEY TO BUY MORE.: NEVER TRUE

## 2025-08-20 SDOH — HEALTH STABILITY: PHYSICAL HEALTH: ON AVERAGE, HOW MANY MINUTES DO YOU ENGAGE IN EXERCISE AT THIS LEVEL?: 120 MIN

## 2025-08-20 SDOH — ECONOMIC STABILITY: GENERAL
WHICH OF THE FOLLOWING DO YOU KNOW HOW TO USE AND HAVE ACCESS TO EVERY DAY? (CHOOSE ALL THAT APPLY): DESKTOP COMPUTER, LAPTOP COMPUTER, OR TABLET WITH BROADBAND INTERNET CONNECTION;SMARTPHONE WITH CELLULAR DATA PLAN

## 2025-08-20 ASSESSMENT — ACTIVITIES OF DAILY LIVING (ADL)
BATHING: INDEPENDENT
WALKS IN HOME: INDEPENDENT
STIL DRIVING: YES
GROOMING: INDEPENDENT
USING TRANSPORTATION: INDEPENDENT
PREPARING MEALS: INDEPENDENT
FEEDING YOURSELF: INDEPENDENT
HEARING - RIGHT EAR: FUNCTIONAL
NEEDS ASSISTANCE WITH FOOD: INDEPENDENT
EATING: INDEPENDENT
ADEQUATE_TO_COMPLETE_ADL: YES
DOING HOUSEWORK: INDEPENDENT
GROCERY SHOPPING: INDEPENDENT
TAKING MEDICATION: INDEPENDENT
DRESSING YOURSELF: INDEPENDENT
HEARING - LEFT EAR: FUNCTIONAL
MANAGING FINANCES: INDEPENDENT
PILL BOX USED: NO
PATIENT'S MEMORY ADEQUATE TO SAFELY COMPLETE DAILY ACTIVITIES?: YES
USING TELEPHONE: INDEPENDENT
TOILETING: INDEPENDENT
JUDGMENT_ADEQUATE_SAFELY_COMPLETE_DAILY_ACTIVITIES: YES

## 2025-08-20 ASSESSMENT — SOCIAL DETERMINANTS OF HEALTH (SDOH)
WITHIN THE LAST YEAR, HAVE TO BEEN RAPED OR FORCED TO HAVE ANY KIND OF SEXUAL ACTIVITY BY YOUR PARTNER OR EX-PARTNER?: NO
IN THE PAST 12 MONTHS, HAS THE ELECTRIC, GAS, OIL, OR WATER COMPANY THREATENED TO SHUT OFF SERVICE IN YOUR HOME?: NO
DO YOU BELONG TO ANY CLUBS OR ORGANIZATIONS SUCH AS CHURCH GROUPS UNIONS, FRATERNAL OR ATHLETIC GROUPS, OR SCHOOL GROUPS?: YES
HOW HARD IS IT FOR YOU TO PAY FOR THE VERY BASICS LIKE FOOD, HOUSING, MEDICAL CARE, AND HEATING?: NOT HARD AT ALL
WITHIN THE LAST YEAR, HAVE YOU BEEN AFRAID OF YOUR PARTNER OR EX-PARTNER?: NO
WITHIN THE LAST YEAR, HAVE YOU BEEN KICKED, HIT, SLAPPED, OR OTHERWISE PHYSICALLY HURT BY YOUR PARTNER OR EX-PARTNER?: NO
HOW OFTEN DO YOU ATTENT MEETINGS OF THE CLUB OR ORGANIZATION YOU BELONG TO?: MORE THAN 4 TIMES PER YEAR
HOW OFTEN DO YOU GET TOGETHER WITH FRIENDS OR RELATIVES?: MORE THAN THREE TIMES A WEEK
WITHIN THE LAST YEAR, HAVE YOU BEEN HUMILIATED OR EMOTIONALLY ABUSED IN OTHER WAYS BY YOUR PARTNER OR EX-PARTNER?: NO
IN A TYPICAL WEEK, HOW MANY TIMES DO YOU TALK ON THE PHONE WITH FAMILY, FRIENDS, OR NEIGHBORS?: MORE THAN THREE TIMES A WEEK
HOW OFTEN DO YOU ATTEND CHURCH OR RELIGIOUS SERVICES?: MORE THAN 4 TIMES PER YEAR

## 2025-08-20 ASSESSMENT — LIFESTYLE VARIABLES
AUDIT-C TOTAL SCORE: 3
HOW OFTEN DO YOU HAVE SIX OR MORE DRINKS ON ONE OCCASION: NEVER
HOW OFTEN DO YOU HAVE A DRINK CONTAINING ALCOHOL: 2-4 TIMES A MONTH
SKIP TO QUESTIONS 9-10: 0
HOW MANY STANDARD DRINKS CONTAINING ALCOHOL DO YOU HAVE ON A TYPICAL DAY: 3 OR 4

## 2025-08-20 ASSESSMENT — ANXIETY QUESTIONNAIRES
3. WORRYING TOO MUCH ABOUT DIFFERENT THINGS: NOT AT ALL
1. FEELING NERVOUS, ANXIOUS, OR ON EDGE: NOT AT ALL
2. NOT BEING ABLE TO STOP OR CONTROL WORRYING: NOT AT ALL
GAD7 TOTAL SCORE: 0
6. BECOMING EASILY ANNOYED OR IRRITABLE: NOT AT ALL
IF YOU CHECKED OFF ANY PROBLEMS ON THIS QUESTIONNAIRE, HOW DIFFICULT HAVE THESE PROBLEMS MADE IT FOR YOU TO DO YOUR WORK, TAKE CARE OF THINGS AT HOME, OR GET ALONG WITH OTHER PEOPLE: NOT DIFFICULT AT ALL
7. FEELING AFRAID AS IF SOMETHING AWFUL MIGHT HAPPEN: NOT AT ALL
5. BEING SO RESTLESS THAT IT IS HARD TO SIT STILL: NOT AT ALL
4. TROUBLE RELAXING: NOT AT ALL

## 2025-08-20 ASSESSMENT — PATIENT HEALTH QUESTIONNAIRE - PHQ9
2. FEELING DOWN, DEPRESSED OR HOPELESS: NOT AT ALL
2. FEELING DOWN, DEPRESSED OR HOPELESS: NOT AT ALL
SUM OF ALL RESPONSES TO PHQ9 QUESTIONS 1 AND 2: 0
SUM OF ALL RESPONSES TO PHQ9 QUESTIONS 1 AND 2: 0
1. LITTLE INTEREST OR PLEASURE IN DOING THINGS: NOT AT ALL
1. LITTLE INTEREST OR PLEASURE IN DOING THINGS: NOT AT ALL

## 2025-08-20 ASSESSMENT — PAIN SCALES - GENERAL: PAINLEVEL_OUTOF10: 0-NO PAIN

## 2025-08-30 LAB — UBIQUINONE10 SERPL-MCNC: 0.93 UG/ML

## 2025-09-17 ENCOUNTER — APPOINTMENT (OUTPATIENT)
Dept: RADIOLOGY | Facility: CLINIC | Age: 70
End: 2025-09-17
Payer: MEDICARE

## 2025-11-06 ENCOUNTER — APPOINTMENT (OUTPATIENT)
Dept: DERMATOLOGY | Facility: CLINIC | Age: 70
End: 2025-11-06
Payer: MEDICARE

## 2025-11-17 ENCOUNTER — APPOINTMENT (OUTPATIENT)
Dept: PRIMARY CARE | Facility: CLINIC | Age: 70
End: 2025-11-17
Payer: MEDICARE

## 2025-12-17 ENCOUNTER — APPOINTMENT (OUTPATIENT)
Dept: DERMATOLOGY | Facility: CLINIC | Age: 70
End: 2025-12-17
Payer: MEDICARE

## (undated) DEVICE — Device

## (undated) DEVICE — SUTURE, VICRYL, 0, SH 27 TAPER NEEDLE, UNDYED, BRAIDED

## (undated) DEVICE — COUNTER, NEEDLE, FOAM BLOCK, POP-N-COUNT, W/BLADEGUARD, W/ADHESIVE 40 COUNT, RED

## (undated) DEVICE — DRAPE, INSTRUMENT, W/POUCH, STERI DRAPE, 9 5/8 X 18 LONG

## (undated) DEVICE — SUTURE, V-LOC, 0, 12IN, GS-21, GR 180 ABS

## (undated) DEVICE — SUTURE, VICRYL, 3-0, 27 IN, SH

## (undated) DEVICE — DRAPE PACK, LAVH, W/ATTACHED LEGGINGS, W/POUCH, 100 X 114 IN, LF, STERILE

## (undated) DEVICE — SUTURE, VICRYL, 4-0, 18 IN, PS2, UNDYED

## (undated) DEVICE — BRIEF, CURITY, XLARGE, MESH

## (undated) DEVICE — ELECTRODE, LAPAROSCOPY, L HOOK, 33CM, STERILE

## (undated) DEVICE — ADHESIVE, SKIN, LIQUIBAND EXCEED

## (undated) DEVICE — COVER HANDLE LIGHT, STERIS, BLUE, STERILE

## (undated) DEVICE — PAD, GROUNDING, ELECTROSURGICAL, W/9 FT CABLE, POLYHESIVE II, ADULT, LF

## (undated) DEVICE — DRAPE, SHEET, THREE QUARTER, FAN FOLD, 57 X 77 IN

## (undated) DEVICE — CARE KIT, LAPAROSCOPIC, ADVANCED

## (undated) DEVICE — LIGASURE, SEALER/DIVIDER MARYLAND JAW, 5MM

## (undated) DEVICE — RETRACTOR, SURGICAL, RING, PLASTIC, DISPOSABLE

## (undated) DEVICE — SOLUTION, IRRIGATION, SODIUM CHLORIDE 0.9%, 1000 ML, POUR BOTTLE

## (undated) DEVICE — POSITIONING KIT, PAGAZZI, PINK PAD XL, W/ ARM AND HEAD REST

## (undated) DEVICE — TUBING, SUCTION, NON-CONDUCTIVE, W/CONNECT,.25 IN X 12 FT, STERILE, LF

## (undated) DEVICE — SUTURE, VICRYL, 0, 54 IN, CTD, UNDYED

## (undated) DEVICE — PUMP, STRYKERFLOW 2 & HANDPIECE W/10FT. IRRIGATION TUBING

## (undated) DEVICE — TROCAR, OPTICAL BLADELESS 5MM X 100 W/ADVANCED FIXATION

## (undated) DEVICE — SOLUTION, IRRIGATION, USP, SODIUM CHLORIDE 0.9%, 3000 ML

## (undated) DEVICE — TUBE SET, PNEUMOLAR HEATED, SMOKE EVACU, HIGH-FLOW

## (undated) DEVICE — TROCAR, OPTICAL, BLADELESS, 12MM, THREADED, 100MM LENGTH

## (undated) DEVICE — DRAPE, UNDERBUTTOCKS, W / 27IN FLUID POUCH

## (undated) DEVICE — IRRIGATION SET, CYSTOSCOPY, REGULATING CLAMP, STRAIGHT, 81 IN

## (undated) DEVICE — DRESSING, NON-ADHERENT, TELFA, OUCHLESS, 3 X 8 IN, STERILE

## (undated) DEVICE — SPONGE, LAP, XRAY DECT, 4IN X 18IN, W/MASTER DMT, STERILE

## (undated) DEVICE — TROCAR SYSTEM, BALLOON, KII GELPORT, 12 X 100MM

## (undated) DEVICE — SUTURE, VICRYL, 0, 27 IN, UR-6, VIOLET

## (undated) DEVICE — PREP TRAY, VAGINAL

## (undated) DEVICE — STAY SET, SURGICAL, 5MM SHARP HOOK, 8PK

## (undated) DEVICE — RETRACTOR, CERVICAL CUP, VCARE, STANDARD

## (undated) DEVICE — SOLUTION, IRRIGATION, USP, SODIUM CHLORIDE 0.9%, .9 NACL, 1000 ML, BAG

## (undated) DEVICE — GLOVE, SURGICAL, PROTEXIS PI W/NEU-THERA, 7.0, PF, LF

## (undated) DEVICE — TRAY, FOLEY, LUBRI-SIL, 16FR, COMPLETE CARE W/STATLOCK

## (undated) DEVICE — DRAPE, LEGGINGS, 28.5 X 43 IN, DISPOSABLE, LF, STERILE

## (undated) DEVICE — SLEEVE, KII, Z-THREAD, 5X100CM

## (undated) DEVICE — IRRIGATION SET, CYSTOSCOPY, TURP, Y, CONTINUOUS, 81 IN

## (undated) DEVICE — PAD, SANITARY, OBSTETRICAL, W/ADHSV STRIP,11 IN,LF

## (undated) DEVICE — SOLUTION, SCRUB EXIDINE, 4% CHG, 4 OZ

## (undated) DEVICE — GLOVE, SURGICAL, PROTEXIS PI MICRO, 6.5, PF, LF

## (undated) DEVICE — SYRINGE, 20 CC, LUER LOCK, MONOJECT, W/O CAP, LF

## (undated) DEVICE — STAPLER, SKIN PROXIMATE, 35 WIDE

## (undated) DEVICE — SUTURE, MONOCRYL, 3-0, 27 IN, SH, UNDYED